# Patient Record
Sex: FEMALE | Race: OTHER | HISPANIC OR LATINO | URBAN - METROPOLITAN AREA
[De-identification: names, ages, dates, MRNs, and addresses within clinical notes are randomized per-mention and may not be internally consistent; named-entity substitution may affect disease eponyms.]

---

## 2017-11-27 ENCOUNTER — EMERGENCY (EMERGENCY)
Facility: HOSPITAL | Age: 27
LOS: 1 days | Discharge: HOME | End: 2017-11-27
Admitting: EMERGENCY MEDICINE
Payer: COMMERCIAL

## 2017-11-27 VITALS
RESPIRATION RATE: 16 BRPM | SYSTOLIC BLOOD PRESSURE: 133 MMHG | TEMPERATURE: 99 F | OXYGEN SATURATION: 100 % | HEART RATE: 94 BPM | DIASTOLIC BLOOD PRESSURE: 79 MMHG

## 2017-11-27 LAB
ALBUMIN SERPL ELPH-MCNC: 3.9 G/DL — SIGNIFICANT CHANGE UP (ref 3.3–5)
ALP SERPL-CCNC: 73 U/L — SIGNIFICANT CHANGE UP (ref 40–120)
ALT FLD-CCNC: 41 U/L — HIGH (ref 4–33)
AMYLASE P1 CFR SERPL: 31 U/L — SIGNIFICANT CHANGE UP (ref 25–125)
APPEARANCE UR: SIGNIFICANT CHANGE UP
AST SERPL-CCNC: 22 U/L — SIGNIFICANT CHANGE UP (ref 4–32)
BASOPHILS # BLD AUTO: 0.01 K/UL — SIGNIFICANT CHANGE UP (ref 0–0.2)
BASOPHILS NFR BLD AUTO: 0.1 % — SIGNIFICANT CHANGE UP (ref 0–2)
BILIRUB SERPL-MCNC: 0.2 MG/DL — SIGNIFICANT CHANGE UP (ref 0.2–1.2)
BILIRUB UR-MCNC: NEGATIVE — SIGNIFICANT CHANGE UP
BLOOD UR QL VISUAL: NEGATIVE — SIGNIFICANT CHANGE UP
BUN SERPL-MCNC: 11 MG/DL — SIGNIFICANT CHANGE UP (ref 7–23)
CALCIUM SERPL-MCNC: 8.5 MG/DL — SIGNIFICANT CHANGE UP (ref 8.4–10.5)
CHLORIDE SERPL-SCNC: 104 MMOL/L — SIGNIFICANT CHANGE UP (ref 98–107)
CO2 SERPL-SCNC: 29 MMOL/L — SIGNIFICANT CHANGE UP (ref 22–31)
COLOR SPEC: YELLOW — SIGNIFICANT CHANGE UP
CREAT SERPL-MCNC: 0.61 MG/DL — SIGNIFICANT CHANGE UP (ref 0.5–1.3)
EOSINOPHIL # BLD AUTO: 0.06 K/UL — SIGNIFICANT CHANGE UP (ref 0–0.5)
EOSINOPHIL NFR BLD AUTO: 0.9 % — SIGNIFICANT CHANGE UP (ref 0–6)
GLUCOSE SERPL-MCNC: 96 MG/DL — SIGNIFICANT CHANGE UP (ref 70–99)
GLUCOSE UR-MCNC: NEGATIVE — SIGNIFICANT CHANGE UP
HCT VFR BLD CALC: 34.8 % — SIGNIFICANT CHANGE UP (ref 34.5–45)
HGB BLD-MCNC: 11.6 G/DL — SIGNIFICANT CHANGE UP (ref 11.5–15.5)
IMM GRANULOCYTES # BLD AUTO: 0.02 # — SIGNIFICANT CHANGE UP
IMM GRANULOCYTES NFR BLD AUTO: 0.3 % — SIGNIFICANT CHANGE UP (ref 0–1.5)
KETONES UR-MCNC: NEGATIVE — SIGNIFICANT CHANGE UP
LEUKOCYTE ESTERASE UR-ACNC: HIGH
LIDOCAIN IGE QN: 37.9 U/L — SIGNIFICANT CHANGE UP (ref 7–60)
LYMPHOCYTES # BLD AUTO: 2.28 K/UL — SIGNIFICANT CHANGE UP (ref 1–3.3)
LYMPHOCYTES # BLD AUTO: 33.7 % — SIGNIFICANT CHANGE UP (ref 13–44)
MCHC RBC-ENTMCNC: 28 PG — SIGNIFICANT CHANGE UP (ref 27–34)
MCHC RBC-ENTMCNC: 33.3 % — SIGNIFICANT CHANGE UP (ref 32–36)
MCV RBC AUTO: 83.9 FL — SIGNIFICANT CHANGE UP (ref 80–100)
MONOCYTES # BLD AUTO: 0.44 K/UL — SIGNIFICANT CHANGE UP (ref 0–0.9)
MONOCYTES NFR BLD AUTO: 6.5 % — SIGNIFICANT CHANGE UP (ref 2–14)
MUCOUS THREADS # UR AUTO: SIGNIFICANT CHANGE UP
NEUTROPHILS # BLD AUTO: 3.96 K/UL — SIGNIFICANT CHANGE UP (ref 1.8–7.4)
NEUTROPHILS NFR BLD AUTO: 58.5 % — SIGNIFICANT CHANGE UP (ref 43–77)
NITRITE UR-MCNC: NEGATIVE — SIGNIFICANT CHANGE UP
NRBC # FLD: 0 — SIGNIFICANT CHANGE UP
PH UR: 6.5 — SIGNIFICANT CHANGE UP (ref 4.6–8)
PLATELET # BLD AUTO: 180 K/UL — SIGNIFICANT CHANGE UP (ref 150–400)
PMV BLD: 9.8 FL — SIGNIFICANT CHANGE UP (ref 7–13)
POTASSIUM SERPL-MCNC: 4.1 MMOL/L — SIGNIFICANT CHANGE UP (ref 3.5–5.3)
POTASSIUM SERPL-SCNC: 4.1 MMOL/L — SIGNIFICANT CHANGE UP (ref 3.5–5.3)
PROT SERPL-MCNC: 7.4 G/DL — SIGNIFICANT CHANGE UP (ref 6–8.3)
PROT UR-MCNC: 20 — SIGNIFICANT CHANGE UP
RBC # BLD: 4.15 M/UL — SIGNIFICANT CHANGE UP (ref 3.8–5.2)
RBC # FLD: 13.2 % — SIGNIFICANT CHANGE UP (ref 10.3–14.5)
RBC CASTS # UR COMP ASSIST: SIGNIFICANT CHANGE UP (ref 0–?)
SODIUM SERPL-SCNC: 142 MMOL/L — SIGNIFICANT CHANGE UP (ref 135–145)
SP GR SPEC: 1.03 — SIGNIFICANT CHANGE UP (ref 1–1.03)
SQUAMOUS # UR AUTO: SIGNIFICANT CHANGE UP
UROBILINOGEN FLD QL: NORMAL E.U. — SIGNIFICANT CHANGE UP (ref 0.1–0.2)
WBC # BLD: 6.77 K/UL — SIGNIFICANT CHANGE UP (ref 3.8–10.5)
WBC # FLD AUTO: 6.77 K/UL — SIGNIFICANT CHANGE UP (ref 3.8–10.5)
WBC UR QL: SIGNIFICANT CHANGE UP (ref 0–?)

## 2017-11-27 PROCEDURE — 76830 TRANSVAGINAL US NON-OB: CPT | Mod: 26

## 2017-11-27 PROCEDURE — 99284 EMERGENCY DEPT VISIT MOD MDM: CPT

## 2017-11-27 RX ORDER — ACETAMINOPHEN 500 MG
650 TABLET ORAL ONCE
Qty: 0 | Refills: 0 | Status: COMPLETED | OUTPATIENT
Start: 2017-11-27 | End: 2017-11-27

## 2017-11-27 RX ORDER — KETOROLAC TROMETHAMINE 30 MG/ML
30 SYRINGE (ML) INJECTION ONCE
Qty: 0 | Refills: 0 | Status: DISCONTINUED | OUTPATIENT
Start: 2017-11-27 | End: 2017-11-27

## 2017-11-27 RX ADMIN — Medication 650 MILLIGRAM(S): at 23:56

## 2017-11-27 RX ADMIN — Medication 30 MILLIGRAM(S): at 23:56

## 2017-11-27 NOTE — ED ADULT NURSE NOTE - OBJECTIVE STATEMENT
pt AO x3, ambulatory, c/o LLQ Abd Pain over area of her  scar with nausea. Pain has been there x2 weeks, however worse in the last few days. Denies chest pain, dizziness, SOB and actual vomiting at this time. Evaluated by provider. Blood and urine obtained and sent to LAB. IV inserted. Right arm 20G. Will continue to monitor.

## 2017-11-27 NOTE — ED PROVIDER NOTE - OBJECTIVE STATEMENT
28 y/o female c/o llq pain that started on 11/10/2017 when she got her period and she attributed the pain to her period. The pain has remained and gotten worse over the past 2 weeks and is associated with nausea but no vomiting. Denies trauma, fever, dysuria, hematuria, vag d/c.

## 2017-11-27 NOTE — ED PROVIDER NOTE - CARE PLAN
Principal Discharge DX:	Laceration of finger Principal Discharge DX:	Laceration of finger  Instructions for follow-up, activity and diet:	Follow up with OBGYN. Rest, drink plenty of fluids.  Advance activity as tolerated.  Continue all previously prescribed medications as directed. You can use motrin 600mg every 6-8 hours for pain or fever, and/or Tylenol 650 mg every 4 hours for pain/fever. Follow up with your primary care physician in 48-72 hours- bring copies of your results.  Return to the emergency department for chest pain, shortness of breath, dizziness, or worsening, concerning, or persistent symptoms. Principal Discharge DX:	Abdominal pain  Instructions for follow-up, activity and diet:	Follow up with OBGYN. Rest, drink plenty of fluids.  Advance activity as tolerated.  Continue all previously prescribed medications as directed. You can use motrin 600mg every 6-8 hours for pain or fever, and/or Tylenol 650 mg every 4 hours for pain/fever. Follow up with your primary care physician in 48-72 hours- bring copies of your results.  Return to the emergency department for chest pain, shortness of breath, dizziness, or worsening, concerning, or persistent symptoms.

## 2017-11-27 NOTE — ED PROVIDER NOTE - PLAN OF CARE
Follow up with OBGYN. Rest, drink plenty of fluids.  Advance activity as tolerated.  Continue all previously prescribed medications as directed. You can use motrin 600mg every 6-8 hours for pain or fever, and/or Tylenol 650 mg every 4 hours for pain/fever. Follow up with your primary care physician in 48-72 hours- bring copies of your results.  Return to the emergency department for chest pain, shortness of breath, dizziness, or worsening, concerning, or persistent symptoms.

## 2017-11-27 NOTE — ED ADULT TRIAGE NOTE - CHIEF COMPLAINT QUOTE
pt c/o LLQ Abd Pain over area of her  scar. pain has been there x2 weeks, however worse in the last few days. pt states "it feels like there is knot there". pt endorsing some nausea, no vomiting. LM- 11/10/17. pt appears comfortable, in NAD.

## 2017-11-27 NOTE — ED PROVIDER NOTE - PROGRESS NOTE DETAILS
CHANTEL Dash: TVUS with simple cyst will dc with nsaids and gyn follow up. pt improved with toradol here.

## 2017-12-23 ENCOUNTER — EMERGENCY (EMERGENCY)
Facility: HOSPITAL | Age: 27
LOS: 1 days | Discharge: HOME | End: 2017-12-23
Attending: EMERGENCY MEDICINE | Admitting: EMERGENCY MEDICINE
Payer: COMMERCIAL

## 2017-12-23 VITALS
SYSTOLIC BLOOD PRESSURE: 128 MMHG | TEMPERATURE: 99 F | RESPIRATION RATE: 18 BRPM | OXYGEN SATURATION: 100 % | DIASTOLIC BLOOD PRESSURE: 72 MMHG | HEART RATE: 78 BPM

## 2017-12-23 VITALS
SYSTOLIC BLOOD PRESSURE: 102 MMHG | TEMPERATURE: 99 F | HEART RATE: 74 BPM | OXYGEN SATURATION: 100 % | RESPIRATION RATE: 16 BRPM | DIASTOLIC BLOOD PRESSURE: 56 MMHG

## 2017-12-23 LAB
ALBUMIN SERPL ELPH-MCNC: 4 G/DL — SIGNIFICANT CHANGE UP (ref 3.3–5)
ALP SERPL-CCNC: 67 U/L — SIGNIFICANT CHANGE UP (ref 40–120)
ALT FLD-CCNC: 76 U/L — HIGH (ref 4–33)
APTT BLD: 28.3 SEC — SIGNIFICANT CHANGE UP (ref 27.5–37.4)
AST SERPL-CCNC: 49 U/L — HIGH (ref 4–32)
BASOPHILS # BLD AUTO: 0.03 K/UL — SIGNIFICANT CHANGE UP (ref 0–0.2)
BASOPHILS NFR BLD AUTO: 0.4 % — SIGNIFICANT CHANGE UP (ref 0–2)
BILIRUB SERPL-MCNC: < 0.2 MG/DL — LOW (ref 0.2–1.2)
BUN SERPL-MCNC: 15 MG/DL — SIGNIFICANT CHANGE UP (ref 7–23)
CALCIUM SERPL-MCNC: 8.9 MG/DL — SIGNIFICANT CHANGE UP (ref 8.4–10.5)
CHLORIDE SERPL-SCNC: 105 MMOL/L — SIGNIFICANT CHANGE UP (ref 98–107)
CO2 SERPL-SCNC: 24 MMOL/L — SIGNIFICANT CHANGE UP (ref 22–31)
CREAT SERPL-MCNC: 0.76 MG/DL — SIGNIFICANT CHANGE UP (ref 0.5–1.3)
EOSINOPHIL # BLD AUTO: 0.04 K/UL — SIGNIFICANT CHANGE UP (ref 0–0.5)
EOSINOPHIL NFR BLD AUTO: 0.5 % — SIGNIFICANT CHANGE UP (ref 0–6)
GLUCOSE SERPL-MCNC: 87 MG/DL — SIGNIFICANT CHANGE UP (ref 70–99)
HCG SERPL-ACNC: < 5 MIU/ML — SIGNIFICANT CHANGE UP
HCT VFR BLD CALC: 39.6 % — SIGNIFICANT CHANGE UP (ref 34.5–45)
HGB BLD-MCNC: 12.8 G/DL — SIGNIFICANT CHANGE UP (ref 11.5–15.5)
IMM GRANULOCYTES # BLD AUTO: 0.04 # — SIGNIFICANT CHANGE UP
IMM GRANULOCYTES NFR BLD AUTO: 0.5 % — SIGNIFICANT CHANGE UP (ref 0–1.5)
INR BLD: 1.02 — SIGNIFICANT CHANGE UP (ref 0.88–1.17)
LYMPHOCYTES # BLD AUTO: 2.92 K/UL — SIGNIFICANT CHANGE UP (ref 1–3.3)
LYMPHOCYTES # BLD AUTO: 37.9 % — SIGNIFICANT CHANGE UP (ref 13–44)
MCHC RBC-ENTMCNC: 27.8 PG — SIGNIFICANT CHANGE UP (ref 27–34)
MCHC RBC-ENTMCNC: 32.3 % — SIGNIFICANT CHANGE UP (ref 32–36)
MCV RBC AUTO: 85.9 FL — SIGNIFICANT CHANGE UP (ref 80–100)
MONOCYTES # BLD AUTO: 0.58 K/UL — SIGNIFICANT CHANGE UP (ref 0–0.9)
MONOCYTES NFR BLD AUTO: 7.5 % — SIGNIFICANT CHANGE UP (ref 2–14)
NEUTROPHILS # BLD AUTO: 4.1 K/UL — SIGNIFICANT CHANGE UP (ref 1.8–7.4)
NEUTROPHILS NFR BLD AUTO: 53.2 % — SIGNIFICANT CHANGE UP (ref 43–77)
NRBC # FLD: 0 — SIGNIFICANT CHANGE UP
PLATELET # BLD AUTO: 211 K/UL — SIGNIFICANT CHANGE UP (ref 150–400)
PMV BLD: 9.6 FL — SIGNIFICANT CHANGE UP (ref 7–13)
POTASSIUM SERPL-MCNC: 4.5 MMOL/L — SIGNIFICANT CHANGE UP (ref 3.5–5.3)
POTASSIUM SERPL-SCNC: 4.5 MMOL/L — SIGNIFICANT CHANGE UP (ref 3.5–5.3)
PROT SERPL-MCNC: 7.5 G/DL — SIGNIFICANT CHANGE UP (ref 6–8.3)
PROTHROM AB SERPL-ACNC: 11.7 SEC — SIGNIFICANT CHANGE UP (ref 9.8–13.1)
RBC # BLD: 4.61 M/UL — SIGNIFICANT CHANGE UP (ref 3.8–5.2)
RBC # FLD: 13.2 % — SIGNIFICANT CHANGE UP (ref 10.3–14.5)
SODIUM SERPL-SCNC: 142 MMOL/L — SIGNIFICANT CHANGE UP (ref 135–145)
WBC # BLD: 7.71 K/UL — SIGNIFICANT CHANGE UP (ref 3.8–10.5)
WBC # FLD AUTO: 7.71 K/UL — SIGNIFICANT CHANGE UP (ref 3.8–10.5)

## 2017-12-23 PROCEDURE — 99284 EMERGENCY DEPT VISIT MOD MDM: CPT

## 2017-12-23 PROCEDURE — 70450 CT HEAD/BRAIN W/O DYE: CPT | Mod: 26

## 2017-12-23 RX ORDER — METOCLOPRAMIDE HCL 10 MG
10 TABLET ORAL ONCE
Qty: 0 | Refills: 0 | Status: COMPLETED | OUTPATIENT
Start: 2017-12-23 | End: 2017-12-23

## 2017-12-23 RX ORDER — SODIUM CHLORIDE 9 MG/ML
1000 INJECTION INTRAMUSCULAR; INTRAVENOUS; SUBCUTANEOUS ONCE
Qty: 0 | Refills: 0 | Status: COMPLETED | OUTPATIENT
Start: 2017-12-23 | End: 2017-12-23

## 2017-12-23 RX ORDER — DIPHENHYDRAMINE HCL 50 MG
25 CAPSULE ORAL ONCE
Qty: 0 | Refills: 0 | Status: COMPLETED | OUTPATIENT
Start: 2017-12-23 | End: 2017-12-23

## 2017-12-23 RX ADMIN — Medication 25 MILLIGRAM(S): at 19:42

## 2017-12-23 RX ADMIN — SODIUM CHLORIDE 1000 MILLILITER(S): 9 INJECTION INTRAMUSCULAR; INTRAVENOUS; SUBCUTANEOUS at 19:41

## 2017-12-23 RX ADMIN — Medication 10 MILLIGRAM(S): at 19:42

## 2017-12-23 NOTE — ED PROVIDER NOTE - PLAN OF CARE
Take acetaminophen (Tylenol) 975mg (3 regular strength tablets) up to 4 times per day as needed for pain. Never take more than 4000mg of acetaminophen/Tylenol in any 24 hour period. Follow up with any of the provided neurology referrals for further evaluation as needed if your headache persists for >5 days in total. Return here to the emergency department for severe pain, intractable vomiting, any signs of a stroke as we discussed, or any other new acute symptoms of concern.

## 2017-12-23 NOTE — ED PROVIDER NOTE - CARE PLAN
Principal Discharge DX:	Headache Principal Discharge DX:	Headache  Instructions for follow-up, activity and diet:	Take acetaminophen (Tylenol) 975mg (3 regular strength tablets) up to 4 times per day as needed for pain. Never take more than 4000mg of acetaminophen/Tylenol in any 24 hour period. Follow up with any of the provided neurology referrals for further evaluation as needed if your headache persists for >5 days in total. Return here to the emergency department for severe pain, intractable vomiting, any signs of a stroke as we discussed, or any other new acute symptoms of concern.

## 2017-12-23 NOTE — ED ADULT NURSE NOTE - CHIEF COMPLAINT QUOTE
Pt. c/o severe headache x 3 days s/p therapeutic epidural for herniated discs in her back; has been taking prescribed medications for migraines without relief. Denies hx of Migraines or headaches. Denies fevers, chills. vision changes, N/V.  Appears uncomfortable in triage.

## 2017-12-23 NOTE — ED PROVIDER NOTE - ATTENDING CONTRIBUTION TO CARE
I performed a face-to-face evaluation of the patient and performed a history and physical examination. I agree with the history and physical examination.    Abisai: Young woman, had epidural injection recently for traumatic back pain. P/w 3 days HA w/o F, stiff neck, confusion. Worse sitting up; better lying down. Likely post-LP HA. Give IVF and IV caffeine and pain meds. I performed a face-to-face evaluation of the patient and performed a history and physical examination. I agree with the history and physical examination.    Abisai: Young woman, had epidural injection recently for traumatic back pain. P/w 3 days HA w/o F, stiff neck, confusion. Worse sitting up; better lying down. Likely post-epidural HA. Give IVF and IV caffeine and pain meds.

## 2017-12-23 NOTE — ED ADULT TRIAGE NOTE - CHIEF COMPLAINT QUOTE
Pt. c/o severe headache x 3 days s/p therapeutic epidural for herniated discs in her back; has been taking prescribed medications for migraines without relief. Denies fevers, chills. vision changes, N/V.  Appears uncomfortable in triage. Pt. c/o severe headache x 3 days s/p therapeutic epidural for herniated discs in her back; has been taking prescribed medications for migraines without relief. Denies hx of Migraines or headaches. Denies fevers, chills. vision changes, N/V.  Appears uncomfortable in triage.

## 2017-12-23 NOTE — ED PROVIDER NOTE - PROGRESS NOTE DETAILS
Pt reports headache has improved w/ reglan and benadryl, CT head not yet performed. PHILIPPE Marte, NINO PGY1

## 2017-12-23 NOTE — ED PROVIDER NOTE - NS ED ROS FT
General: No fevers / chills  HENT: No head trauma, ear pain, runny nose, or sore throat  Eyes: + Photophobia  CP: No chest pain, palpitations, or light headedness  Resp: No shortness of breath, no cough  GI: No abdominal pain, diarrhea, constipation, nausea, or vomiting  : No urinary fz, dysuria, or hematuria  Neuro: + right arm intermittent numbness, no tingling no weakness, no phonophobia  Endo: No hx of diabetes  Heme: No hx of easy bleeding or bruising

## 2017-12-23 NOTE — ED PROVIDER NOTE - MEDICAL DECISION MAKING DETAILS
Abisai: Young woman, had epidural injection recently for traumatic back pain. P/w 3 days HA w/o F, stiff neck, confusion. Worse sitting up; better lying down. Likely post-LP HA. Give IVF and IV caffeine and pain meds. Abisai: Young woman, had epidural injection recently for traumatic back pain. P/w 3 days HA w/o F, stiff neck, confusion. Worse sitting up; better lying down. Likely post-epidural HA. Give IVF and IV caffeine and pain meds. NINO Lee PGY1: Pt with headache after epidural 3 days ago, consistent with epidural headache. Other possible etiologies include other primary headaches. No infectious symptoms at this time and neck is supple without any rigidity or loss of ROM. Will order basic labs and NCHCT, ivf, reglan, benadryl for pain. Reassess, f/u studies, dispo.     Abisai: Young woman, had epidural injection recently for traumatic back pain. P/w 3 days HA w/o F, stiff neck, confusion. Worse sitting up; better lying down. Likely post-epidural HA. Give IVF and IV caffeine and pain meds.

## 2017-12-23 NOTE — ED ADULT NURSE NOTE - OBJECTIVE STATEMENT
Pt a&ox3, c/o headache x3 days s/p epidural injection. Pt breathing even and unlabored, denies sob. Pt denies cp/discomfort, denies headache/dizziness while laying down. Abdomen soft, non-tender, non-distended. Skin is cool dry and intact. IVL placed, labs sent, medicated as ordered with reglan and benadryl IVP, 1L infusing. Will continue to monitor.
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
## 2017-12-23 NOTE — ED PROVIDER NOTE - PHYSICAL EXAMINATION
Gen: NAD, non-toxic, conversational  Eyes: PERRLA, EOMI   HENT: Normocephalic, atraumatic. External ears normal, no rhinorrhea, moist mucous membranes.   CV: RRR, no M/R/G  Resp: CTAB, non-labored, speaking without difficulty on room air  Abd: soft, non tender, non rigid, no guarding or rebound tenderness  Skin: dry, wwp   Neuro: AOx3, speech is fluent and appropriate, CN 2-12 intact, motor 5/5 & symmetric in BUE/BLE, sensation grossly intact with exception of mild decreased sensation right arm, FTN nl.   Psych: Mood "bad", affect euthymic

## 2018-02-07 ENCOUNTER — EMERGENCY (EMERGENCY)
Facility: HOSPITAL | Age: 28
LOS: 1 days | Discharge: HOME | End: 2018-02-07
Attending: EMERGENCY MEDICINE | Admitting: EMERGENCY MEDICINE
Payer: MEDICAID

## 2018-02-07 VITALS
TEMPERATURE: 98 F | HEART RATE: 92 BPM | OXYGEN SATURATION: 100 % | DIASTOLIC BLOOD PRESSURE: 69 MMHG | RESPIRATION RATE: 18 BRPM | SYSTOLIC BLOOD PRESSURE: 142 MMHG

## 2018-02-07 PROCEDURE — 99284 EMERGENCY DEPT VISIT MOD MDM: CPT | Mod: 25

## 2018-02-07 NOTE — ED ADULT TRIAGE NOTE - CHIEF COMPLAINT QUOTE
pt arrives w/ c/o abdominal cramping and vaginal spotting. pt states pregnant not sure how far along. pt states lmp 12/12/17. pt also c/o nausea and vomiting. pt no pmh.

## 2018-02-08 VITALS
OXYGEN SATURATION: 100 % | SYSTOLIC BLOOD PRESSURE: 124 MMHG | DIASTOLIC BLOOD PRESSURE: 64 MMHG | RESPIRATION RATE: 16 BRPM | HEART RATE: 67 BPM

## 2018-02-08 LAB
ALBUMIN SERPL ELPH-MCNC: 4 G/DL — SIGNIFICANT CHANGE UP (ref 3.3–5)
ALP SERPL-CCNC: 57 U/L — SIGNIFICANT CHANGE UP (ref 40–120)
ALT FLD-CCNC: 30 U/L — SIGNIFICANT CHANGE UP (ref 4–33)
APPEARANCE UR: SIGNIFICANT CHANGE UP
AST SERPL-CCNC: 23 U/L — SIGNIFICANT CHANGE UP (ref 4–32)
BASOPHILS # BLD AUTO: 0.02 K/UL — SIGNIFICANT CHANGE UP (ref 0–0.2)
BASOPHILS NFR BLD AUTO: 0.2 % — SIGNIFICANT CHANGE UP (ref 0–2)
BILIRUB SERPL-MCNC: 0.2 MG/DL — SIGNIFICANT CHANGE UP (ref 0.2–1.2)
BILIRUB UR-MCNC: NEGATIVE — SIGNIFICANT CHANGE UP
BLD GP AB SCN SERPL QL: NEGATIVE — SIGNIFICANT CHANGE UP
BLOOD UR QL VISUAL: NEGATIVE — SIGNIFICANT CHANGE UP
BUN SERPL-MCNC: 8 MG/DL — SIGNIFICANT CHANGE UP (ref 7–23)
CALCIUM SERPL-MCNC: 9.2 MG/DL — SIGNIFICANT CHANGE UP (ref 8.4–10.5)
CHLORIDE SERPL-SCNC: 99 MMOL/L — SIGNIFICANT CHANGE UP (ref 98–107)
CO2 SERPL-SCNC: 20 MMOL/L — LOW (ref 22–31)
COLOR SPEC: SIGNIFICANT CHANGE UP
CREAT SERPL-MCNC: 0.56 MG/DL — SIGNIFICANT CHANGE UP (ref 0.5–1.3)
EOSINOPHIL # BLD AUTO: 0.07 K/UL — SIGNIFICANT CHANGE UP (ref 0–0.5)
EOSINOPHIL NFR BLD AUTO: 0.8 % — SIGNIFICANT CHANGE UP (ref 0–6)
GLUCOSE SERPL-MCNC: 93 MG/DL — SIGNIFICANT CHANGE UP (ref 70–99)
GLUCOSE UR-MCNC: NEGATIVE — SIGNIFICANT CHANGE UP
HCG SERPL-ACNC: SIGNIFICANT CHANGE UP MIU/ML
HCT VFR BLD CALC: 36.9 % — SIGNIFICANT CHANGE UP (ref 34.5–45)
HGB BLD-MCNC: 12.7 G/DL — SIGNIFICANT CHANGE UP (ref 11.5–15.5)
HYALINE CASTS # UR AUTO: SIGNIFICANT CHANGE UP (ref 0–?)
IMM GRANULOCYTES # BLD AUTO: 0.03 # — SIGNIFICANT CHANGE UP
IMM GRANULOCYTES NFR BLD AUTO: 0.4 % — SIGNIFICANT CHANGE UP (ref 0–1.5)
KETONES UR-MCNC: NEGATIVE — SIGNIFICANT CHANGE UP
LEUKOCYTE ESTERASE UR-ACNC: HIGH
LYMPHOCYTES # BLD AUTO: 2.43 K/UL — SIGNIFICANT CHANGE UP (ref 1–3.3)
LYMPHOCYTES # BLD AUTO: 29.1 % — SIGNIFICANT CHANGE UP (ref 13–44)
MCHC RBC-ENTMCNC: 29.4 PG — SIGNIFICANT CHANGE UP (ref 27–34)
MCHC RBC-ENTMCNC: 34.4 % — SIGNIFICANT CHANGE UP (ref 32–36)
MCV RBC AUTO: 85.4 FL — SIGNIFICANT CHANGE UP (ref 80–100)
MONOCYTES # BLD AUTO: 0.43 K/UL — SIGNIFICANT CHANGE UP (ref 0–0.9)
MONOCYTES NFR BLD AUTO: 5.1 % — SIGNIFICANT CHANGE UP (ref 2–14)
MUCOUS THREADS # UR AUTO: SIGNIFICANT CHANGE UP
NEUTROPHILS # BLD AUTO: 5.37 K/UL — SIGNIFICANT CHANGE UP (ref 1.8–7.4)
NEUTROPHILS NFR BLD AUTO: 64.4 % — SIGNIFICANT CHANGE UP (ref 43–77)
NITRITE UR-MCNC: NEGATIVE — SIGNIFICANT CHANGE UP
NRBC # FLD: 0 — SIGNIFICANT CHANGE UP
PH UR: 6.5 — SIGNIFICANT CHANGE UP (ref 4.6–8)
PLATELET # BLD AUTO: 177 K/UL — SIGNIFICANT CHANGE UP (ref 150–400)
PMV BLD: 9.9 FL — SIGNIFICANT CHANGE UP (ref 7–13)
POTASSIUM SERPL-MCNC: 4.5 MMOL/L — SIGNIFICANT CHANGE UP (ref 3.5–5.3)
POTASSIUM SERPL-SCNC: 4.5 MMOL/L — SIGNIFICANT CHANGE UP (ref 3.5–5.3)
PROT SERPL-MCNC: 7.2 G/DL — SIGNIFICANT CHANGE UP (ref 6–8.3)
PROT UR-MCNC: NEGATIVE MG/DL — SIGNIFICANT CHANGE UP
RBC # BLD: 4.32 M/UL — SIGNIFICANT CHANGE UP (ref 3.8–5.2)
RBC # FLD: 13.7 % — SIGNIFICANT CHANGE UP (ref 10.3–14.5)
RBC CASTS # UR COMP ASSIST: SIGNIFICANT CHANGE UP (ref 0–?)
RH IG SCN BLD-IMP: POSITIVE — SIGNIFICANT CHANGE UP
SODIUM SERPL-SCNC: 136 MMOL/L — SIGNIFICANT CHANGE UP (ref 135–145)
SP GR SPEC: 1.01 — SIGNIFICANT CHANGE UP (ref 1–1.04)
SQUAMOUS # UR AUTO: SIGNIFICANT CHANGE UP
UROBILINOGEN FLD QL: NORMAL MG/DL — SIGNIFICANT CHANGE UP
WBC # BLD: 8.35 K/UL — SIGNIFICANT CHANGE UP (ref 3.8–10.5)
WBC # FLD AUTO: 8.35 K/UL — SIGNIFICANT CHANGE UP (ref 3.8–10.5)
WBC UR QL: SIGNIFICANT CHANGE UP (ref 0–?)

## 2018-02-08 PROCEDURE — 76830 TRANSVAGINAL US NON-OB: CPT | Mod: 26

## 2018-02-08 RX ORDER — NITROFURANTOIN MACROCRYSTAL 50 MG
100 CAPSULE ORAL ONCE
Qty: 0 | Refills: 0 | Status: COMPLETED | OUTPATIENT
Start: 2018-02-08 | End: 2018-02-08

## 2018-02-08 RX ORDER — ACETAMINOPHEN 500 MG
650 TABLET ORAL ONCE
Qty: 0 | Refills: 0 | Status: COMPLETED | OUTPATIENT
Start: 2018-02-08 | End: 2018-02-08

## 2018-02-08 RX ORDER — NITROFURANTOIN MACROCRYSTAL 50 MG
1 CAPSULE ORAL
Qty: 10 | Refills: 0
Start: 2018-02-08 | End: 2018-02-12

## 2018-02-08 RX ADMIN — Medication 100 MILLIGRAM(S): at 03:28

## 2018-02-08 RX ADMIN — Medication 650 MILLIGRAM(S): at 03:28

## 2018-02-08 RX ADMIN — Medication 650 MILLIGRAM(S): at 02:40

## 2018-02-08 NOTE — ED PROVIDER NOTE - ATTENDING CONTRIBUTION TO CARE
27F  LMP 17, +preg test 2w ago, p/w minimal vaginal spotting after wiping herself today, no other vaginal bleeding. Also w/ intermittent b/l lower abd cramping x 3-4d, worse today. Occasional nausea. Vitals wnl, exam as above. Benign abd.  ddx: eval for possible ectopic. threatened ab.  CBC, cmp, hcg. T and S. TVUS. Symptom control, reassess.

## 2018-02-08 NOTE — ED PROVIDER NOTE - GENITOURINARY, MLM
No discharge, lesions. Bimanual exam done-no bloody discharge, no adnexal tenderness . No discharge, lesions. Bimanual exam done-no bloody discharge, no adnexal tenderness, closed os . (CONNER pressley)

## 2018-02-08 NOTE — ED PROVIDER NOTE - OBJECTIVE STATEMENT
27yof approximately 2 months pregnant (last LMP 12/12/17) presents with abdominal cramping x 1 day and 1 episode of vaginal spotting. Few hours prior to arrival, patient states she saw blood on toilet 27yof approximately 2 months pregnant (last LMP 17) presents with abdominal cramping x 1 day and 1 episode of vaginal spotting. Few hours prior to arrival, patient states she saw blood on toilet  K[place]pfish: 27F  LMP 17, +preg test 2w ago, p/w minimal vaginal spotting after wiping herself today, no other vaginal bleeding. Also w/ intermittent b/l lower abd cramping x 3-4d, worse today. Occasional nausea, no vomit. Denies SOB/CP, black/bloody stool, urinary complaints, lightheaded, back pain, LE pain/swelling. No on any AC.   Has 1st OB appt w/ Dr Thomas in 1w. 27yof approximately 2 months pregnant (last LMP 17) presents with lower abdominal cramping x 1 day and 1 episode of vaginal spotting. Few hours prior to arrival, patient states she saw blood on toilet paper-about 1 teaspoon. No other episodes. Also with nausea.   [place]fish: 27F  LMP 17, +preg test 2w ago, p/w minimal vaginal spotting after wiping herself today, no other vaginal bleeding. Also w/ intermittent b/l lower abd cramping x 3-4d, worse today. Occasional nausea, no vomit. Denies SOB/CP, black/bloody stool, urinary complaints, lightheaded, back pain, LE pain/swelling. No on any AC.   Has 1st OB appt w/ Dr Thomas in 1w.

## 2018-02-08 NOTE — ED PROVIDER NOTE - PROGRESS NOTE DETAILS
Klepfish: Pain improved. +IUP. Rh+. UA positive. will tx for asymptomatic uti in pregnancy. Given copy of results, comforable for dc, outpt pmd/ob f/u.

## 2018-02-08 NOTE — ED PROVIDER NOTE - CARE PLAN
Principal Discharge DX:	Threatened  Principal Discharge DX:	Threatened   Secondary Diagnosis:	UTI (urinary tract infection) during pregnancy, first trimester

## 2018-02-08 NOTE — ED ADULT NURSE NOTE - OBJECTIVE STATEMENT
pt is a&ox3, respirations equal and unlabored, in NAD. pt states lmp 12/12/17. 1 day of pelvic pain and vaginal spotting. States has been nausea, vomiting, diarrhea throughout pregnancy, only nausea at this time. No dizziness. Pt pending US. Per MD nikos saldivar to [facility] for lab, hold type and screen. Safety and comfort maintained. pt appears comfortable

## 2018-02-09 LAB
BACTERIA UR CULT: SIGNIFICANT CHANGE UP
SPECIMEN SOURCE: SIGNIFICANT CHANGE UP

## 2018-12-11 ENCOUNTER — EMERGENCY (EMERGENCY)
Facility: HOSPITAL | Age: 28
LOS: 1 days | Discharge: HOME | End: 2018-12-11
Attending: EMERGENCY MEDICINE | Admitting: EMERGENCY MEDICINE
Payer: MEDICAID

## 2018-12-11 VITALS
RESPIRATION RATE: 16 BRPM | TEMPERATURE: 98 F | SYSTOLIC BLOOD PRESSURE: 131 MMHG | HEART RATE: 67 BPM | DIASTOLIC BLOOD PRESSURE: 75 MMHG | OXYGEN SATURATION: 100 %

## 2018-12-11 LAB
ALBUMIN SERPL ELPH-MCNC: 4.3 G/DL — SIGNIFICANT CHANGE UP (ref 3.3–5)
ALP SERPL-CCNC: 66 U/L — SIGNIFICANT CHANGE UP (ref 40–120)
ALT FLD-CCNC: 49 U/L — HIGH (ref 4–33)
APPEARANCE UR: CLEAR — SIGNIFICANT CHANGE UP
AST SERPL-CCNC: 26 U/L — SIGNIFICANT CHANGE UP (ref 4–32)
BASOPHILS # BLD AUTO: 0.02 K/UL — SIGNIFICANT CHANGE UP (ref 0–0.2)
BASOPHILS NFR BLD AUTO: 0.4 % — SIGNIFICANT CHANGE UP (ref 0–2)
BILIRUB SERPL-MCNC: 0.4 MG/DL — SIGNIFICANT CHANGE UP (ref 0.2–1.2)
BILIRUB UR-MCNC: NEGATIVE — SIGNIFICANT CHANGE UP
BLOOD UR QL VISUAL: NEGATIVE — SIGNIFICANT CHANGE UP
BUN SERPL-MCNC: 6 MG/DL — LOW (ref 7–23)
CALCIUM SERPL-MCNC: 9.3 MG/DL — SIGNIFICANT CHANGE UP (ref 8.4–10.5)
CHLORIDE SERPL-SCNC: 101 MMOL/L — SIGNIFICANT CHANGE UP (ref 98–107)
CO2 SERPL-SCNC: 25 MMOL/L — SIGNIFICANT CHANGE UP (ref 22–31)
COLOR SPEC: SIGNIFICANT CHANGE UP
CREAT SERPL-MCNC: 0.65 MG/DL — SIGNIFICANT CHANGE UP (ref 0.5–1.3)
EOSINOPHIL # BLD AUTO: 0.03 K/UL — SIGNIFICANT CHANGE UP (ref 0–0.5)
EOSINOPHIL NFR BLD AUTO: 0.6 % — SIGNIFICANT CHANGE UP (ref 0–6)
GLUCOSE SERPL-MCNC: 87 MG/DL — SIGNIFICANT CHANGE UP (ref 70–99)
GLUCOSE UR-MCNC: NEGATIVE — SIGNIFICANT CHANGE UP
HCG SERPL-ACNC: < 5 MIU/ML — SIGNIFICANT CHANGE UP
HCT VFR BLD CALC: 35.9 % — SIGNIFICANT CHANGE UP (ref 34.5–45)
HGB BLD-MCNC: 12.2 G/DL — SIGNIFICANT CHANGE UP (ref 11.5–15.5)
IMM GRANULOCYTES # BLD AUTO: 0.01 # — SIGNIFICANT CHANGE UP
IMM GRANULOCYTES NFR BLD AUTO: 0.2 % — SIGNIFICANT CHANGE UP (ref 0–1.5)
KETONES UR-MCNC: SIGNIFICANT CHANGE UP
LEUKOCYTE ESTERASE UR-ACNC: NEGATIVE — SIGNIFICANT CHANGE UP
LIDOCAIN IGE QN: 32.2 U/L — SIGNIFICANT CHANGE UP (ref 7–60)
LYMPHOCYTES # BLD AUTO: 1.77 K/UL — SIGNIFICANT CHANGE UP (ref 1–3.3)
LYMPHOCYTES # BLD AUTO: 33 % — SIGNIFICANT CHANGE UP (ref 13–44)
MCHC RBC-ENTMCNC: 28.6 PG — SIGNIFICANT CHANGE UP (ref 27–34)
MCHC RBC-ENTMCNC: 34 % — SIGNIFICANT CHANGE UP (ref 32–36)
MCV RBC AUTO: 84.3 FL — SIGNIFICANT CHANGE UP (ref 80–100)
MONOCYTES # BLD AUTO: 0.25 K/UL — SIGNIFICANT CHANGE UP (ref 0–0.9)
MONOCYTES NFR BLD AUTO: 4.7 % — SIGNIFICANT CHANGE UP (ref 2–14)
NEUTROPHILS # BLD AUTO: 3.29 K/UL — SIGNIFICANT CHANGE UP (ref 1.8–7.4)
NEUTROPHILS NFR BLD AUTO: 61.1 % — SIGNIFICANT CHANGE UP (ref 43–77)
NITRITE UR-MCNC: NEGATIVE — SIGNIFICANT CHANGE UP
NRBC # FLD: 0 — SIGNIFICANT CHANGE UP
PH UR: 6.5 — SIGNIFICANT CHANGE UP (ref 5–8)
PLATELET # BLD AUTO: 182 K/UL — SIGNIFICANT CHANGE UP (ref 150–400)
PMV BLD: 10.5 FL — SIGNIFICANT CHANGE UP (ref 7–13)
POTASSIUM SERPL-MCNC: 3.9 MMOL/L — SIGNIFICANT CHANGE UP (ref 3.5–5.3)
POTASSIUM SERPL-SCNC: 3.9 MMOL/L — SIGNIFICANT CHANGE UP (ref 3.5–5.3)
PROT SERPL-MCNC: 7.4 G/DL — SIGNIFICANT CHANGE UP (ref 6–8.3)
PROT UR-MCNC: NEGATIVE — SIGNIFICANT CHANGE UP
RBC # BLD: 4.26 M/UL — SIGNIFICANT CHANGE UP (ref 3.8–5.2)
RBC # FLD: 14.1 % — SIGNIFICANT CHANGE UP (ref 10.3–14.5)
SODIUM SERPL-SCNC: 140 MMOL/L — SIGNIFICANT CHANGE UP (ref 135–145)
SP GR SPEC: 1.01 — SIGNIFICANT CHANGE UP (ref 1–1.04)
UROBILINOGEN FLD QL: NORMAL — SIGNIFICANT CHANGE UP
WBC # BLD: 5.37 K/UL — SIGNIFICANT CHANGE UP (ref 3.8–10.5)
WBC # FLD AUTO: 5.37 K/UL — SIGNIFICANT CHANGE UP (ref 3.8–10.5)

## 2018-12-11 PROCEDURE — 74177 CT ABD & PELVIS W/CONTRAST: CPT | Mod: 26

## 2018-12-11 PROCEDURE — 93010 ELECTROCARDIOGRAM REPORT: CPT

## 2018-12-11 PROCEDURE — 99284 EMERGENCY DEPT VISIT MOD MDM: CPT | Mod: 25

## 2018-12-11 RX ORDER — SODIUM CHLORIDE 9 MG/ML
1000 INJECTION INTRAMUSCULAR; INTRAVENOUS; SUBCUTANEOUS ONCE
Qty: 0 | Refills: 0 | Status: COMPLETED | OUTPATIENT
Start: 2018-12-11 | End: 2018-12-11

## 2018-12-11 RX ORDER — ACETAMINOPHEN 500 MG
650 TABLET ORAL ONCE
Qty: 0 | Refills: 0 | Status: COMPLETED | OUTPATIENT
Start: 2018-12-11 | End: 2018-12-11

## 2018-12-11 RX ADMIN — SODIUM CHLORIDE 1000 MILLILITER(S): 9 INJECTION INTRAMUSCULAR; INTRAVENOUS; SUBCUTANEOUS at 19:11

## 2018-12-11 RX ADMIN — Medication 650 MILLIGRAM(S): at 19:11

## 2018-12-11 NOTE — ED PROVIDER NOTE - OBJECTIVE STATEMENT
28F no PMH, PSH  p/w several complaints. For 3d has intermittent periumbilical and L sided abd pain, intermittent, worse w/ certain movements and positions, minimal relief w/ occasional tylenol (none taken today), not similar to prior. Went to  who referred to ED for CT. Also c/o slight dysuria x2-3d, no vaginal discharge or bleeding. Also has intermittent diffuse CP, for 1-2 mos, also worse w/ certain positions, none currently. Pt states she followed up with cardiology, had "treadmill" test which was reportedly normal, also echo w/ no results yet. Also b/l pruritic rash to b/l posterior hands x1-mos. Also c/o b/l calf pain, only if she stands for too long, none currently. Denies f/c, SOB, lightheaded, palpitations, NVD, urinary frequency, hematuria, LE swelling, black/bloody stool, recent travel, sick contacts. PT states that UA at the IC was wnl.

## 2018-12-11 NOTE — ED ADULT TRIAGE NOTE - CHIEF COMPLAINT QUOTE
pt comes to ED for CP since october and abdominal pain since yesterday. pt had workups for CP at hospitals and cardiologist. pt appears comfortable NAD pt denies NVD LMP sometime in November.

## 2018-12-11 NOTE — ED PROVIDER NOTE - MEDICAL DECISION MAKING DETAILS
28F no PMH, PSH  p/w intermittent positional abd pain x3d, intermitten cp x1-2 mos w/ recent normal cards w/u, intermittent calf pain only w/ prolonged standing, slightly pruritic rash, minimal dysuria.  Complaints likely not related. CP: EKG wnl. Clinically benign. clinically not ACS, PE, tamponade, dissection, PTX, perf, myocarditis, mediastinitis. Stable for outpt pmd/cards f/u. Rash: possibly eczema/dry skin or dermatitis. Will recommend aveeno skin cream, pmd/derm f/u. clinically not infectious. Calf pain: Likely MSK. Clinically not DVT. NSAIDs prn, outpt pmd f/u. Abd: Possible MSK vs. less likely appy or colitis or UTI. 28F no PMH, PSH  p/w intermittent positional abd pain x3d, intermitten cp x1-2 mos w/ recent normal cards w/u, intermittent calf pain only w/ prolonged standing, slightly pruritic rash, minimal dysuria.  Complaints likely not related. CP: EKG wnl. Clinically benign. clinically not ACS, PE, tamponade, dissection, PTX, perf, myocarditis, mediastinitis. Stable for outpt pmd/cards f/u. Rash: possibly eczema/dry skin or dermatitis. Will recommend aveeno skin cream, pmd/derm f/u. clinically not infectious. Calf pain: Likely MSK. Clinically not DVT. NSAIDs prn, outpt pmd f/u. Abd: Possible MSK vs. less likely appy or colitis or UTI. CBC, cmp, lipase, hcg, UA, CT. IVF, symptom control, reassess.

## 2018-12-11 NOTE — ED PROVIDER NOTE - PHYSICAL EXAMINATION
no LE edema, normal equal distal pulses. no calf ttp, No spinal ttp, neck FROM. Strength 5/5. No bony ttp, FROM all extremities. Normal equal distal pulses. Steady unassisted gait.   minimal hardened skin on posterior aspect of b/l hands. no erythema/warmth. normal cap refill. sensation intact.  abd: BS+, soft, minimal diffuse ttp, slightly more at periumbilical region. no rebound/guarding. no CVAT.

## 2018-12-11 NOTE — ED PROVIDER NOTE - PROGRESS NOTE DETAILS
Klepfish: Abd pain much improved, abd soft ntnd. CT w/ gasllstones, no other pathology. Clinicaly not cholecystitis. Given copy of all results, surg referral list, comfortable for dc, outpt pmd/cards/gi f/u.

## 2018-12-11 NOTE — ED PROVIDER NOTE - NSFOLLOWUPINSTRUCTIONS_ED_ALL_ED_FT
Abdominal Pain    Many things can cause abdominal pain. Many times, abdominal pain is not caused by a disease and will improve without treatment. Your health care provider will do a physical exam to determine if there is a dangerous cause of your pain; blood tests and imaging may help determine the cause of your pain. However, in many cases, no cause may be found and you may need further testing as an outpatient. Monitor your abdominal pain for any changes.     SEEK IMMEDIATE MEDICAL CARE IF YOU HAVE ANY OF THE FOLLOWING SYMPTOMS: worsening abdominal pain, uncontrollable vomiting, profuse diarrhea, inability to have bowel movements or pass gas, black or bloody stools, fever accompanying chest pain or back pain, or fainting. These symptoms may represent a serious problem that is an emergency. Do not wait to see if the symptoms will go away. Get medical help right away. Call 911 and do not drive yourself to the hospital.     Chest Pain    Chest pain can be caused by many different conditions which may or may not be dangerous. Causes include heartburn, lung infections, heart attack, blood clot in lungs, skin infections, strain or damage to muscle, cartilage, or bones, etc. In addition to a history and physical examination, an electrocardiogram (ECG) or other lab tests may have been performed to determine the cause of your chest pain. Follow up with your primary care provider or with a cardiologist as instructed.     SEEK IMMEDIATE MEDICAL CARE IF YOU HAVE ANY OF THE FOLLOWING SYMPTOMS: worsening chest pain, coughing up blood, unexplained back/neck/jaw pain, severe abdominal pain, dizziness or lightheadedness, fainting, shortness of breath, sweaty or clammy skin, vomiting, or racing heart beat. These symptoms may represent a serious problem that is an emergency. Do not wait to see if the symptoms will go away. Get medical help right away. Call 911 and do not drive yourself to the hospital.     Follow up with dermatologist for rash.  Follow up with cardiologist for chest pain.  Follow up with general surgeon for gallstones.  Can take tylenol 650mg every 6hrs as needed for pain.  Return for persistent fever/vomit, uncontrolled pain.

## 2018-12-11 NOTE — ED ADULT NURSE NOTE - NSIMPLEMENTINTERV_GEN_ALL_ED
Implemented All Universal Safety Interventions:  [place] to call system. Call bell, personal items and telephone within reach. Instruct patient to call for assistance. Room bathroom lighting operational. Non-slip footwear when patient is off stretcher. Physically safe environment: no spills, clutter or unnecessary equipment. Stretcher in lowest position, wheels locked, appropriate side rails in place.

## 2018-12-13 LAB
BACTERIA UR CULT: SIGNIFICANT CHANGE UP
SPECIMEN SOURCE: SIGNIFICANT CHANGE UP

## 2018-12-14 NOTE — ED POST DISCHARGE NOTE - DETAILS
Pt called and informed results and to follow up with pmd and have repeat ucx. Pt states she has an appt on 12/17. Pt c/o mild dysuria and instructed if s/s increase to follow up with  sooner.

## 2018-12-17 ENCOUNTER — APPOINTMENT (OUTPATIENT)
Dept: INTERNAL MEDICINE | Facility: CLINIC | Age: 28
End: 2018-12-17
Payer: MEDICAID

## 2018-12-17 VITALS
WEIGHT: 200 LBS | DIASTOLIC BLOOD PRESSURE: 77 MMHG | BODY MASS INDEX: 35.44 KG/M2 | HEART RATE: 63 BPM | TEMPERATURE: 98.9 F | OXYGEN SATURATION: 98 % | HEIGHT: 63 IN | SYSTOLIC BLOOD PRESSURE: 127 MMHG

## 2018-12-17 DIAGNOSIS — N64.9 DISORDER OF BREAST, UNSPECIFIED: ICD-10-CM

## 2018-12-17 DIAGNOSIS — Z78.9 OTHER SPECIFIED HEALTH STATUS: ICD-10-CM

## 2018-12-17 DIAGNOSIS — L73.9 FOLLICULAR DISORDER, UNSPECIFIED: ICD-10-CM

## 2018-12-17 LAB
BILIRUB UR QL STRIP: NEGATIVE
GLUCOSE UR-MCNC: NEGATIVE
HCG UR QL: 0.2 EU/DL
HGB UR QL STRIP.AUTO: NEGATIVE
KETONES UR-MCNC: NEGATIVE
LEUKOCYTE ESTERASE UR QL STRIP: NEGATIVE
NITRITE UR QL STRIP: NEGATIVE
PH UR STRIP: 7.5
PROT UR STRIP-MCNC: NEGATIVE
SP GR UR STRIP: 1.02

## 2018-12-17 PROCEDURE — 99385 PREV VISIT NEW AGE 18-39: CPT | Mod: 25

## 2018-12-17 NOTE — HISTORY OF PRESENT ILLNESS
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

## 2018-12-17 NOTE — REVIEW OF SYSTEMS
[Chest Pain] : chest pain [Muscle Pain] : muscle pain [Fever] : no fever [Night Sweats] : no night sweats [Nasal Discharge] : no nasal discharge [Orthopnea] : no orthopnea [Shortness Of Breath] : no shortness of breath [Wheezing] : no wheezing [Abdominal Pain] : no abdominal pain [Vomiting] : no vomiting [Hematuria] : no hematuria [Frequency] : no frequency

## 2018-12-17 NOTE — PLAN
[FreeTextEntry1] : Establish care\par Post er\par multiple issue\par 1 Atypical chest pain non cardiac\par Gallstones\par gerd\par breast issue 6 week post breast feeding\par small promnent areas under axilla most likely low grade follicultts\par cervical radiculopathy\par pain left side abdomen??\par \par \par f/u gyn\par keflex 500 tid for 10 days and hot soaks under arm\par omeprazole 20mg daily for 4 week\par possible egd and or hjda in future\par neck and arm issue\par Physical therapy prn\par

## 2018-12-17 NOTE — PHYSICAL EXAM
[No Acute Distress] : no acute distress [Well Nourished] : well nourished [Normal Outer Ear/Nose] : the outer ears and nose were normal in appearance [Normal Oropharynx] : the oropharynx was normal [No JVD] : no jugular venous distention [Supple] : supple [No Respiratory Distress] : no respiratory distress  [Clear to Auscultation] : lungs were clear to auscultation bilaterally [Normal Rate] : normal rate  [Regular Rhythm] : with a regular rhythm [No Edema] : there was no peripheral edema [Soft] : abdomen soft [No HSM] : no HSM [No Joint Swelling] : no joint swelling [Grossly Normal Strength/Tone] : grossly normal strength/tone [No Rash] : no rash [No Skin Lesions] : no skin lesions [de-identified] : post lactation breast   some probable folllciulitis axilla

## 2018-12-19 LAB
CHOLEST SERPL-MCNC: 137 MG/DL
CHOLEST/HDLC SERPL: 4.2 RATIO
HBA1C MFR BLD HPLC: 5 %
HBV SURFACE AB SER QL: REACTIVE
HCV AB SER QL: NONREACTIVE
HCV S/CO RATIO: 0.17 S/CO
HDLC SERPL-MCNC: 33 MG/DL
HEPATITIS A IGG ANTIBODY: NONREACTIVE
LDLC SERPL CALC-MCNC: 63 MG/DL
MEV IGG FLD QL IA: >300 AU/ML
MEV IGG+IGM SER-IMP: POSITIVE
MUV AB SER-ACNC: POSITIVE
MUV IGG SER QL IA: 17.9 AU/ML
RUBV IGG FLD-ACNC: 1 INDEX
RUBV IGG SER-IMP: NORMAL
TRIGL SERPL-MCNC: 206 MG/DL
TSH SERPL-ACNC: 1.51 UIU/ML
VZV AB TITR SER: POSITIVE
VZV IGG SER IF-ACNC: 710.9 INDEX

## 2019-01-03 ENCOUNTER — EMERGENCY (EMERGENCY)
Facility: HOSPITAL | Age: 29
LOS: 1 days | Discharge: HOME | End: 2019-01-03
Attending: EMERGENCY MEDICINE | Admitting: EMERGENCY MEDICINE
Payer: MEDICAID

## 2019-01-03 VITALS
SYSTOLIC BLOOD PRESSURE: 127 MMHG | TEMPERATURE: 98 F | OXYGEN SATURATION: 99 % | DIASTOLIC BLOOD PRESSURE: 65 MMHG | HEART RATE: 55 BPM | RESPIRATION RATE: 15 BRPM

## 2019-01-03 VITALS
RESPIRATION RATE: 17 BRPM | OXYGEN SATURATION: 100 % | HEART RATE: 67 BPM | TEMPERATURE: 98 F | DIASTOLIC BLOOD PRESSURE: 70 MMHG | SYSTOLIC BLOOD PRESSURE: 116 MMHG

## 2019-01-03 DIAGNOSIS — Z98.891 HISTORY OF UTERINE SCAR FROM PREVIOUS SURGERY: Chronic | ICD-10-CM

## 2019-01-03 LAB
ALBUMIN SERPL ELPH-MCNC: 5.1 G/DL — HIGH (ref 3.3–5)
ALP SERPL-CCNC: 72 U/L — SIGNIFICANT CHANGE UP (ref 40–120)
ALT FLD-CCNC: 65 U/L — HIGH (ref 4–33)
APPEARANCE UR: CLEAR — SIGNIFICANT CHANGE UP
AST SERPL-CCNC: 52 U/L — HIGH (ref 4–32)
BACTERIA # UR AUTO: NEGATIVE — SIGNIFICANT CHANGE UP
BASE EXCESS BLDV CALC-SCNC: 3 MMOL/L — SIGNIFICANT CHANGE UP
BASOPHILS # BLD AUTO: 0.02 K/UL — SIGNIFICANT CHANGE UP (ref 0–0.2)
BASOPHILS NFR BLD AUTO: 0.4 % — SIGNIFICANT CHANGE UP (ref 0–2)
BILIRUB SERPL-MCNC: 0.4 MG/DL — SIGNIFICANT CHANGE UP (ref 0.2–1.2)
BILIRUB UR-MCNC: NEGATIVE — SIGNIFICANT CHANGE UP
BLOOD GAS VENOUS - CREATININE: 0.59 MG/DL — SIGNIFICANT CHANGE UP (ref 0.5–1.3)
BLOOD UR QL VISUAL: NEGATIVE — SIGNIFICANT CHANGE UP
BUN SERPL-MCNC: 9 MG/DL — SIGNIFICANT CHANGE UP (ref 7–23)
CALCIUM SERPL-MCNC: 9.9 MG/DL — SIGNIFICANT CHANGE UP (ref 8.4–10.5)
CHLORIDE BLDV-SCNC: 105 MMOL/L — SIGNIFICANT CHANGE UP (ref 96–108)
CHLORIDE SERPL-SCNC: 101 MMOL/L — SIGNIFICANT CHANGE UP (ref 98–107)
CO2 SERPL-SCNC: 23 MMOL/L — SIGNIFICANT CHANGE UP (ref 22–31)
COLOR SPEC: YELLOW — SIGNIFICANT CHANGE UP
CREAT SERPL-MCNC: 0.69 MG/DL — SIGNIFICANT CHANGE UP (ref 0.5–1.3)
D DIMER BLD IA.RAPID-MCNC: 158 NG/ML — SIGNIFICANT CHANGE UP
EOSINOPHIL # BLD AUTO: 0.03 K/UL — SIGNIFICANT CHANGE UP (ref 0–0.5)
EOSINOPHIL NFR BLD AUTO: 0.6 % — SIGNIFICANT CHANGE UP (ref 0–6)
GAS PNL BLDV: 134 MMOL/L — LOW (ref 136–146)
GLUCOSE BLDV-MCNC: 85 — SIGNIFICANT CHANGE UP (ref 70–99)
GLUCOSE SERPL-MCNC: 88 MG/DL — SIGNIFICANT CHANGE UP (ref 70–99)
GLUCOSE UR-MCNC: NEGATIVE — SIGNIFICANT CHANGE UP
HCO3 BLDV-SCNC: 25 MMOL/L — SIGNIFICANT CHANGE UP (ref 20–27)
HCT VFR BLD CALC: 41.2 % — SIGNIFICANT CHANGE UP (ref 34.5–45)
HCT VFR BLDV CALC: 43.3 % — SIGNIFICANT CHANGE UP (ref 34.5–45)
HGB BLD-MCNC: 13.9 G/DL — SIGNIFICANT CHANGE UP (ref 11.5–15.5)
HGB BLDV-MCNC: 14.1 G/DL — SIGNIFICANT CHANGE UP (ref 11.5–15.5)
HYALINE CASTS # UR AUTO: NEGATIVE — SIGNIFICANT CHANGE UP
IMM GRANULOCYTES NFR BLD AUTO: 0.2 % — SIGNIFICANT CHANGE UP (ref 0–1.5)
KETONES UR-MCNC: SIGNIFICANT CHANGE UP
LACTATE BLDV-MCNC: 0.9 MMOL/L — SIGNIFICANT CHANGE UP (ref 0.5–2)
LEUKOCYTE ESTERASE UR-ACNC: NEGATIVE — SIGNIFICANT CHANGE UP
LIDOCAIN IGE QN: 45.1 U/L — SIGNIFICANT CHANGE UP (ref 7–60)
LYMPHOCYTES # BLD AUTO: 2.07 K/UL — SIGNIFICANT CHANGE UP (ref 1–3.3)
LYMPHOCYTES # BLD AUTO: 40.5 % — SIGNIFICANT CHANGE UP (ref 13–44)
MCHC RBC-ENTMCNC: 28.9 PG — SIGNIFICANT CHANGE UP (ref 27–34)
MCHC RBC-ENTMCNC: 33.7 % — SIGNIFICANT CHANGE UP (ref 32–36)
MCV RBC AUTO: 85.7 FL — SIGNIFICANT CHANGE UP (ref 80–100)
MONOCYTES # BLD AUTO: 0.22 K/UL — SIGNIFICANT CHANGE UP (ref 0–0.9)
MONOCYTES NFR BLD AUTO: 4.3 % — SIGNIFICANT CHANGE UP (ref 2–14)
NEUTROPHILS # BLD AUTO: 2.76 K/UL — SIGNIFICANT CHANGE UP (ref 1.8–7.4)
NEUTROPHILS NFR BLD AUTO: 54 % — SIGNIFICANT CHANGE UP (ref 43–77)
NITRITE UR-MCNC: NEGATIVE — SIGNIFICANT CHANGE UP
NRBC # FLD: 0 — SIGNIFICANT CHANGE UP
PCO2 BLDV: 52 MMHG — HIGH (ref 41–51)
PH BLDV: 7.35 PH — SIGNIFICANT CHANGE UP (ref 7.32–7.43)
PH UR: 6.5 — SIGNIFICANT CHANGE UP (ref 5–8)
PLATELET # BLD AUTO: 183 K/UL — SIGNIFICANT CHANGE UP (ref 150–400)
PMV BLD: 10.9 FL — SIGNIFICANT CHANGE UP (ref 7–13)
PO2 BLDV: 19 MMHG — LOW (ref 35–40)
POTASSIUM BLDV-SCNC: 6.8 MMOL/L — CRITICAL HIGH (ref 3.4–4.5)
POTASSIUM SERPL-MCNC: 5.4 MMOL/L — HIGH (ref 3.5–5.3)
POTASSIUM SERPL-SCNC: 5.4 MMOL/L — HIGH (ref 3.5–5.3)
PROT SERPL-MCNC: 8.9 G/DL — HIGH (ref 6–8.3)
PROT UR-MCNC: 20 — SIGNIFICANT CHANGE UP
RBC # BLD: 4.81 M/UL — SIGNIFICANT CHANGE UP (ref 3.8–5.2)
RBC # FLD: 13.2 % — SIGNIFICANT CHANGE UP (ref 10.3–14.5)
RBC CASTS # UR COMP ASSIST: SIGNIFICANT CHANGE UP (ref 0–?)
SAO2 % BLDV: 23.2 % — LOW (ref 60–85)
SODIUM SERPL-SCNC: 139 MMOL/L — SIGNIFICANT CHANGE UP (ref 135–145)
SP GR SPEC: 1.02 — SIGNIFICANT CHANGE UP (ref 1–1.04)
SQUAMOUS # UR AUTO: SIGNIFICANT CHANGE UP
TROPONIN T, HIGH SENSITIVITY: < 6 NG/L — SIGNIFICANT CHANGE UP (ref ?–14)
TROPONIN T, HIGH SENSITIVITY: < 6 NG/L — SIGNIFICANT CHANGE UP (ref ?–14)
UROBILINOGEN FLD QL: SIGNIFICANT CHANGE UP
WBC # BLD: 5.11 K/UL — SIGNIFICANT CHANGE UP (ref 3.8–10.5)
WBC # FLD AUTO: 5.11 K/UL — SIGNIFICANT CHANGE UP (ref 3.8–10.5)
WBC UR QL: SIGNIFICANT CHANGE UP (ref 0–?)

## 2019-01-03 PROCEDURE — 71046 X-RAY EXAM CHEST 2 VIEWS: CPT | Mod: 26

## 2019-01-03 PROCEDURE — 99285 EMERGENCY DEPT VISIT HI MDM: CPT

## 2019-01-03 RX ORDER — KETOROLAC TROMETHAMINE 30 MG/ML
30 SYRINGE (ML) INJECTION ONCE
Qty: 0 | Refills: 0 | Status: DISCONTINUED | OUTPATIENT
Start: 2019-01-03 | End: 2019-01-03

## 2019-01-03 RX ORDER — ONDANSETRON 8 MG/1
4 TABLET, FILM COATED ORAL ONCE
Qty: 0 | Refills: 0 | Status: COMPLETED | OUTPATIENT
Start: 2019-01-03 | End: 2019-01-03

## 2019-01-03 RX ORDER — FAMOTIDINE 10 MG/ML
20 INJECTION INTRAVENOUS ONCE
Qty: 0 | Refills: 0 | Status: COMPLETED | OUTPATIENT
Start: 2019-01-03 | End: 2019-01-03

## 2019-01-03 RX ADMIN — ONDANSETRON 4 MILLIGRAM(S): 8 TABLET, FILM COATED ORAL at 20:09

## 2019-01-03 RX ADMIN — FAMOTIDINE 20 MILLIGRAM(S): 10 INJECTION INTRAVENOUS at 20:10

## 2019-01-03 RX ADMIN — Medication 30 MILLILITER(S): at 20:09

## 2019-01-03 NOTE — ED PROVIDER NOTE - NSFOLLOWUPINSTRUCTIONS_ED_ALL_ED_FT
See your primary care provider within 48 hours. Follow up with Gastroenterology this week.  Take Pepcid 20 mg 30 minutes before meals 2x/day.  Take over the counter Maalox as needed. Continue the Omeprazole. Stop eating spicy and acidic foods. Eat smaller meals more frequently.  Worsening pain, new fever, chills, nausea, vomiting, new chest pain/shortness of breath return to ER

## 2019-01-03 NOTE — ED PROVIDER NOTE - MEDICAL DECISION MAKING DETAILS
29 y/o female with no pmhx presents to ED c/o chest pain and epigastric pain. low suspicion for acs. will check labs, dimer, cxr. GI cocktail for abdominal pain, check labs, reassess.

## 2019-01-03 NOTE — ED ADULT NURSE NOTE - OBJECTIVE STATEMENT
AOX4, skin warm, dry and intact.  c/o left sided chest pain that radiates to right side since Oct, with negative cardiac workup.  Abdominal pain.  IV access obtained.  Labs drawn and sent.  Pt medicated. Refused Toradol.

## 2019-01-03 NOTE — ED PROVIDER NOTE - OBJECTIVE STATEMENT
27 y/o female with no pmhx presents to ED c/o chest pain and epigastric pain. Pt has had chronic chest pain x 3-4 months. Had stress/echo with cardiologist that was negative. Pt states she had cp 1 month ago seen at [facility] ER for, pain resolved and came back last 2 days. Intermittent sharp left sided pain, lasts few minutes at a time. Currently has 7/10 pain. Not exertional. Not pleuritic. No recent travel, surgeries, hospitalizations, LE edema, calf pain, hx of dvt/pe, ocps. Pt c/o acute on chronic epigastric pain described as burning/pressure x 2 days. Was started on prilosec by her PCP. Associated with nausea, no vomiting. States it's different last 2 days because she is burping more often. No family hx of MI. No fever, chills, sob, palpitations, cough, vomiting, diarrhea, hematuria, flank pain. Pt also noted dysuria x 2 days.

## 2019-01-03 NOTE — ED ADULT TRIAGE NOTE - CHIEF COMPLAINT QUOTE
Pt c/o "sharp" left-sided chest pain with headache since yesterday at 7pm, worse with movement.  Pt denies PMHx.  Pt took Tylenol this AM for pain.

## 2019-01-03 NOTE — ED PROVIDER NOTE - ATTENDING CONTRIBUTION TO CARE
Patient presents with chest pain. Started yesterday, intermittent, left side, nonradiating, associated with burping, none on my evaluation. No associated fevers, chills, ha, sob, le edema, abd pain, vomiting, diarrhea, dysuria. No recent travel. Has had 2 months of generalized cp which was different inquality prior, saw her pmd and cardiologist who did a stress test and an echo which she was told was normal. Also had b/l duplex and ct chest done which showed no blood clots. No other complaints.  exam  GEN - NAD; well appearing; A+O x3   HEAD - NC/AT   EYES- PERRL, EOMI  ENT: Airway patent, mmm, Oral cavity and pharynx normal. No inflammation, swelling, exudate, or lesions.  NECK: Neck supple, non-tender without lymphadenopathy, no masses.  PULMONARY - CTA b/l, symmetric breath sounds.   CARDIAC -s1s2, RRR, no M,G,R  ABDOMEN - +BS, ND, NT, soft, no guarding, no rebound, no masses   BACK - no CVA tenderness, Normal  spine   EXTREMITIES - FROM, symmetric pulses, capillary refill < 2 seconds, no edema   SKIN - no rash or bruising   NEUROLOGIC - alert, speech clear, no focal deficits  PSYCH -nl mood/affect, nl insight.  a/p-patient with intermittent left side cp since yesterday, recent extensive w/u for cp with neg stress/echo/duplex le/cta, a/w burping, abd nontender, well appearing, vss, will treat symptoms, check labs, cxr, ekg, monitor, reass.

## 2019-01-05 LAB — SPECIMEN SOURCE: SIGNIFICANT CHANGE UP

## 2019-01-06 LAB
-  AMIKACIN: SIGNIFICANT CHANGE UP
-  AMPICILLIN/SULBACTAM: SIGNIFICANT CHANGE UP
-  AMPICILLIN: SIGNIFICANT CHANGE UP
-  AZTREONAM: SIGNIFICANT CHANGE UP
-  CEFAZOLIN: SIGNIFICANT CHANGE UP
-  CEFEPIME: SIGNIFICANT CHANGE UP
-  CEFOXITIN: SIGNIFICANT CHANGE UP
-  CEFTAZIDIME: SIGNIFICANT CHANGE UP
-  CEFTRIAXONE: SIGNIFICANT CHANGE UP
-  CIPROFLOXACIN: SIGNIFICANT CHANGE UP
-  ERTAPENEM: SIGNIFICANT CHANGE UP
-  GENTAMICIN: SIGNIFICANT CHANGE UP
-  IMIPENEM: SIGNIFICANT CHANGE UP
-  LEVOFLOXACIN: SIGNIFICANT CHANGE UP
-  MEROPENEM: SIGNIFICANT CHANGE UP
-  NITROFURANTOIN: SIGNIFICANT CHANGE UP
-  PIPERACILLIN/TAZOBACTAM: SIGNIFICANT CHANGE UP
-  TIGECYCLINE: SIGNIFICANT CHANGE UP
-  TOBRAMYCIN: SIGNIFICANT CHANGE UP
-  TRIMETHOPRIM/SULFAMETHOXAZOLE: SIGNIFICANT CHANGE UP
BACTERIA UR CULT: SIGNIFICANT CHANGE UP
METHOD TYPE: SIGNIFICANT CHANGE UP
ORGANISM # SPEC MICROSCOPIC CNT: SIGNIFICANT CHANGE UP
ORGANISM # SPEC MICROSCOPIC CNT: SIGNIFICANT CHANGE UP

## 2019-01-14 ENCOUNTER — TRANSCRIPTION ENCOUNTER (OUTPATIENT)
Age: 29
End: 2019-01-14

## 2019-01-14 ENCOUNTER — APPOINTMENT (OUTPATIENT)
Dept: INTERNAL MEDICINE | Facility: CLINIC | Age: 29
End: 2019-01-14
Payer: MEDICAID

## 2019-01-14 VITALS
WEIGHT: 190 LBS | TEMPERATURE: 99.6 F | DIASTOLIC BLOOD PRESSURE: 81 MMHG | HEIGHT: 63 IN | HEART RATE: 69 BPM | BODY MASS INDEX: 33.66 KG/M2 | SYSTOLIC BLOOD PRESSURE: 124 MMHG | OXYGEN SATURATION: 98 %

## 2019-01-14 PROCEDURE — 99213 OFFICE O/P EST LOW 20 MIN: CPT

## 2019-01-14 NOTE — PLAN
[FreeTextEntry1] : Atypical chest and abd pain\par plan\par egd and hida\par consider gb surgery, however does not sound gi??

## 2019-01-14 NOTE — REVIEW OF SYSTEMS
[Chest Pain] : chest pain [Abdominal Pain] : abdominal pain [Fever] : no fever [Earache] : no earache [Nasal Discharge] : no nasal discharge [Orthopnea] : no orthopnea [Shortness Of Breath] : no shortness of breath [Wheezing] : no wheezing

## 2019-01-14 NOTE — HISTORY OF PRESENT ILLNESS
[FreeTextEntry1] : Daily abdominal pain [de-identified] : Daily abdominal pain\par most on left side\par some midline\par not repeated to food\par can last minutes or second\par not better on prilosec\par history of gallstone on ct

## 2019-01-14 NOTE — PHYSICAL EXAM
[No Acute Distress] : no acute distress [Well Nourished] : well nourished [No JVD] : no jugular venous distention [Supple] : supple [No Respiratory Distress] : no respiratory distress  [Clear to Auscultation] : lungs were clear to auscultation bilaterally [Normal Rate] : normal rate  [Regular Rhythm] : with a regular rhythm [Soft] : abdomen soft [No HSM] : no HSM

## 2019-02-07 ENCOUNTER — LABORATORY RESULT (OUTPATIENT)
Age: 29
End: 2019-02-07

## 2019-02-07 ENCOUNTER — APPOINTMENT (OUTPATIENT)
Dept: INTERNAL MEDICINE | Facility: CLINIC | Age: 29
End: 2019-02-07
Payer: MEDICAID

## 2019-02-07 PROCEDURE — 43239 EGD BIOPSY SINGLE/MULTIPLE: CPT

## 2019-02-08 ENCOUNTER — FORM ENCOUNTER (OUTPATIENT)
Age: 29
End: 2019-02-08

## 2019-02-09 ENCOUNTER — APPOINTMENT (OUTPATIENT)
Dept: ULTRASOUND IMAGING | Facility: IMAGING CENTER | Age: 29
End: 2019-02-09
Payer: MEDICAID

## 2019-02-09 ENCOUNTER — OUTPATIENT (OUTPATIENT)
Dept: OUTPATIENT SERVICES | Facility: HOSPITAL | Age: 29
LOS: 1 days | End: 2019-02-09
Payer: MEDICAID

## 2019-02-09 DIAGNOSIS — R10.9 UNSPECIFIED ABDOMINAL PAIN: ICD-10-CM

## 2019-02-09 DIAGNOSIS — Z98.891 HISTORY OF UTERINE SCAR FROM PREVIOUS SURGERY: Chronic | ICD-10-CM

## 2019-02-09 PROCEDURE — 76700 US EXAM ABDOM COMPLETE: CPT | Mod: 26

## 2019-02-09 PROCEDURE — 76700 US EXAM ABDOM COMPLETE: CPT

## 2019-02-13 ENCOUNTER — MEDICATION RENEWAL (OUTPATIENT)
Age: 29
End: 2019-02-13

## 2019-03-04 ENCOUNTER — TRANSCRIPTION ENCOUNTER (OUTPATIENT)
Age: 29
End: 2019-03-04

## 2019-03-25 ENCOUNTER — APPOINTMENT (OUTPATIENT)
Dept: INTERNAL MEDICINE | Facility: CLINIC | Age: 29
End: 2019-03-25
Payer: MEDICAID

## 2019-03-25 VITALS
HEIGHT: 63 IN | SYSTOLIC BLOOD PRESSURE: 96 MMHG | OXYGEN SATURATION: 98 % | DIASTOLIC BLOOD PRESSURE: 63 MMHG | HEART RATE: 66 BPM | BODY MASS INDEX: 33.66 KG/M2 | WEIGHT: 190 LBS | TEMPERATURE: 98.5 F

## 2019-03-25 DIAGNOSIS — L85.3 XEROSIS CUTIS: ICD-10-CM

## 2019-03-25 DIAGNOSIS — R42 DIZZINESS AND GIDDINESS: ICD-10-CM

## 2019-03-25 PROCEDURE — 99214 OFFICE O/P EST MOD 30 MIN: CPT

## 2019-03-25 RX ORDER — CEPHALEXIN 500 MG/1
500 CAPSULE ORAL
Qty: 30 | Refills: 1 | Status: DISCONTINUED | COMMUNITY
Start: 2018-12-17 | End: 2019-03-25

## 2019-03-25 RX ORDER — NAPROXEN 500 MG/1
500 TABLET ORAL TWICE DAILY
Qty: 30 | Refills: 0 | Status: DISCONTINUED | COMMUNITY
Start: 2019-02-13 | End: 2019-03-25

## 2019-03-25 RX ORDER — OMEPRAZOLE 20 MG/1
20 CAPSULE, DELAYED RELEASE ORAL DAILY
Qty: 30 | Refills: 0 | Status: DISCONTINUED | COMMUNITY
Start: 2018-12-17 | End: 2019-03-25

## 2019-03-25 NOTE — PHYSICAL EXAM
[No Acute Distress] : no acute distress [No JVD] : no jugular venous distention [Supple] : supple [No Respiratory Distress] : no respiratory distress  [Clear to Auscultation] : lungs were clear to auscultation bilaterally [Normal Rate] : normal rate  [Regular Rhythm] : with a regular rhythm [de-identified] : congested turbinates

## 2019-03-25 NOTE — ASSESSMENT
[FreeTextEntry1] : Headache/ blurred vision/dizzy\par medrol dospak\par fluticasone\par urea moisturizing cream

## 2019-03-25 NOTE — HISTORY OF PRESENT ILLNESS
[FreeTextEntry1] : numerous issue [de-identified] : Dizzy\par headaches\par blurred vision\par still with chest pain(neg w/u cardiolgist) and abdominal pain now right side\par last 10-15 minute\par not related to food \par has had ct of head and abd\par ultrasound of abdomen\par known to have gallstone\par has had egd and colon\par losing weight\par \par dry skin\par dentist told her to watch for diabetes\par \par

## 2019-03-25 NOTE — REVIEW OF SYSTEMS
[Chest Pain] : chest pain [Orthopnea] : no orthopnea [Abdominal Pain] : no abdominal pain [Dysuria] : no dysuria [FreeTextEntry4] : swollen turbinate

## 2019-04-26 ENCOUNTER — APPOINTMENT (OUTPATIENT)
Dept: INTERNAL MEDICINE | Facility: CLINIC | Age: 29
End: 2019-04-26

## 2019-05-10 ENCOUNTER — APPOINTMENT (OUTPATIENT)
Dept: INTERNAL MEDICINE | Facility: CLINIC | Age: 29
End: 2019-05-10

## 2019-07-29 ENCOUNTER — APPOINTMENT (OUTPATIENT)
Dept: INTERNAL MEDICINE | Facility: CLINIC | Age: 29
End: 2019-07-29
Payer: MEDICAID

## 2019-07-29 VITALS
BODY MASS INDEX: 32.6 KG/M2 | OXYGEN SATURATION: 96 % | TEMPERATURE: 98.5 F | DIASTOLIC BLOOD PRESSURE: 75 MMHG | WEIGHT: 184 LBS | HEART RATE: 81 BPM | SYSTOLIC BLOOD PRESSURE: 113 MMHG | HEIGHT: 63 IN

## 2019-07-29 DIAGNOSIS — M54.12 RADICULOPATHY, CERVICAL REGION: ICD-10-CM

## 2019-07-29 DIAGNOSIS — J30.9 ALLERGIC RHINITIS, UNSPECIFIED: ICD-10-CM

## 2019-07-29 PROCEDURE — 99214 OFFICE O/P EST MOD 30 MIN: CPT

## 2019-07-29 RX ORDER — FLUTICASONE PROPIONATE 50 UG/1
50 SPRAY, METERED NASAL DAILY
Qty: 1 | Refills: 3 | Status: DISCONTINUED | COMMUNITY
Start: 2019-03-25 | End: 2019-07-29

## 2019-07-29 RX ORDER — METHYLPREDNISOLONE 4 MG/1
4 TABLET ORAL
Qty: 21 | Refills: 3 | Status: DISCONTINUED | COMMUNITY
Start: 2019-03-25 | End: 2019-07-29

## 2019-07-29 RX ORDER — UREA 500 MG/G
50 CREAM TOPICAL
Qty: 1 | Refills: 3 | Status: DISCONTINUED | COMMUNITY
Start: 2019-03-25 | End: 2019-07-29

## 2019-07-29 NOTE — HISTORY OF PRESENT ILLNESS
[FreeTextEntry1] : numerous issues again [de-identified] : numerous issue\par chronic chest pains\par pain near umbilicus\par headaches  last minutes, sharp not like migraines\par \par prominent veins legs\par has had mri of c spine\par has had upper endoscopy\par ankles swollen a lot\par weight has been fluctuating\par recent termination of pregnancy\par had egd february\par

## 2019-07-29 NOTE — PLAN
[FreeTextEntry1] : Chest wall pain   costochondritis?\par trial of naproxen 500 bid\par \par headaches and swollen turbinates\par flonase\par \par refer rusty neurology abnormal c spine mri?\par \par Numerous somatic complaints?

## 2019-07-29 NOTE — REVIEW OF SYSTEMS
[Earache] : earache [Chest Pain] : chest pain [Abdominal Pain] : abdominal pain [Nasal Discharge] : no nasal discharge [Orthopnea] : no orthopnea [Shortness Of Breath] : no shortness of breath [Nausea] : no nausea [Constipation] : no constipation [Heartburn] : no heartburn

## 2019-07-29 NOTE — PHYSICAL EXAM
[No Acute Distress] : no acute distress [Well Nourished] : well nourished [Normal Outer Ear/Nose] : the outer ears and nose were normal in appearance [Normal Oropharynx] : the oropharynx was normal [No JVD] : no jugular venous distention [No Lymphadenopathy] : no lymphadenopathy [No Respiratory Distress] : no respiratory distress  [Normal Rate] : normal rate  [No Accessory Muscle Use] : no accessory muscle use [Regular Rhythm] : with a regular rhythm [Soft] : abdomen soft [No HSM] : no HSM [de-identified] : turbinates swolllen bilateral with normal ears [de-identified] : some chest wall pain [de-identified] : some small superficial veins abdominal wall and upper thigh

## 2019-08-22 ENCOUNTER — APPOINTMENT (OUTPATIENT)
Dept: NEUROLOGY | Facility: CLINIC | Age: 29
End: 2019-08-22
Payer: MEDICAID

## 2019-08-22 VITALS
DIASTOLIC BLOOD PRESSURE: 80 MMHG | HEIGHT: 63 IN | WEIGHT: 180 LBS | SYSTOLIC BLOOD PRESSURE: 114 MMHG | HEART RATE: 79 BPM | BODY MASS INDEX: 31.89 KG/M2

## 2019-08-22 DIAGNOSIS — Z87.898 PERSONAL HISTORY OF OTHER SPECIFIED CONDITIONS: ICD-10-CM

## 2019-08-22 DIAGNOSIS — Z65.9 PROBLEM RELATED TO UNSPECIFIED PSYCHOSOCIAL CIRCUMSTANCES: ICD-10-CM

## 2019-08-22 DIAGNOSIS — R51 HEADACHE: ICD-10-CM

## 2019-08-22 PROCEDURE — 99204 OFFICE O/P NEW MOD 45 MIN: CPT
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
## 2019-08-22 NOTE — REVIEW OF SYSTEMS
[Feeling Poorly] : feeling poorly [Anxiety] : anxiety [Chest Pain] : chest pain [As Noted in HPI] : as noted in HPI [Negative] : Endocrine

## 2019-08-22 NOTE — PHYSICAL EXAM
[Oriented To Time, Place, And Person] : oriented to person, place, and time [Impaired Insight] : insight and judgment were intact [Affect] : the affect was normal [Person] : oriented to person [Place] : oriented to place [Time] : oriented to time [Concentration Intact] : normal concentrating ability [Visual Intact] : visual attention was ~T not ~L decreased [Naming Objects] : no difficulty naming common objects [Writing A Sentence] : no difficulty writing a sentence [Repeating Phrases] : no difficulty repeating a phrase [Comprehension] : comprehension intact [Fluency] : fluency intact [Past History] : adequate knowledge of personal past history [Reading] : reading intact [Cranial Nerves Oculomotor (III)] : extraocular motion intact [Cranial Nerves Trigeminal (V)] : facial sensation intact symmetrically [Cranial Nerves Optic (II)] : visual acuity intact bilaterally,  visual fields full to confrontation, pupils equal round and reactive to light [Cranial Nerves Facial (VII)] : face symmetrical [Cranial Nerves Vestibulocochlear (VIII)] : hearing was intact bilaterally [Cranial Nerves Accessory (XI - Cranial And Spinal)] : head turning and shoulder shrug symmetric [Cranial Nerves Glossopharyngeal (IX)] : tongue and palate midline [Cranial Nerves Hypoglossal (XII)] : there was no tongue deviation with protrusion [Motor Strength] : muscle strength was normal in all four extremities [Sensation Tactile Decrease] : light touch was intact [No Muscle Atrophy] : normal bulk in all four extremities [Balance] : balance was intact [3+] : Ankle jerk right 3+ [2+] : Ankle jerk left 2+ [Sclera] : the sclera and conjunctiva were normal [PERRL With Normal Accommodation] : pupils were equal in size, round, reactive to light, with normal accommodation [Extraocular Movements] : extraocular movements were intact [Outer Ear] : the ears and nose were normal in appearance [Oropharynx] : the oropharynx was normal [Neck Appearance] : the appearance of the neck was normal [Jugular Venous Distention Increased] : there was no jugular-venous distention [Auscultation Breath Sounds / Voice Sounds] : lungs were clear to auscultation bilaterally [Heart Rate And Rhythm] : heart rate was normal and rhythm regular [Heart Sounds] : normal S1 and S2 [Murmurs] : no murmurs [Heart Sounds Gallop] : no gallops [Full Pulse] : the pedal pulses are present [Heart Sounds Pericardial Friction Rub] : no pericardial rub [No CVA Tenderness] : no ~M costovertebral angle tenderness [Edema] : there was no peripheral edema [No Spinal Tenderness] : no spinal tenderness [Abnormal Walk] : normal gait [Musculoskeletal - Swelling] : no joint swelling seen [Nail Clubbing] : no clubbing  or cyanosis of the fingernails [Skin Color & Pigmentation] : normal skin color and pigmentation [Motor Tone] : muscle strength and tone were normal [] : no rash [Skin Turgor] : normal skin turgor [Tremor] : no tremor present [Past-pointing] : there was no past-pointing [Plantar Reflex Right Only] : normal on the right [Plantar Reflex Left Only] : normal on the left [FreeTextEntry1] : There is limitation of range of motion of the neck, particularly in extension. She has increased pain with both flexion and extension, as well as with right lateral rotation. There is a very large tender point in the left trapezius.

## 2019-09-25 ENCOUNTER — APPOINTMENT (OUTPATIENT)
Dept: PAIN MANAGEMENT | Facility: CLINIC | Age: 29
End: 2019-09-25

## 2019-09-25 VITALS
HEART RATE: 78 BPM | WEIGHT: 190 LBS | BODY MASS INDEX: 33.66 KG/M2 | DIASTOLIC BLOOD PRESSURE: 69 MMHG | HEIGHT: 63 IN | SYSTOLIC BLOOD PRESSURE: 105 MMHG

## 2019-09-25 NOTE — REVIEW OF SYSTEMS
[Feeling Poorly] : feeling poorly [Chest Pain] : chest pain [Anxiety] : anxiety [As Noted in HPI] : as noted in HPI [Negative] : Endocrine
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
## 2019-10-04 NOTE — PHYSICAL EXAM
[General Appearance - Alert] : alert [General Appearance - In No Acute Distress] : in no acute distress [General Appearance - Well Nourished] : well nourished [Oriented To Time, Place, And Person] : oriented to person, place, and time [Impaired Insight] : insight and judgment were intact [Affect] : the affect was normal [Person] : oriented to person [Place] : oriented to place [Time] : oriented to time [Concentration Intact] : normal concentrating ability [Naming Objects] : no difficulty naming common objects [Visual Intact] : visual attention was ~T not ~L decreased [Repeating Phrases] : no difficulty repeating a phrase [Writing A Sentence] : no difficulty writing a sentence [Fluency] : fluency intact [Comprehension] : comprehension intact [Reading] : reading intact [Past History] : adequate knowledge of personal past history [Cranial Nerves Optic (II)] : visual acuity intact bilaterally,  visual fields full to confrontation, pupils equal round and reactive to light [Cranial Nerves Oculomotor (III)] : extraocular motion intact [Cranial Nerves Trigeminal (V)] : facial sensation intact symmetrically [Cranial Nerves Facial (VII)] : face symmetrical [Cranial Nerves Glossopharyngeal (IX)] : tongue and palate midline [Cranial Nerves Vestibulocochlear (VIII)] : hearing was intact bilaterally [Cranial Nerves Hypoglossal (XII)] : there was no tongue deviation with protrusion [Cranial Nerves Accessory (XI - Cranial And Spinal)] : head turning and shoulder shrug symmetric [Motor Strength] : muscle strength was normal in all four extremities [No Muscle Atrophy] : normal bulk in all four extremities [Sensation Tactile Decrease] : light touch was intact [Balance] : balance was intact [3+] : Ankle jerk right 3+ [2+] : Ankle jerk left 2+ [Sclera] : the sclera and conjunctiva were normal [Extraocular Movements] : extraocular movements were intact [PERRL With Normal Accommodation] : pupils were equal in size, round, reactive to light, with normal accommodation [Outer Ear] : the ears and nose were normal in appearance [Oropharynx] : the oropharynx was normal [Neck Appearance] : the appearance of the neck was normal [Jugular Venous Distention Increased] : there was no jugular-venous distention [Auscultation Breath Sounds / Voice Sounds] : lungs were clear to auscultation bilaterally [Heart Sounds] : normal S1 and S2 [Murmurs] : no murmurs [Heart Sounds Gallop] : no gallops [Heart Sounds Pericardial Friction Rub] : no pericardial rub [Full Pulse] : the pedal pulses are present [Edema] : there was no peripheral edema [No CVA Tenderness] : no ~M costovertebral angle tenderness [No Spinal Tenderness] : no spinal tenderness [Abnormal Walk] : normal gait [Nail Clubbing] : no clubbing  or cyanosis of the fingernails [Musculoskeletal - Swelling] : no joint swelling seen [Motor Tone] : muscle strength and tone were normal [Skin Color & Pigmentation] : normal skin color and pigmentation [Skin Turgor] : normal skin turgor [] : no rash [Past-pointing] : there was no past-pointing [Tremor] : no tremor present [Plantar Reflex Right Only] : normal on the right [Plantar Reflex Left Only] : normal on the left [FreeTextEntry1] : There is limitation of range of motion of the neck, particularly in extension. She has increased pain with both flexion and extension, as well as with right lateral rotation. There is a very large tender point in the left trapezius.

## 2019-10-22 ENCOUNTER — APPOINTMENT (OUTPATIENT)
Dept: PHYSICAL MEDICINE AND REHAB | Facility: CLINIC | Age: 29
End: 2019-10-22

## 2019-11-07 ENCOUNTER — APPOINTMENT (OUTPATIENT)
Dept: INTERNAL MEDICINE | Facility: CLINIC | Age: 29
End: 2019-11-07
Payer: MEDICAID

## 2019-11-07 VITALS
HEART RATE: 85 BPM | OXYGEN SATURATION: 97 % | HEIGHT: 63 IN | BODY MASS INDEX: 33.66 KG/M2 | WEIGHT: 190 LBS | TEMPERATURE: 98.2 F

## 2019-11-07 PROCEDURE — 81003 URINALYSIS AUTO W/O SCOPE: CPT | Mod: QW

## 2019-11-07 PROCEDURE — 99213 OFFICE O/P EST LOW 20 MIN: CPT | Mod: 25

## 2019-11-07 PROCEDURE — 36415 COLL VENOUS BLD VENIPUNCTURE: CPT

## 2019-11-07 RX ORDER — FLUTICASONE PROPIONATE 50 UG/1
50 SPRAY, METERED NASAL DAILY
Qty: 1 | Refills: 2 | Status: DISCONTINUED | COMMUNITY
Start: 2019-07-29 | End: 2019-11-07

## 2019-11-07 RX ORDER — NAPROXEN 500 MG/1
500 TABLET ORAL TWICE DAILY
Qty: 60 | Refills: 0 | Status: DISCONTINUED | COMMUNITY
Start: 2019-07-29 | End: 2019-11-07

## 2019-11-07 NOTE — HISTORY OF PRESENT ILLNESS
[FreeTextEntry1] : left lower quadrant pain [de-identified] : left lower quadrant pain\par last gyn post partum son 1 year old  Termination of pregnancy june 2019\par mild constipation\par also chronic neck pain\par

## 2019-11-07 NOTE — REVIEW OF SYSTEMS
[Abdominal Pain] : abdominal pain [Dysuria] : dysuria [Negative] : Respiratory [Chest Pain] : no chest pain [Orthopnea] : no orthopnea [Wheezing] : no wheezing [Shortness Of Breath] : no shortness of breath [Vomiting] : no vomiting [Hematuria] : no hematuria [Heartburn] : no heartburn

## 2019-11-07 NOTE — PLAN
[FreeTextEntry1] : left lower abdominal pain\par pelvic sono\par urine culture\par need f/u gyn\par if pain worsens to er\par diclofenac 75 bid and omeprazole 20 bid\par std testing

## 2019-11-08 ENCOUNTER — MEDICATION RENEWAL (OUTPATIENT)
Age: 29
End: 2019-11-08

## 2019-11-10 LAB
ALBUMIN SERPL ELPH-MCNC: 4.6 G/DL
ALP BLD-CCNC: 65 U/L
ALT SERPL-CCNC: 14 U/L
ANION GAP SERPL CALC-SCNC: 17 MMOL/L
AST SERPL-CCNC: 13 U/L
BASOPHILS # BLD AUTO: 0.02 K/UL
BASOPHILS NFR BLD AUTO: 0.3 %
BILIRUB SERPL-MCNC: <0.2 MG/DL
BUN SERPL-MCNC: 10 MG/DL
C TRACH RRNA SPEC QL NAA+PROBE: NOT DETECTED
CALCIUM SERPL-MCNC: 9.4 MG/DL
CHLORIDE SERPL-SCNC: 101 MMOL/L
CO2 SERPL-SCNC: 22 MMOL/L
CREAT SERPL-MCNC: 0.58 MG/DL
EOSINOPHIL # BLD AUTO: 0.22 K/UL
EOSINOPHIL NFR BLD AUTO: 3.2 %
GLUCOSE SERPL-MCNC: 100 MG/DL
HCT VFR BLD CALC: 40.2 %
HGB BLD-MCNC: 12.8 G/DL
HIV1+2 AB SPEC QL IA.RAPID: NONREACTIVE
HSV 1+2 IGG SER IA-IMP: NEGATIVE
HSV 1+2 IGG SER IA-IMP: POSITIVE
HSV1 IGG SER QL: 0.22 INDEX
HSV2 IGG SER QL: 8.59 INDEX
IMM GRANULOCYTES NFR BLD AUTO: 0.3 %
LYMPHOCYTES # BLD AUTO: 1.65 K/UL
LYMPHOCYTES NFR BLD AUTO: 23.8 %
MAN DIFF?: NORMAL
MCHC RBC-ENTMCNC: 28.4 PG
MCHC RBC-ENTMCNC: 31.8 GM/DL
MCV RBC AUTO: 89.1 FL
MONOCYTES # BLD AUTO: 0.49 K/UL
MONOCYTES NFR BLD AUTO: 7.1 %
N GONORRHOEA RRNA SPEC QL NAA+PROBE: NOT DETECTED
NEUTROPHILS # BLD AUTO: 4.53 K/UL
NEUTROPHILS NFR BLD AUTO: 65.3 %
PLATELET # BLD AUTO: 209 K/UL
POTASSIUM SERPL-SCNC: 4.5 MMOL/L
PROT SERPL-MCNC: 7.4 G/DL
RBC # BLD: 4.51 M/UL
RBC # FLD: 12.8 %
SODIUM SERPL-SCNC: 140 MMOL/L
SOURCE AMPLIFICATION: NORMAL
T PALLIDUM AB SER QL IA: NEGATIVE
WBC # FLD AUTO: 6.93 K/UL

## 2019-11-19 ENCOUNTER — APPOINTMENT (OUTPATIENT)
Dept: ULTRASOUND IMAGING | Facility: CLINIC | Age: 29
End: 2019-11-19

## 2019-12-06 ENCOUNTER — EMERGENCY (EMERGENCY)
Facility: HOSPITAL | Age: 29
LOS: 1 days | Discharge: HOME | End: 2019-12-06
Attending: EMERGENCY MEDICINE | Admitting: EMERGENCY MEDICINE
Payer: MEDICAID

## 2019-12-06 VITALS
HEART RATE: 72 BPM | SYSTOLIC BLOOD PRESSURE: 101 MMHG | DIASTOLIC BLOOD PRESSURE: 56 MMHG | TEMPERATURE: 97 F | RESPIRATION RATE: 17 BRPM | OXYGEN SATURATION: 100 %

## 2019-12-06 VITALS
SYSTOLIC BLOOD PRESSURE: 127 MMHG | HEART RATE: 88 BPM | TEMPERATURE: 98 F | RESPIRATION RATE: 18 BRPM | OXYGEN SATURATION: 100 % | DIASTOLIC BLOOD PRESSURE: 70 MMHG

## 2019-12-06 DIAGNOSIS — Z98.891 HISTORY OF UTERINE SCAR FROM PREVIOUS SURGERY: Chronic | ICD-10-CM

## 2019-12-06 LAB
ALBUMIN SERPL ELPH-MCNC: 4.2 G/DL — SIGNIFICANT CHANGE UP (ref 3.3–5)
ALP SERPL-CCNC: 63 U/L — SIGNIFICANT CHANGE UP (ref 40–120)
ALT FLD-CCNC: 20 U/L — SIGNIFICANT CHANGE UP (ref 4–33)
ANION GAP SERPL CALC-SCNC: 12 MMO/L — SIGNIFICANT CHANGE UP (ref 7–14)
APPEARANCE UR: CLEAR — SIGNIFICANT CHANGE UP
AST SERPL-CCNC: 16 U/L — SIGNIFICANT CHANGE UP (ref 4–32)
BACTERIA # UR AUTO: NEGATIVE — SIGNIFICANT CHANGE UP
BASOPHILS # BLD AUTO: 0.02 K/UL — SIGNIFICANT CHANGE UP (ref 0–0.2)
BASOPHILS NFR BLD AUTO: 0.4 % — SIGNIFICANT CHANGE UP (ref 0–2)
BILIRUB SERPL-MCNC: 0.3 MG/DL — SIGNIFICANT CHANGE UP (ref 0.2–1.2)
BILIRUB UR-MCNC: NEGATIVE — SIGNIFICANT CHANGE UP
BLOOD UR QL VISUAL: NEGATIVE — SIGNIFICANT CHANGE UP
BUN SERPL-MCNC: 13 MG/DL — SIGNIFICANT CHANGE UP (ref 7–23)
CALCIUM SERPL-MCNC: 9.4 MG/DL — SIGNIFICANT CHANGE UP (ref 8.4–10.5)
CHLORIDE SERPL-SCNC: 101 MMOL/L — SIGNIFICANT CHANGE UP (ref 98–107)
CO2 SERPL-SCNC: 25 MMOL/L — SIGNIFICANT CHANGE UP (ref 22–31)
COLOR SPEC: YELLOW — SIGNIFICANT CHANGE UP
CREAT SERPL-MCNC: 0.59 MG/DL — SIGNIFICANT CHANGE UP (ref 0.5–1.3)
EOSINOPHIL # BLD AUTO: 0.06 K/UL — SIGNIFICANT CHANGE UP (ref 0–0.5)
EOSINOPHIL NFR BLD AUTO: 1.1 % — SIGNIFICANT CHANGE UP (ref 0–6)
GLUCOSE SERPL-MCNC: 95 MG/DL — SIGNIFICANT CHANGE UP (ref 70–99)
GLUCOSE UR-MCNC: NEGATIVE — SIGNIFICANT CHANGE UP
HCT VFR BLD CALC: 41.1 % — SIGNIFICANT CHANGE UP (ref 34.5–45)
HGB BLD-MCNC: 13.5 G/DL — SIGNIFICANT CHANGE UP (ref 11.5–15.5)
HYALINE CASTS # UR AUTO: NEGATIVE — SIGNIFICANT CHANGE UP
IMM GRANULOCYTES NFR BLD AUTO: 0.4 % — SIGNIFICANT CHANGE UP (ref 0–1.5)
KETONES UR-MCNC: NEGATIVE — SIGNIFICANT CHANGE UP
LEUKOCYTE ESTERASE UR-ACNC: NEGATIVE — SIGNIFICANT CHANGE UP
LIDOCAIN IGE QN: 30.3 U/L — SIGNIFICANT CHANGE UP (ref 7–60)
LYMPHOCYTES # BLD AUTO: 1.51 K/UL — SIGNIFICANT CHANGE UP (ref 1–3.3)
LYMPHOCYTES # BLD AUTO: 27.1 % — SIGNIFICANT CHANGE UP (ref 13–44)
MCHC RBC-ENTMCNC: 28.4 PG — SIGNIFICANT CHANGE UP (ref 27–34)
MCHC RBC-ENTMCNC: 32.8 % — SIGNIFICANT CHANGE UP (ref 32–36)
MCV RBC AUTO: 86.5 FL — SIGNIFICANT CHANGE UP (ref 80–100)
MONOCYTES # BLD AUTO: 0.39 K/UL — SIGNIFICANT CHANGE UP (ref 0–0.9)
MONOCYTES NFR BLD AUTO: 7 % — SIGNIFICANT CHANGE UP (ref 2–14)
NEUTROPHILS # BLD AUTO: 3.57 K/UL — SIGNIFICANT CHANGE UP (ref 1.8–7.4)
NEUTROPHILS NFR BLD AUTO: 64 % — SIGNIFICANT CHANGE UP (ref 43–77)
NITRITE UR-MCNC: NEGATIVE — SIGNIFICANT CHANGE UP
NRBC # FLD: 0 K/UL — SIGNIFICANT CHANGE UP (ref 0–0)
PH UR: 6.5 — SIGNIFICANT CHANGE UP (ref 5–8)
PLATELET # BLD AUTO: 191 K/UL — SIGNIFICANT CHANGE UP (ref 150–400)
PMV BLD: 10.4 FL — SIGNIFICANT CHANGE UP (ref 7–13)
POTASSIUM SERPL-MCNC: 4.1 MMOL/L — SIGNIFICANT CHANGE UP (ref 3.5–5.3)
POTASSIUM SERPL-SCNC: 4.1 MMOL/L — SIGNIFICANT CHANGE UP (ref 3.5–5.3)
PROT SERPL-MCNC: 7.8 G/DL — SIGNIFICANT CHANGE UP (ref 6–8.3)
PROT UR-MCNC: 20 — SIGNIFICANT CHANGE UP
RBC # BLD: 4.75 M/UL — SIGNIFICANT CHANGE UP (ref 3.8–5.2)
RBC # FLD: 12.2 % — SIGNIFICANT CHANGE UP (ref 10.3–14.5)
RBC CASTS # UR COMP ASSIST: SIGNIFICANT CHANGE UP (ref 0–?)
SODIUM SERPL-SCNC: 138 MMOL/L — SIGNIFICANT CHANGE UP (ref 135–145)
SP GR SPEC: 1.03 — SIGNIFICANT CHANGE UP (ref 1–1.04)
SQUAMOUS # UR AUTO: SIGNIFICANT CHANGE UP
UROBILINOGEN FLD QL: NORMAL — SIGNIFICANT CHANGE UP
WBC # BLD: 5.57 K/UL — SIGNIFICANT CHANGE UP (ref 3.8–10.5)
WBC # FLD AUTO: 5.57 K/UL — SIGNIFICANT CHANGE UP (ref 3.8–10.5)
WBC UR QL: SIGNIFICANT CHANGE UP (ref 0–?)

## 2019-12-06 PROCEDURE — 99284 EMERGENCY DEPT VISIT MOD MDM: CPT

## 2019-12-06 PROCEDURE — 74177 CT ABD & PELVIS W/CONTRAST: CPT | Mod: 26

## 2019-12-06 PROCEDURE — 76700 US EXAM ABDOM COMPLETE: CPT | Mod: 26

## 2019-12-06 RX ORDER — FAMOTIDINE 10 MG/ML
40 INJECTION INTRAVENOUS ONCE
Refills: 0 | Status: COMPLETED | OUTPATIENT
Start: 2019-12-06 | End: 2019-12-06

## 2019-12-06 RX ADMIN — FAMOTIDINE 40 MILLIGRAM(S): 10 INJECTION INTRAVENOUS at 15:15

## 2019-12-06 NOTE — ED ADULT NURSE NOTE - OBJECTIVE STATEMENT
Pt a&ox3 c/o Lt sided flank pain for the past two days, intermittent, denies n/v/d, no dysuria/hematuria, afebrile, no pmh, breathing even and unlabored, ivl placed, labs sent, will continue to monitor.

## 2019-12-06 NOTE — ED PROVIDER NOTE - OBJECTIVE STATEMENT
29F presents with L flank and LUQ Pain x2 days. Pt reports pain is constant, associated with some nausea no vomiting, no change in bowel habits. She notes some pressure when she urinates as well. No f/c/s, she is eating and drinking but notes some increase in pain with food. 29F presents with L flank and LUQ Pain and RUQ x2 days. Pt reports pain is constant, associated with some nausea no vomiting, no change in bowel habits. She notes some pressure when she urinates as well. No f/c/s, she is eating and drinking but notes some increase in pain with food.

## 2019-12-06 NOTE — ED PROVIDER NOTE - PATIENT PORTAL LINK FT
You can access the FollowMyHealth Patient Portal offered by [facility] by registering at the following website: [email or web address]. By joining [facility]’s FollowMyHealth portal, you will also be able to view your health information using other applications (apps) compatible with our system.

## 2019-12-06 NOTE — ED PROVIDER NOTE - PHYSICAL EXAMINATION
GEN - NAD; well appearing; A+O x3   HEAD - NC/AT   EYES- PERRL, EOMI  ENT: Airway patent, mmm  NECK: Neck supple  PULMONARY - CTA b/l, symmetric breath sounds.   CARDIAC -s1s2, RRR, no M,G,R  ABDOMEN - +BS, ND, NT, soft, no guarding, no rebound, no masses   BACK - no CVA tenderness, Normal  spine   EXTREMITIES - FROM  SKIN - no rash or bruising   NEUROLOGIC - alert, speech clear, normal gait   PSYCH -nl mood/affect, nl insight. GEN - NAD; well appearing; A+O x3   HEAD - NC/AT   EYES- PERRL, EOMI  ENT: Airway patent, mmm  NECK: Neck supple  PULMONARY - CTA b/l, symmetric breath sounds.   CARDIAC -s1s2, RRR, no M,G,R  ABDOMEN - +BS, ND, +TTP LUQ/flank, soft, no guarding, no rebound, no masses   BACK - no CVA tenderness, Normal  spine   EXTREMITIES - FROM  SKIN - no rash or bruising   NEUROLOGIC - alert, speech clear, normal gait   PSYCH -nl mood/affect, nl insight. GEN - NAD; well appearing; A+O x3   HEAD - NC/AT   EYES- PERRL, EOMI  ENT: Airway patent, mmm  NECK: Neck supple  PULMONARY - CTA b/l, symmetric breath sounds.   CARDIAC -s1s2, RRR, no M,G,R  ABDOMEN - +BS, ND, +TTP LUQ/flank and RUQ, soft, no guarding, no rebound, no masses   BACK - no CVA tenderness, Normal  spine   EXTREMITIES - FROM  SKIN - no rash or bruising   NEUROLOGIC - alert, speech clear, normal gait   PSYCH -nl mood/affect, nl insight.

## 2019-12-06 NOTE — ED PROVIDER NOTE - CLINICAL SUMMARY MEDICAL DECISION MAKING FREE TEXT BOX
Pt presents with 2 days of LUQ pain and L flank pain with some nausea and pressure with urination. Pending labs and UA. Pt presents with 2 days of LUQ pain and L flank pain with some nausea and pressure with urination. Pending labs, CTAP and UA.

## 2019-12-07 LAB
BACTERIA UR CULT: SIGNIFICANT CHANGE UP
SPECIMEN SOURCE: SIGNIFICANT CHANGE UP

## 2019-12-08 RX ORDER — CEPHALEXIN 500 MG
1 CAPSULE ORAL
Qty: 14 | Refills: 0
Start: 2019-12-08 | End: 2019-12-14

## 2019-12-08 NOTE — ED POST DISCHARGE NOTE - RESULT SUMMARY
UCX: Streptococcus agalactiae Gp B >100.000 CFU/ml No antibiotic listed in ED provider note or prescription writer at time of discharge. Patient contacted denies fever, chills or dysuria. Discussed with patient will ERX Keflex 500mg BID X & days and patient to follow up with MD. Discussed with patient need to return to ED if symptoms don't continue to improve or recur or develops any new or worsening symptoms that are of concern.

## 2020-01-20 ENCOUNTER — APPOINTMENT (OUTPATIENT)
Dept: INTERNAL MEDICINE | Facility: CLINIC | Age: 30
End: 2020-01-20

## 2020-02-09 ENCOUNTER — LABORATORY RESULT (OUTPATIENT)
Age: 30
End: 2020-02-09

## 2020-02-10 ENCOUNTER — APPOINTMENT (OUTPATIENT)
Dept: INTERNAL MEDICINE | Facility: CLINIC | Age: 30
End: 2020-02-10
Payer: MEDICAID

## 2020-02-10 VITALS
SYSTOLIC BLOOD PRESSURE: 118 MMHG | BODY MASS INDEX: 38.09 KG/M2 | HEIGHT: 63 IN | HEART RATE: 96 BPM | TEMPERATURE: 99.4 F | DIASTOLIC BLOOD PRESSURE: 78 MMHG | WEIGHT: 215 LBS

## 2020-02-10 DIAGNOSIS — N39.0 URINARY TRACT INFECTION, SITE NOT SPECIFIED: ICD-10-CM

## 2020-02-10 PROCEDURE — 36415 COLL VENOUS BLD VENIPUNCTURE: CPT

## 2020-02-10 PROCEDURE — 99213 OFFICE O/P EST LOW 20 MIN: CPT | Mod: 25

## 2020-02-10 NOTE — HISTORY OF PRESENT ILLNESS
[FreeTextEntry1] : urine issue [de-identified] : recurrent urine frequency and left flank plain\par dec  had ultrasound and ct\par gallstone bu no kidney problem\par given abx(unsure of which one but twice daily) got better but now the same\par strep last time\par no fever or chills

## 2020-02-10 NOTE — REVIEW OF SYSTEMS
[Fever] : no fever [Night Sweats] : no night sweats [Earache] : no earache [Nasal Discharge] : no nasal discharge [Orthopnea] : no orthopnea

## 2020-02-10 NOTE — PHYSICAL EXAM
[Normal] : normal rate, regular rhythm, normal S1 and S2 and no murmur heard [Soft] : abdomen soft [de-identified] : left flank pain

## 2020-02-12 LAB — BACTERIA UR CULT: NORMAL

## 2020-02-21 ENCOUNTER — APPOINTMENT (OUTPATIENT)
Dept: INTERNAL MEDICINE | Facility: CLINIC | Age: 30
End: 2020-02-21

## 2020-11-02 ENCOUNTER — APPOINTMENT (OUTPATIENT)
Dept: INTERNAL MEDICINE | Facility: CLINIC | Age: 30
End: 2020-11-02
Payer: MEDICAID

## 2020-11-02 VITALS
TEMPERATURE: 99.1 F | HEART RATE: 84 BPM | HEIGHT: 63 IN | DIASTOLIC BLOOD PRESSURE: 81 MMHG | SYSTOLIC BLOOD PRESSURE: 122 MMHG | OXYGEN SATURATION: 98 % | WEIGHT: 240 LBS | BODY MASS INDEX: 42.52 KG/M2

## 2020-11-02 DIAGNOSIS — R10.9 UNSPECIFIED ABDOMINAL PAIN: ICD-10-CM

## 2020-11-02 DIAGNOSIS — K30 FUNCTIONAL DYSPEPSIA: ICD-10-CM

## 2020-11-02 PROCEDURE — 99072 ADDL SUPL MATRL&STAF TM PHE: CPT

## 2020-11-02 PROCEDURE — 36415 COLL VENOUS BLD VENIPUNCTURE: CPT

## 2020-11-02 PROCEDURE — 99213 OFFICE O/P EST LOW 20 MIN: CPT | Mod: 25

## 2020-11-02 RX ORDER — SULFAMETHOXAZOLE AND TRIMETHOPRIM 800; 160 MG/1; MG/1
800-160 TABLET ORAL TWICE DAILY
Qty: 20 | Refills: 0 | Status: DISCONTINUED | COMMUNITY
Start: 2020-02-10 | End: 2020-11-02

## 2020-11-02 RX ORDER — DICLOFENAC SODIUM 75 MG/1
75 TABLET, DELAYED RELEASE ORAL
Qty: 1 | Refills: 0 | Status: DISCONTINUED | COMMUNITY
Start: 2019-11-07 | End: 2020-11-02

## 2020-11-02 NOTE — PLAN
[FreeTextEntry1] : Check abdominal US to evaluate GB, renal\par Check labs\par Check UA\par LE pains? check electrolytes and B12\par Trial of Pepcid for indigestion

## 2020-11-02 NOTE — PHYSICAL EXAM
[No Acute Distress] : no acute distress [Well Nourished] : well nourished [Well Developed] : well developed [No Respiratory Distress] : no respiratory distress  [No Accessory Muscle Use] : no accessory muscle use [Clear to Auscultation] : lungs were clear to auscultation bilaterally [Normal Rate] : normal rate  [Regular Rhythm] : with a regular rhythm [Normal S1, S2] : normal S1 and S2 [Soft] : abdomen soft [Non-distended] : non-distended [Normal Bowel Sounds] : normal bowel sounds [No Focal Deficits] : no focal deficits [Alert and Oriented x3] : oriented to person, place, and time [de-identified] : RUQ discomfort to deep palpation

## 2020-11-02 NOTE — REVIEW OF SYSTEMS
[Abdominal Pain] : abdominal pain [Heartburn] : heartburn [Negative] : Neurological [Constipation] : no constipation [Diarrhea] : diarrhea [Vomiting] : no vomiting

## 2020-11-02 NOTE — HISTORY OF PRESENT ILLNESS
[de-identified] : She has been dealing with intermittent chest and abdominal pains since the birth of her child in 2018. \par She is getting heartburn after eating any sort of food, even water causes some abdominal discomfort. The pain can be located on her right or left side. She recently also notes more gas and bloating. Denies V/D/C, urinary concerns, fever/chills.

## 2020-11-03 LAB
ALBUMIN SERPL ELPH-MCNC: 4.4 G/DL
ALP BLD-CCNC: 87 U/L
ALT SERPL-CCNC: 26 U/L
ANION GAP SERPL CALC-SCNC: 11 MMOL/L
APPEARANCE: CLEAR
AST SERPL-CCNC: 14 U/L
BACTERIA: NEGATIVE
BASOPHILS # BLD AUTO: 0.02 K/UL
BASOPHILS NFR BLD AUTO: 0.3 %
BILIRUB SERPL-MCNC: <0.2 MG/DL
BILIRUBIN URINE: NEGATIVE
BLOOD URINE: NEGATIVE
BUN SERPL-MCNC: 8 MG/DL
CALCIUM SERPL-MCNC: 9.6 MG/DL
CHLORIDE SERPL-SCNC: 99 MMOL/L
CO2 SERPL-SCNC: 27 MMOL/L
COLOR: NORMAL
CREAT SERPL-MCNC: 0.59 MG/DL
EOSINOPHIL # BLD AUTO: 0.1 K/UL
EOSINOPHIL NFR BLD AUTO: 1.3 %
ESTIMATED AVERAGE GLUCOSE: 111 MG/DL
FOLATE SERPL-MCNC: 7 NG/ML
GLUCOSE QUALITATIVE U: NEGATIVE
GLUCOSE SERPL-MCNC: 95 MG/DL
HBA1C MFR BLD HPLC: 5.5 %
HCT VFR BLD CALC: 40.3 %
HGB BLD-MCNC: 12.5 G/DL
HYALINE CASTS: 0 /LPF
IMM GRANULOCYTES NFR BLD AUTO: 0.1 %
KETONES URINE: NEGATIVE
LEUKOCYTE ESTERASE URINE: NEGATIVE
LYMPHOCYTES # BLD AUTO: 1.95 K/UL
LYMPHOCYTES NFR BLD AUTO: 26.2 %
MAN DIFF?: NORMAL
MCHC RBC-ENTMCNC: 26.2 PG
MCHC RBC-ENTMCNC: 31 GM/DL
MCV RBC AUTO: 84.5 FL
MICROSCOPIC-UA: NORMAL
MONOCYTES # BLD AUTO: 0.41 K/UL
MONOCYTES NFR BLD AUTO: 5.5 %
NEUTROPHILS # BLD AUTO: 4.95 K/UL
NEUTROPHILS NFR BLD AUTO: 66.6 %
NITRITE URINE: NEGATIVE
PH URINE: 6
PLATELET # BLD AUTO: 219 K/UL
POTASSIUM SERPL-SCNC: 4.2 MMOL/L
PROT SERPL-MCNC: 7.2 G/DL
PROTEIN URINE: NEGATIVE
RBC # BLD: 4.77 M/UL
RBC # FLD: 14.1 %
RED BLOOD CELLS URINE: 0 /HPF
SODIUM SERPL-SCNC: 137 MMOL/L
SPECIFIC GRAVITY URINE: 1.02
SQUAMOUS EPITHELIAL CELLS: 2 /HPF
TSH SERPL-ACNC: 0.96 UIU/ML
UROBILINOGEN URINE: NORMAL
VIT B12 SERPL-MCNC: 403 PG/ML
WBC # FLD AUTO: 7.44 K/UL
WHITE BLOOD CELLS URINE: 1 /HPF

## 2020-11-12 ENCOUNTER — APPOINTMENT (OUTPATIENT)
Dept: INTERNAL MEDICINE | Facility: CLINIC | Age: 30
End: 2020-11-12

## 2020-11-21 ENCOUNTER — OUTPATIENT (OUTPATIENT)
Dept: OUTPATIENT SERVICES | Facility: HOSPITAL | Age: 30
LOS: 1 days | End: 2020-11-21
Payer: MEDICAID

## 2020-11-21 ENCOUNTER — APPOINTMENT (OUTPATIENT)
Dept: ULTRASOUND IMAGING | Facility: IMAGING CENTER | Age: 30
End: 2020-11-21
Payer: MEDICAID

## 2020-11-21 DIAGNOSIS — Z98.891 HISTORY OF UTERINE SCAR FROM PREVIOUS SURGERY: Chronic | ICD-10-CM

## 2020-11-21 DIAGNOSIS — R10.9 UNSPECIFIED ABDOMINAL PAIN: ICD-10-CM

## 2020-11-21 PROCEDURE — 76700 US EXAM ABDOM COMPLETE: CPT

## 2020-11-21 PROCEDURE — 76700 US EXAM ABDOM COMPLETE: CPT | Mod: 26

## 2020-12-01 ENCOUNTER — APPOINTMENT (OUTPATIENT)
Dept: SURGERY | Facility: CLINIC | Age: 30
End: 2020-12-01
Payer: MEDICAID

## 2020-12-01 VITALS
BODY MASS INDEX: 43.98 KG/M2 | HEIGHT: 62 IN | HEART RATE: 93 BPM | OXYGEN SATURATION: 98 % | SYSTOLIC BLOOD PRESSURE: 130 MMHG | TEMPERATURE: 97.8 F | RESPIRATION RATE: 17 BRPM | WEIGHT: 239 LBS | DIASTOLIC BLOOD PRESSURE: 84 MMHG

## 2020-12-01 PROCEDURE — 99072 ADDL SUPL MATRL&STAF TM PHE: CPT

## 2020-12-01 PROCEDURE — 99204 OFFICE O/P NEW MOD 45 MIN: CPT

## 2020-12-01 NOTE — ASSESSMENT
[FreeTextEntry1] : I have discussed with the patient the laparoscopic removal of her gallbladder.  The patient understands that we will be using ICG dye during the procedure so that we can better define the biliary system.  The patient understands that if during the operation it is deemed unsafe that we will convert her to an open procedure.\par \par The patient understands that if the laparoscopic procedure is done that she will go home that day or possibly the next morning.  But that if an open cholecystectomy is required she will be in the hospital for 2 or 3 days.\par \par I have discussed the risks and benefits  of surgery with the patient.  The risks which include  but are not exclusive of other issues included bleeding, bowel injury, bile duct injury and the possibility of converting from an laparoscopic to open procedure due to unforeseen issues like the above or some unforeseen anatomic issues.  The conversion to an open procedure is mine at the time of the surgical procedure.

## 2020-12-01 NOTE — HISTORY OF PRESENT ILLNESS
[de-identified] : The patient is here today to discuss removal of her gallbladder.  She has a long history of known gallstones and upper abdominal discomfort for which she is had an extensive work-up all of which have been negative other than the gallstones.  On none of the imaging studies is there any indication that there is any acute inflammation so she was told she has biliary colic.  Patient denies any nausea or vomiting she states that she never noticed her eyes becoming yellow or her urine coming very dark or her skin color changing.  I reviewed the ultrasound and CAT scan images with the patient.

## 2020-12-01 NOTE — CONSULT LETTER
[Dear  ___] : Dear  [unfilled], [Consult Letter:] : I had the pleasure of evaluating your patient, [unfilled]. [Please see my note below.] : Please see my note below. [Consult Closing:] : Thank you very much for allowing me to participate in the care of this patient.  If you have any questions, please do not hesitate to contact me. [Sincerely,] : Sincerely, [FreeTextEntry3] : I have reviewed all the documentation for this encounter with the patient and have edited where appropriate\par \par Dr. Dion Calvert

## 2020-12-01 NOTE — PHYSICAL EXAM
[JVD] : no jugular venous distention  [Normal Heart Sounds] : normal heart sounds [Abdominal Masses] : No abdominal masses [Abdomen Tenderness] : ~T ~M No abdominal tenderness [No HSM] : no hepatosplenomegaly [No Rash or Lesion] : No rash or lesion [Alert] : alert [Oriented to Person] : oriented to person [Oriented to Place] : oriented to place [Oriented to Time] : oriented to time [Calm] : calm [de-identified] : Developed markedly overweight white female in no acute distress [de-identified] : Within normal limits -cranial nerves II through XII intact [de-identified] : Normal strength and gait

## 2020-12-23 PROBLEM — N39.0 ACUTE URINARY TRACT INFECTION: Status: RESOLVED | Noted: 2020-02-10 | Resolved: 2020-12-23

## 2020-12-29 ENCOUNTER — OUTPATIENT (OUTPATIENT)
Dept: OUTPATIENT SERVICES | Facility: HOSPITAL | Age: 30
LOS: 1 days | End: 2020-12-29
Payer: MEDICAID

## 2020-12-29 VITALS
HEART RATE: 90 BPM | SYSTOLIC BLOOD PRESSURE: 148 MMHG | WEIGHT: 240.97 LBS | HEIGHT: 62 IN | DIASTOLIC BLOOD PRESSURE: 84 MMHG | OXYGEN SATURATION: 100 % | RESPIRATION RATE: 16 BRPM | TEMPERATURE: 98 F

## 2020-12-29 DIAGNOSIS — Z98.891 HISTORY OF UTERINE SCAR FROM PREVIOUS SURGERY: Chronic | ICD-10-CM

## 2020-12-29 DIAGNOSIS — K80.20 CALCULUS OF GALLBLADDER WITHOUT CHOLECYSTITIS WITHOUT OBSTRUCTION: ICD-10-CM

## 2020-12-29 DIAGNOSIS — Z98.890 OTHER SPECIFIED POSTPROCEDURAL STATES: Chronic | ICD-10-CM

## 2020-12-29 LAB
ALBUMIN SERPL ELPH-MCNC: 4.4 G/DL — SIGNIFICANT CHANGE UP (ref 3.3–5)
ALP SERPL-CCNC: 72 U/L — SIGNIFICANT CHANGE UP (ref 40–120)
ALT FLD-CCNC: 24 U/L — SIGNIFICANT CHANGE UP (ref 10–45)
ANION GAP SERPL CALC-SCNC: 14 MMOL/L — SIGNIFICANT CHANGE UP (ref 5–17)
AST SERPL-CCNC: 17 U/L — SIGNIFICANT CHANGE UP (ref 10–40)
BILIRUB SERPL-MCNC: 0.1 MG/DL — LOW (ref 0.2–1.2)
BUN SERPL-MCNC: 8 MG/DL — SIGNIFICANT CHANGE UP (ref 7–23)
CALCIUM SERPL-MCNC: 9.4 MG/DL — SIGNIFICANT CHANGE UP (ref 8.4–10.5)
CHLORIDE SERPL-SCNC: 101 MMOL/L — SIGNIFICANT CHANGE UP (ref 96–108)
CO2 SERPL-SCNC: 23 MMOL/L — SIGNIFICANT CHANGE UP (ref 22–31)
CREAT SERPL-MCNC: 0.61 MG/DL — SIGNIFICANT CHANGE UP (ref 0.5–1.3)
GLUCOSE SERPL-MCNC: 79 MG/DL — SIGNIFICANT CHANGE UP (ref 70–99)
HCG SERPL-ACNC: <2 MIU/ML — SIGNIFICANT CHANGE UP
HCT VFR BLD CALC: 40.7 % — SIGNIFICANT CHANGE UP (ref 34.5–45)
HGB BLD-MCNC: 13 G/DL — SIGNIFICANT CHANGE UP (ref 11.5–15.5)
MCHC RBC-ENTMCNC: 26.3 PG — LOW (ref 27–34)
MCHC RBC-ENTMCNC: 31.9 GM/DL — LOW (ref 32–36)
MCV RBC AUTO: 82.4 FL — SIGNIFICANT CHANGE UP (ref 80–100)
NRBC # BLD: 0 /100 WBCS — SIGNIFICANT CHANGE UP (ref 0–0)
PLATELET # BLD AUTO: 210 K/UL — SIGNIFICANT CHANGE UP (ref 150–400)
POTASSIUM SERPL-MCNC: 4.2 MMOL/L — SIGNIFICANT CHANGE UP (ref 3.5–5.3)
POTASSIUM SERPL-SCNC: 4.2 MMOL/L — SIGNIFICANT CHANGE UP (ref 3.5–5.3)
PROT SERPL-MCNC: 7.9 G/DL — SIGNIFICANT CHANGE UP (ref 6–8.3)
RBC # BLD: 4.94 M/UL — SIGNIFICANT CHANGE UP (ref 3.8–5.2)
RBC # FLD: 14.5 % — SIGNIFICANT CHANGE UP (ref 10.3–14.5)
SODIUM SERPL-SCNC: 138 MMOL/L — SIGNIFICANT CHANGE UP (ref 135–145)
WBC # BLD: 8.06 K/UL — SIGNIFICANT CHANGE UP (ref 3.8–10.5)
WBC # FLD AUTO: 8.06 K/UL — SIGNIFICANT CHANGE UP (ref 3.8–10.5)

## 2020-12-29 PROCEDURE — 84702 CHORIONIC GONADOTROPIN TEST: CPT

## 2020-12-29 PROCEDURE — G0463: CPT

## 2020-12-29 PROCEDURE — 85027 COMPLETE CBC AUTOMATED: CPT

## 2020-12-29 PROCEDURE — 80053 COMPREHEN METABOLIC PANEL: CPT

## 2020-12-29 RX ORDER — CEFAZOLIN SODIUM 1 G
2000 VIAL (EA) INJECTION ONCE
Refills: 0 | Status: DISCONTINUED | OUTPATIENT
Start: 2021-01-08 | End: 2021-01-22

## 2020-12-29 NOTE — H&P PST ADULT - NSANTHOSAYNRD_GEN_A_CORE
No. ASHLYN screening performed.  STOP BANG Legend: 0-2 = LOW Risk; 3-4 = INTERMEDIATE Risk; 5-8 = HIGH Risk

## 2020-12-29 NOTE — H&P PST ADULT - NSICDXPROBLEM_GEN_ALL_CORE_FT
PROBLEM DIAGNOSES  Problem: Cholelithiases  Assessment and Plan: Laparoscopic Cholecystectomy on 1/8/20  Pre-op instructins, including Chlorhexidine soap, provided - all questions answered  Labs done at [facility]

## 2020-12-29 NOTE — H&P PST ADULT - NSICDXPASTMEDICALHX_GEN_ALL_CORE_FT
PAST MEDICAL HISTORY:  Herniated disc, cervical pt has range of motion without difficulties    Lumbar back pain chronic, on and off     PAST MEDICAL HISTORY:  Cholelithiases     Herniated disc, cervical pt has range of motion without difficulties    Lumbar back pain chronic, on and off    Morbid obesity BMI 44.1 today, 12/29/20

## 2020-12-29 NOTE — H&P PST ADULT - ACTIVITY
climbs 2 flights of stairs several times a day, can walk 3-4 blocks climbs 2 flights of stairs several times a day at home, can walk 3-4 blocks

## 2020-12-29 NOTE — H&P PST ADULT - HISTORY OF PRESENT ILLNESS
2018  after eaiting,  29 yo female, PMH morbid obesity (BMI 44.1 today), reports GERD, intermittent chest and abdominal pains since the birth of her child in 2018 - cardiac work-up was negative, u/s revealed gallstone but surgery had not been recommended at that time, abdominal pain now becoming more acute and often - no correlation to eating anymore, presents to [facility] for scheduled Laparoscopic Cholecystectomy on 1/8/21. Pt. denies COVID-19 infection this year, denies close contact with anyone that has been ill, no fever, cough, dyspnea in past two weeks.    Pt. is scheduled for COVID-19 testing on 1/5/20 at [facility]

## 2020-12-31 PROBLEM — M54.5 LOW BACK PAIN: Chronic | Status: ACTIVE | Noted: 2017-12-23

## 2020-12-31 PROBLEM — K80.20 CALCULUS OF GALLBLADDER WITHOUT CHOLECYSTITIS WITHOUT OBSTRUCTION: Chronic | Status: ACTIVE | Noted: 2020-12-29

## 2020-12-31 PROBLEM — M50.20 OTHER CERVICAL DISC DISPLACEMENT, UNSPECIFIED CERVICAL REGION: Chronic | Status: ACTIVE | Noted: 2017-12-23

## 2021-01-05 ENCOUNTER — OUTPATIENT (OUTPATIENT)
Dept: OUTPATIENT SERVICES | Facility: HOSPITAL | Age: 31
LOS: 1 days | End: 2021-01-05
Payer: MEDICAID

## 2021-01-05 DIAGNOSIS — Z20.828 CONTACT WITH AND (SUSPECTED) EXPOSURE TO OTHER VIRAL COMMUNICABLE DISEASES: ICD-10-CM

## 2021-01-05 DIAGNOSIS — Z98.890 OTHER SPECIFIED POSTPROCEDURAL STATES: Chronic | ICD-10-CM

## 2021-01-05 DIAGNOSIS — Z98.891 HISTORY OF UTERINE SCAR FROM PREVIOUS SURGERY: Chronic | ICD-10-CM

## 2021-01-05 LAB — SARS-COV-2 RNA SPEC QL NAA+PROBE: SIGNIFICANT CHANGE UP

## 2021-01-05 PROCEDURE — C9803: CPT

## 2021-01-05 PROCEDURE — U0003: CPT

## 2021-01-05 PROCEDURE — U0005: CPT

## 2021-01-07 ENCOUNTER — TRANSCRIPTION ENCOUNTER (OUTPATIENT)
Age: 31
End: 2021-01-07

## 2021-01-08 ENCOUNTER — RESULT REVIEW (OUTPATIENT)
Age: 31
End: 2021-01-08

## 2021-01-08 ENCOUNTER — OUTPATIENT (OUTPATIENT)
Dept: OUTPATIENT SERVICES | Facility: HOSPITAL | Age: 31
LOS: 1 days | End: 2021-01-08
Payer: MEDICAID

## 2021-01-08 ENCOUNTER — APPOINTMENT (OUTPATIENT)
Dept: SURGERY | Facility: HOSPITAL | Age: 31
End: 2021-01-08
Payer: MEDICAID

## 2021-01-08 VITALS
DIASTOLIC BLOOD PRESSURE: 88 MMHG | OXYGEN SATURATION: 100 % | HEART RATE: 95 BPM | RESPIRATION RATE: 18 BRPM | WEIGHT: 240.97 LBS | HEIGHT: 61.97 IN | TEMPERATURE: 98 F | SYSTOLIC BLOOD PRESSURE: 126 MMHG

## 2021-01-08 VITALS
SYSTOLIC BLOOD PRESSURE: 118 MMHG | DIASTOLIC BLOOD PRESSURE: 65 MMHG | OXYGEN SATURATION: 97 % | RESPIRATION RATE: 14 BRPM

## 2021-01-08 DIAGNOSIS — Z98.891 HISTORY OF UTERINE SCAR FROM PREVIOUS SURGERY: Chronic | ICD-10-CM

## 2021-01-08 DIAGNOSIS — Z98.890 OTHER SPECIFIED POSTPROCEDURAL STATES: Chronic | ICD-10-CM

## 2021-01-08 DIAGNOSIS — K80.20 CALCULUS OF GALLBLADDER WITHOUT CHOLECYSTITIS WITHOUT OBSTRUCTION: ICD-10-CM

## 2021-01-08 LAB — HCG UR QL: NEGATIVE — SIGNIFICANT CHANGE UP

## 2021-01-08 PROCEDURE — C1889: CPT

## 2021-01-08 PROCEDURE — 81025 URINE PREGNANCY TEST: CPT

## 2021-01-08 PROCEDURE — 88304 TISSUE EXAM BY PATHOLOGIST: CPT | Mod: 26

## 2021-01-08 PROCEDURE — 47563 LAPARO CHOLECYSTECTOMY/GRAPH: CPT

## 2021-01-08 PROCEDURE — 88304 TISSUE EXAM BY PATHOLOGIST: CPT

## 2021-01-08 PROCEDURE — C9399: CPT

## 2021-01-08 RX ORDER — OXYCODONE HYDROCHLORIDE 5 MG/1
1 TABLET ORAL
Qty: 5 | Refills: 0
Start: 2021-01-08

## 2021-01-08 RX ORDER — IBUPROFEN 200 MG
1 TABLET ORAL
Qty: 0 | Refills: 0 | DISCHARGE
Start: 2021-01-08

## 2021-01-08 RX ORDER — ONDANSETRON 8 MG/1
4 TABLET, FILM COATED ORAL ONCE
Refills: 0 | Status: DISCONTINUED | OUTPATIENT
Start: 2021-01-08 | End: 2021-01-08

## 2021-01-08 RX ORDER — HYDROMORPHONE HYDROCHLORIDE 2 MG/ML
0.25 INJECTION INTRAMUSCULAR; INTRAVENOUS; SUBCUTANEOUS
Refills: 0 | Status: DISCONTINUED | OUTPATIENT
Start: 2021-01-08 | End: 2021-01-08

## 2021-01-08 RX ORDER — LIDOCAINE HCL 20 MG/ML
0.2 VIAL (ML) INJECTION ONCE
Refills: 0 | Status: DISCONTINUED | OUTPATIENT
Start: 2021-01-08 | End: 2021-01-08

## 2021-01-08 RX ORDER — ACETAMINOPHEN 500 MG
975 TABLET ORAL EVERY 6 HOURS
Refills: 0 | Status: DISCONTINUED | OUTPATIENT
Start: 2021-01-08 | End: 2021-01-22

## 2021-01-08 RX ORDER — IBUPROFEN 200 MG
600 TABLET ORAL EVERY 6 HOURS
Refills: 0 | Status: DISCONTINUED | OUTPATIENT
Start: 2021-01-08 | End: 2021-01-22

## 2021-01-08 RX ORDER — OXYCODONE HYDROCHLORIDE 5 MG/1
5 TABLET ORAL EVERY 4 HOURS
Refills: 0 | Status: DISCONTINUED | OUTPATIENT
Start: 2021-01-08 | End: 2021-01-08

## 2021-01-08 RX ORDER — SODIUM CHLORIDE 9 MG/ML
1000 INJECTION, SOLUTION INTRAVENOUS
Refills: 0 | Status: DISCONTINUED | OUTPATIENT
Start: 2021-01-08 | End: 2021-01-22

## 2021-01-08 RX ORDER — ACETAMINOPHEN 500 MG
3 TABLET ORAL
Qty: 0 | Refills: 0 | DISCHARGE
Start: 2021-01-08

## 2021-01-08 RX ORDER — SODIUM CHLORIDE 9 MG/ML
3 INJECTION INTRAMUSCULAR; INTRAVENOUS; SUBCUTANEOUS EVERY 8 HOURS
Refills: 0 | Status: DISCONTINUED | OUTPATIENT
Start: 2021-01-08 | End: 2021-01-08

## 2021-01-08 NOTE — ASU DISCHARGE PLAN (ADULT/PEDIATRIC) - CALL YOUR DOCTOR IF YOU HAVE ANY OF THE FOLLOWING:
PAGER ID: [phone]   MESSAGE: Bjorn in 201 is on tele coming up from icu but does not have an order. Do you want the patient on tele? Cheikh [phone]     Pain not relieved by Medications/Fever greater than (need to indicate Fahrenheit or Celsius)/Wound/Surgical Site with redness, or foul smelling discharge or pus/Nausea and vomiting that does not stop/Inability to tolerate liquids or foods

## 2021-01-08 NOTE — ASU PATIENT PROFILE, ADULT - PMH
Cholelithiases    Herniated disc, cervical  pt has range of motion without difficulties  Lumbar back pain  chronic, on and off  Morbid obesity  BMI 44.1 today, 12/29/20

## 2021-01-08 NOTE — ASU DISCHARGE PLAN (ADULT/PEDIATRIC) - CARE PROVIDER_API CALL
Dion Calvert  SURGERY  [address] [place] [address], [facility] [address]  [place] [address]  Phone: [phone]  Fax: [phone]  Follow Up Time:

## 2021-01-08 NOTE — ASU DISCHARGE PLAN (ADULT/PEDIATRIC) - ASU DC SPECIAL INSTRUCTIONSFT
Please apply ice packs to the incisions to reduce pain and swelling.     Please call to schedule a follow up appointment in 10-14 days.     Please take the tylenol and motrin (acetaminophen and ibuprofen) around the clock for the first 2 days. Take the oxycodone only if your pain is not controlled with tylenol and motrin.

## 2021-01-08 NOTE — BRIEF OPERATIVE NOTE - OPERATION/FINDINGS
access with veress needle at supraumbilical site. 11mm step trochar. 3 additional 5mm ports placed in upper abdomen.     critical view obtained. cystic artery and duct clipped with 5mm hemolock clips. additional posterior branch of cystic artery also clipped with 5mm hemolock clips. gallbladder taken off liver bed without bleeding or spillage. umbilical incision enlarged to remove gallbladder with large stones. Closed with two figure of 8 sutures and a simple.

## 2021-01-15 LAB — SURGICAL PATHOLOGY STUDY: SIGNIFICANT CHANGE UP

## 2021-01-19 ENCOUNTER — APPOINTMENT (OUTPATIENT)
Dept: SURGERY | Facility: CLINIC | Age: 31
End: 2021-01-19
Payer: MEDICAID

## 2021-01-19 VITALS
TEMPERATURE: 97.9 F | OXYGEN SATURATION: 100 % | HEART RATE: 87 BPM | SYSTOLIC BLOOD PRESSURE: 115 MMHG | RESPIRATION RATE: 16 BRPM | DIASTOLIC BLOOD PRESSURE: 76 MMHG

## 2021-01-19 DIAGNOSIS — K80.20 CALCULUS OF GALLBLADDER W/OUT CHOLECYSTITIS W/OUT OBSTRUCTION: ICD-10-CM

## 2021-01-19 PROCEDURE — 99024 POSTOP FOLLOW-UP VISIT: CPT

## 2021-01-19 RX ORDER — OMEPRAZOLE 40 MG/1
40 CAPSULE, DELAYED RELEASE ORAL
Qty: 1 | Refills: 3 | Status: DISCONTINUED | COMMUNITY
Start: 2019-11-07 | End: 2021-01-19

## 2021-01-19 NOTE — HISTORY OF PRESENT ILLNESS
[de-identified] : Celestino is a 30 year old female s/p laparoscopic cholecystectomy on 1/8/21. Pathology demonstrates chronic cholecystitis with focal intestinal metaplasia and cholelithiasis. Negative for dysplasia. In the office today the patient is still having persistent loose BMs. After eating, feels urgent need to defecate. About 5 loose BMs daily. Denies liquid diarrhea. Denies incontinence. Denies abdominal pain, nausea/vomiting.

## 2021-01-19 NOTE — PHYSICAL EXAM
[Abdominal Masses] : No abdominal masses [Abdomen Tenderness] : ~T ~M No abdominal tenderness [No HSM] : no hepatosplenomegaly [de-identified] : Bowel sounds hyper active

## 2021-01-22 ENCOUNTER — APPOINTMENT (OUTPATIENT)
Dept: INTERNAL MEDICINE | Facility: CLINIC | Age: 31
End: 2021-01-22
Payer: MEDICAID

## 2021-01-22 VITALS
WEIGHT: 242 LBS | BODY MASS INDEX: 44.53 KG/M2 | DIASTOLIC BLOOD PRESSURE: 76 MMHG | HEART RATE: 90 BPM | TEMPERATURE: 98.6 F | HEIGHT: 62 IN | SYSTOLIC BLOOD PRESSURE: 118 MMHG | OXYGEN SATURATION: 97 %

## 2021-01-22 DIAGNOSIS — R19.7 DIARRHEA, UNSPECIFIED: ICD-10-CM

## 2021-01-22 PROCEDURE — 99072 ADDL SUPL MATRL&STAF TM PHE: CPT

## 2021-01-22 PROCEDURE — 99214 OFFICE O/P EST MOD 30 MIN: CPT

## 2021-01-22 NOTE — PHYSICAL EXAM
[Normal] : normal rate, regular rhythm, normal S1 and S2 and no murmur heard [de-identified] : low back pain and positive straight leg raising [de-identified] : hyperpigment rash mid back

## 2021-01-22 NOTE — HISTORY OF PRESENT ILLNESS
[FreeTextEntry1] : diarrhea post gb [de-identified] : diarhea post gb\par surgeon started imodium tid with some relief\par unclear if antibiotics\par \par rash lower back\par history of lumbar issue\par pan both legs and back

## 2021-01-22 NOTE — PLAN
[FreeTextEntry1] : diarrhea post gb\par stool for c dificil\par cholestyramine powder\par \par leg pain with history of lumbar herniated discks\par trial of naproxen 500 bid\par \par rash mid back eczema\par trial of triamcinolone if not better derm

## 2021-01-22 NOTE — REVIEW OF SYSTEMS
[Melena] : no melena [Muscle Pain] : muscle pain [Back Pain] : back pain [Itching] : Itching [Skin Rash] : skin rash [Negative] : Respiratory

## 2021-02-04 ENCOUNTER — APPOINTMENT (OUTPATIENT)
Dept: SURGERY | Facility: CLINIC | Age: 31
End: 2021-02-04
Payer: MEDICAID

## 2021-02-04 PROCEDURE — 99024 POSTOP FOLLOW-UP VISIT: CPT

## 2021-02-04 NOTE — HISTORY OF PRESENT ILLNESS
[Home] : at home, [unfilled] , at the time of the visit. [Medical Office: ([facility])___] : at the medical office located in  [Verbal consent obtained from patient] : the patient, [unfilled] [de-identified] : Celestino is a 30 year old female s/p laparoscopic cholecystectomy on 1/8/21. Pathology demonstrates chronic cholecystitis with focal intestinal metaplasia and cholelithiasis. Negative for dysplasia. Last seen on 1/19/21 with persistent loose BMs. After eating, feels urgent need to defecate. About 5 loose BMs daily. Denies liquid diarrhea. Denies incontinence. Denies abdominal pain, nausea/vomiting.  The patient states that the frequent bowel movements have been getting better.  She is having formed stools but may be 2 or 3 a day she intermittently takes Imodium which one time made her constipated\par

## 2021-02-04 NOTE — ASSESSMENT
[FreeTextEntry1] : The patient that I will see her at any time if there is ongoing issues with his diarrhea and that there is a drug we could use called Questran which binds bile salts but I do not think that it is indicated for her at this time.

## 2021-04-18 NOTE — ASSESSMENT
[FreeTextEntry1] : Have told the patient that I will give her a trial of Imodium 3 times a day.  Sent a prescription to the pharmacy.  He is to return in 1 week
Limited mobility/muscle strength/ROM

## 2021-05-13 NOTE — ED PROVIDER NOTE - NEUROLOGICAL, MLM
please, repeat BMP prior to OR
Decreased Platelets #
Alert and oriented, no focal deficits, no motor or sensory deficits.

## 2021-05-24 NOTE — BRIEF OPERATIVE NOTE - NSICDXBRIEFPROCEDURE_GEN_ALL_CORE_FT
PROCEDURES:  Laparoscopic cholecystectomy using multiple ports 08-Jan-2021 09:52:39  Gretchen Negro   1 pair

## 2021-06-09 ENCOUNTER — LABORATORY RESULT (OUTPATIENT)
Age: 31
End: 2021-06-09

## 2021-06-09 ENCOUNTER — OUTPATIENT (OUTPATIENT)
Dept: OUTPATIENT SERVICES | Facility: HOSPITAL | Age: 31
LOS: 1 days | End: 2021-06-09
Payer: MEDICAID

## 2021-06-09 ENCOUNTER — RESULT REVIEW (OUTPATIENT)
Age: 31
End: 2021-06-09

## 2021-06-09 ENCOUNTER — APPOINTMENT (OUTPATIENT)
Dept: OBGYN | Facility: CLINIC | Age: 31
End: 2021-06-09
Payer: MEDICAID

## 2021-06-09 VITALS — TEMPERATURE: 96.9 F

## 2021-06-09 VITALS
SYSTOLIC BLOOD PRESSURE: 120 MMHG | BODY MASS INDEX: 44.83 KG/M2 | DIASTOLIC BLOOD PRESSURE: 78 MMHG | WEIGHT: 245.13 LBS

## 2021-06-09 DIAGNOSIS — N91.1 SECONDARY AMENORRHEA: ICD-10-CM

## 2021-06-09 DIAGNOSIS — Z01.419 ENCOUNTER FOR GYNECOLOGICAL EXAMINATION (GENERAL) (ROUTINE) W/OUT ABNORMAL FINDINGS: ICD-10-CM

## 2021-06-09 DIAGNOSIS — Z98.890 OTHER SPECIFIED POSTPROCEDURAL STATES: Chronic | ICD-10-CM

## 2021-06-09 DIAGNOSIS — Z01.419 ENCOUNTER FOR GYNECOLOGICAL EXAMINATION (GENERAL) (ROUTINE) WITHOUT ABNORMAL FINDINGS: ICD-10-CM

## 2021-06-09 DIAGNOSIS — Z98.891 HISTORY OF UTERINE SCAR FROM PREVIOUS SURGERY: Chronic | ICD-10-CM

## 2021-06-09 DIAGNOSIS — Z11.3 ENCOUNTER FOR SCREENING FOR INFECTIONS WITH A PREDOMINANTLY SEXUAL MODE OF TRANSMISSION: ICD-10-CM

## 2021-06-09 DIAGNOSIS — R10.32 LEFT LOWER QUADRANT PAIN: ICD-10-CM

## 2021-06-09 DIAGNOSIS — N76.0 ACUTE VAGINITIS: ICD-10-CM

## 2021-06-09 PROCEDURE — 86780 TREPONEMA PALLIDUM: CPT

## 2021-06-09 PROCEDURE — G0463: CPT | Mod: 25

## 2021-06-09 PROCEDURE — 81025 URINE PREGNANCY TEST: CPT

## 2021-06-09 PROCEDURE — 87389 HIV-1 AG W/HIV-1&-2 AB AG IA: CPT

## 2021-06-09 PROCEDURE — 87591 N.GONORRHOEAE DNA AMP PROB: CPT

## 2021-06-09 PROCEDURE — 84443 ASSAY THYROID STIM HORMONE: CPT

## 2021-06-09 PROCEDURE — 83001 ASSAY OF GONADOTROPIN (FSH): CPT

## 2021-06-09 PROCEDURE — 99395 PREV VISIT EST AGE 18-39: CPT

## 2021-06-09 PROCEDURE — 84146 ASSAY OF PROLACTIN: CPT

## 2021-06-09 PROCEDURE — 88175 CYTOPATH C/V AUTO FLUID REDO: CPT

## 2021-06-09 PROCEDURE — 87491 CHLMYD TRACH DNA AMP PROBE: CPT

## 2021-06-09 NOTE — PHYSICAL EXAM
[Awake] : awake [Alert] : alert [Soft] : soft [LLQ] : in the left lower quadrant [Oriented x3] : oriented to person, place, and time [Normal] : uterus [No Bleeding] : there was no active vaginal bleeding [Uterine Adnexae] : were not tender and not enlarged [RRR, No Murmurs] : RRR, no murmurs [CTAB] : CTAB [Acute Distress] : no acute distress [Mass] : no breast mass [Tender] : no tenderness [Nipple Discharge] : no nipple discharge [Axillary LAD] : no axillary lymphadenopathy

## 2021-06-09 NOTE — HISTORY OF PRESENT ILLNESS
[Reproductive Age] : is of reproductive age [Menstrual Problems] : reports abnormal menses [Pregnancy History] : pregnancy history: [Total Preg ___] : [unfilled] [Full Term ___] : [unfilled] [Premature ___] : [unfilled] [Abortions ___] : [unfilled] [Living ___] : [unfilled] [AB Induced ___] : elective abortions: [unfilled] [Monogamous (Male Partner)] : is monogamous with a male partner [Up to Date] : up to date with ~his/her~ immunizations [HPV Vaccine ___] : HPV vaccine [unfilled] [Lower-Lt-Q] : left lower quadrant [Sharp] : sharp [Activity] : worsened by activity [Menses] : worsened by menses [Irregular Menses] : irregular menses [Pain] : pelvic pain [Definite:  ___ (Date)] : the last menstrual period was [unfilled] [Menstrual Cramps] : menstrual cramps [Sexually Active] : is sexually active [Monogamous] : is monogamous [Male ___] : [unfilled] male [Yes] : yes [Contraception] : does not use contraception [Nausea] : no nausea [Vomiting] : no vomiting [Vaginal Bleeding] : no vaginal bleeding [Dysuria] : no dysuria [Spotting Between  Menses] : no spotting between menses [Regular Cycle Intervals] : periods have been irregular

## 2021-06-10 LAB
C TRACH RRNA SPEC QL NAA+PROBE: SIGNIFICANT CHANGE UP
FSH SERPL-MCNC: 5.8 IU/L — SIGNIFICANT CHANGE UP
HIV 1+2 AB+HIV1 P24 AG SERPL QL IA: SIGNIFICANT CHANGE UP
N GONORRHOEA RRNA SPEC QL NAA+PROBE: SIGNIFICANT CHANGE UP
PROLACTIN SERPL-MCNC: 9.8 NG/ML — SIGNIFICANT CHANGE UP (ref 3.4–24.1)
SPECIMEN SOURCE: SIGNIFICANT CHANGE UP
T PALLIDUM AB TITR SER: NEGATIVE — SIGNIFICANT CHANGE UP
T4 FREE+ TSH PNL SERPL: 1.37 UIU/ML — SIGNIFICANT CHANGE UP (ref 0.27–4.2)

## 2021-06-13 LAB — CYTOLOGY SPEC DOC CYTO: SIGNIFICANT CHANGE UP

## 2021-07-03 ENCOUNTER — OUTPATIENT (OUTPATIENT)
Dept: OUTPATIENT SERVICES | Facility: HOSPITAL | Age: 31
LOS: 1 days | End: 2021-07-03
Payer: MEDICAID

## 2021-07-03 ENCOUNTER — APPOINTMENT (OUTPATIENT)
Dept: ULTRASOUND IMAGING | Facility: IMAGING CENTER | Age: 31
End: 2021-07-03
Payer: MEDICAID

## 2021-07-03 DIAGNOSIS — R10.32 LEFT LOWER QUADRANT PAIN: ICD-10-CM

## 2021-07-03 DIAGNOSIS — Z98.891 HISTORY OF UTERINE SCAR FROM PREVIOUS SURGERY: Chronic | ICD-10-CM

## 2021-07-03 DIAGNOSIS — Z98.890 OTHER SPECIFIED POSTPROCEDURAL STATES: Chronic | ICD-10-CM

## 2021-07-03 PROCEDURE — 76830 TRANSVAGINAL US NON-OB: CPT | Mod: 26

## 2021-07-03 PROCEDURE — 76830 TRANSVAGINAL US NON-OB: CPT

## 2021-07-07 ENCOUNTER — APPOINTMENT (OUTPATIENT)
Dept: INTERNAL MEDICINE | Facility: CLINIC | Age: 31
End: 2021-07-07
Payer: MEDICAID

## 2021-07-07 ENCOUNTER — NON-APPOINTMENT (OUTPATIENT)
Age: 31
End: 2021-07-07

## 2021-07-07 VITALS
HEIGHT: 62 IN | HEART RATE: 79 BPM | BODY MASS INDEX: 44.53 KG/M2 | OXYGEN SATURATION: 98 % | DIASTOLIC BLOOD PRESSURE: 77 MMHG | TEMPERATURE: 98.4 F | WEIGHT: 242 LBS | SYSTOLIC BLOOD PRESSURE: 118 MMHG

## 2021-07-07 DIAGNOSIS — Z23 ENCOUNTER FOR IMMUNIZATION: ICD-10-CM

## 2021-07-07 DIAGNOSIS — M54.9 DORSALGIA, UNSPECIFIED: ICD-10-CM

## 2021-07-07 DIAGNOSIS — F41.8 OTHER SPECIFIED ANXIETY DISORDERS: ICD-10-CM

## 2021-07-07 PROCEDURE — 99395 PREV VISIT EST AGE 18-39: CPT | Mod: 25

## 2021-07-07 RX ORDER — NAPROXEN 500 MG/1
500 TABLET ORAL
Qty: 60 | Refills: 1 | Status: DISCONTINUED | COMMUNITY
Start: 2021-01-22 | End: 2021-07-07

## 2021-07-07 RX ORDER — MEDROXYPROGESTERONE ACETATE 10 MG/1
10 TABLET ORAL DAILY
Qty: 10 | Refills: 0 | Status: DISCONTINUED | COMMUNITY
Start: 2021-06-09 | End: 2021-07-07

## 2021-07-07 RX ORDER — CHOLESTYRAMINE 4 G/9G
4 POWDER, FOR SUSPENSION ORAL DAILY
Qty: 1 | Refills: 0 | Status: DISCONTINUED | COMMUNITY
Start: 2021-01-22 | End: 2021-07-07

## 2021-07-07 RX ORDER — TRIAMCINOLONE ACETONIDE 1 MG/G
0.1 CREAM TOPICAL TWICE DAILY
Qty: 1 | Refills: 3 | Status: DISCONTINUED | COMMUNITY
Start: 2021-01-22 | End: 2021-07-07

## 2021-07-08 LAB
25(OH)D3 SERPL-MCNC: 19 NG/ML
ALBUMIN SERPL ELPH-MCNC: 4.4 G/DL
ALP BLD-CCNC: 84 U/L
ALT SERPL-CCNC: 31 U/L
ANION GAP SERPL CALC-SCNC: 14 MMOL/L
AST SERPL-CCNC: 18 U/L
BASOPHILS # BLD AUTO: 0.01 K/UL
BASOPHILS NFR BLD AUTO: 0.2 %
BILIRUB SERPL-MCNC: 0.3 MG/DL
BUN SERPL-MCNC: 10 MG/DL
CALCIUM SERPL-MCNC: 9.1 MG/DL
CHLORIDE SERPL-SCNC: 103 MMOL/L
CHOLEST SERPL-MCNC: 118 MG/DL
CO2 SERPL-SCNC: 22 MMOL/L
CREAT SERPL-MCNC: 0.7 MG/DL
EOSINOPHIL # BLD AUTO: 0.06 K/UL
EOSINOPHIL NFR BLD AUTO: 1 %
ESTIMATED AVERAGE GLUCOSE: 117 MG/DL
GLUCOSE SERPL-MCNC: 92 MG/DL
HBA1C MFR BLD HPLC: 5.7 %
HCT VFR BLD CALC: 38.5 %
HDLC SERPL-MCNC: 24 MG/DL
HGB BLD-MCNC: 12.1 G/DL
IMM GRANULOCYTES NFR BLD AUTO: 0.3 %
LDLC SERPL CALC-MCNC: 63 MG/DL
LYMPHOCYTES # BLD AUTO: 1.93 K/UL
LYMPHOCYTES NFR BLD AUTO: 30.7 %
MAN DIFF?: NORMAL
MCHC RBC-ENTMCNC: 27.3 PG
MCHC RBC-ENTMCNC: 31.4 GM/DL
MCV RBC AUTO: 86.7 FL
MONOCYTES # BLD AUTO: 0.34 K/UL
MONOCYTES NFR BLD AUTO: 5.4 %
NEUTROPHILS # BLD AUTO: 3.92 K/UL
NEUTROPHILS NFR BLD AUTO: 62.4 %
NONHDLC SERPL-MCNC: 95 MG/DL
PLATELET # BLD AUTO: 189 K/UL
POTASSIUM SERPL-SCNC: 3.8 MMOL/L
PROT SERPL-MCNC: 6.9 G/DL
RBC # BLD: 4.44 M/UL
RBC # FLD: 14 %
SODIUM SERPL-SCNC: 139 MMOL/L
T4 FREE SERPL-MCNC: 1.3 NG/DL
TRIGL SERPL-MCNC: 159 MG/DL
TSH SERPL-ACNC: 1.11 UIU/ML
WBC # FLD AUTO: 6.28 K/UL

## 2021-07-12 ENCOUNTER — NON-APPOINTMENT (OUTPATIENT)
Age: 31
End: 2021-07-12

## 2021-07-14 ENCOUNTER — NON-APPOINTMENT (OUTPATIENT)
Age: 31
End: 2021-07-14

## 2021-07-15 ENCOUNTER — NON-APPOINTMENT (OUTPATIENT)
Age: 31
End: 2021-07-15

## 2021-07-20 ENCOUNTER — NON-APPOINTMENT (OUTPATIENT)
Age: 31
End: 2021-07-20

## 2021-07-22 ENCOUNTER — NON-APPOINTMENT (OUTPATIENT)
Age: 31
End: 2021-07-22

## 2021-07-27 ENCOUNTER — APPOINTMENT (OUTPATIENT)
Dept: OBGYN | Facility: CLINIC | Age: 31
End: 2021-07-27

## 2021-09-02 ENCOUNTER — APPOINTMENT (OUTPATIENT)
Dept: INTERNAL MEDICINE | Facility: CLINIC | Age: 31
End: 2021-09-02

## 2021-09-15 ENCOUNTER — APPOINTMENT (OUTPATIENT)
Dept: INTERNAL MEDICINE | Facility: CLINIC | Age: 31
End: 2021-09-15
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
## 2022-01-10 ENCOUNTER — NON-APPOINTMENT (OUTPATIENT)
Age: 32
End: 2022-01-10

## 2022-01-10 ENCOUNTER — APPOINTMENT (OUTPATIENT)
Dept: INTERNAL MEDICINE | Facility: CLINIC | Age: 32
End: 2022-01-10
Payer: MEDICAID

## 2022-01-10 DIAGNOSIS — U07.1 COVID-19: ICD-10-CM

## 2022-01-10 PROCEDURE — 99213 OFFICE O/P EST LOW 20 MIN: CPT | Mod: 95

## 2022-01-10 NOTE — HISTORY OF PRESENT ILLNESS
[FreeTextEntry1] : covid [de-identified] : Covid\par not vaccinated\par sick 12/30\par tested positive 12/31\par fever nausea\par diarrhea\par light headed\par

## 2022-01-10 NOTE — PLAN
[FreeTextEntry1] : Increase hydration\par zofran for nausea\par imodium for diarrhea\par trial of decardon\par call n 48 houis\par Tylenol/advil prn

## 2022-03-09 NOTE — ED ADULT NURSE NOTE - CHIEF COMPLAINT QUOTE
"PRIMAY DIAGNOSIS:  TYPE 2 DIABETES MELLITUS WITH HYPERGLYCEMIA    SECONDARY DIAGNOSIS:  DYSPHAGIA    REASON FOR REFERRAL: DIFFICULTY WITH SWALLOWING AND WORD FINDING    SUBJECTIVE: PT. IS DRESSED AND READY FOR ST VISIT.  PT. STATES HER PRIMARY ST PROBLEMS ARE DIFFICUTLY WITH SWALLOWING PILLS AND BEING ABLE TO \"GET OUT WHAT I WANT TO SAY.\"  PT. STATES SHE WAS SCHEDULED FOR ESOPHAGEAL DILITAION APPROX 6 MO AGO AND AT TIME OF SCHEDULED PROCEDURE, PT. HAD AFIB AND WAS HOSPITALIZED.  PT. HAS NOT RESCHEDULED PROCEDURE.      PREVIOUS ST: NONE    INSURANCE CHANGES: NO    MEDICATION CHANGES: NO    FALL REPORTED: NONE    PATIENT STATED GOAL:    MEDICAL NECESSITY: SKILLED ST NECESSARY TO MAXIMIZE SAFETY WITH TAKING PILLS TO REDUCE RISK OF ASPIRATION AND TO MAXIMIZE RELIABLE VERBAL COMMUNICATION OF WANTS, NEEDS, THOUGHTS, PAIN AND SAFETY.    NEXT DOCTOR APPOINTMENT: UNKNOWN    REHAB POTENTIAL:  GOOD    DISCHARGE PLAN: D/C WHEN GOALS ARE MET OR WHEN MAX GAIN IS ACHIEVED.    FREQUENCY AND DURATION: 2WK2 BEGINNING WK OF 3/14/22    PLAN FOR NEXT VISIT: DYSPHAGIA TREATMENT, FOLLOW UP ON USE AND UNDERSTANDING OF SWALLOWING PRECAUTIONS/STRATEGIES; APHASIA TREATMENT TASKS; PROVIDE HEP; TRAINING AND INSTRUCTION AS NECESSARY USING TEACH BACK" pt arrives w/ c/o abdominal cramping and vaginal spotting. pt states pregnant not sure how far along. pt states lmp 12/12/17. pt also c/o nausea and vomiting. pt no pmh.

## 2022-06-08 ENCOUNTER — APPOINTMENT (OUTPATIENT)
Dept: OBGYN | Facility: CLINIC | Age: 32
End: 2022-06-08

## 2022-06-16 ENCOUNTER — APPOINTMENT (OUTPATIENT)
Dept: INTERNAL MEDICINE | Facility: CLINIC | Age: 32
End: 2022-06-16
Payer: MEDICAID

## 2022-06-16 ENCOUNTER — NON-APPOINTMENT (OUTPATIENT)
Age: 32
End: 2022-06-16

## 2022-06-16 VITALS
OXYGEN SATURATION: 97 % | WEIGHT: 252 LBS | HEIGHT: 62 IN | RESPIRATION RATE: 16 BRPM | DIASTOLIC BLOOD PRESSURE: 78 MMHG | SYSTOLIC BLOOD PRESSURE: 112 MMHG | TEMPERATURE: 98.1 F | HEART RATE: 93 BPM | BODY MASS INDEX: 46.38 KG/M2

## 2022-06-16 DIAGNOSIS — R21 RASH AND OTHER NONSPECIFIC SKIN ERUPTION: ICD-10-CM

## 2022-06-16 DIAGNOSIS — F32.A ANXIETY DISORDER, UNSPECIFIED: ICD-10-CM

## 2022-06-16 DIAGNOSIS — F41.9 ANXIETY DISORDER, UNSPECIFIED: ICD-10-CM

## 2022-06-16 DIAGNOSIS — M94.0 CHONDROCOSTAL JUNCTION SYNDROME [TIETZE]: ICD-10-CM

## 2022-06-16 PROCEDURE — 99214 OFFICE O/P EST MOD 30 MIN: CPT | Mod: 25

## 2022-06-16 PROCEDURE — 93000 ELECTROCARDIOGRAM COMPLETE: CPT

## 2022-06-16 RX ORDER — ESCITALOPRAM OXALATE 5 MG/1
5 TABLET ORAL
Qty: 45 | Refills: 0 | Status: DISCONTINUED | COMMUNITY
Start: 2021-07-07 | End: 2022-06-16

## 2022-06-16 RX ORDER — DEXAMETHASONE 6 MG/1
6 TABLET ORAL
Qty: 7 | Refills: 0 | Status: DISCONTINUED | COMMUNITY
Start: 2022-01-10 | End: 2022-06-16

## 2022-06-16 RX ORDER — ONDANSETRON 8 MG/1
8 TABLET, ORALLY DISINTEGRATING ORAL 3 TIMES DAILY
Qty: 15 | Refills: 2 | Status: DISCONTINUED | COMMUNITY
Start: 2022-01-10 | End: 2022-06-16

## 2022-06-16 NOTE — HISTORY OF PRESENT ILLNESS
[FreeTextEntry1] : f/u [de-identified] : Numerous chronic complaints\par \par Recurrent chest pain\par abdominal pain\par many er visits\par cardiology and gi w/u neg\par had gall bladder removed\par rash back not responding to otc cream\par depression/anxiety and apinic to start up again with psych\par on no meds\par morbid obesity\par

## 2022-06-16 NOTE — PLAN
[FreeTextEntry1] : Atypical non cardiac chest pain\par anxiety and depression\par await repeat psych \par hyperpigment ed rash to derm

## 2022-06-16 NOTE — PHYSICAL EXAM
[Normal] : soft, non-tender, non-distended, no masses palpated, no HSM and normal bowel sounds [de-identified] : hyperpigment area midlien lower back

## 2022-06-17 LAB
25(OH)D3 SERPL-MCNC: 18 NG/ML
ALBUMIN SERPL ELPH-MCNC: 4.5 G/DL
ALP BLD-CCNC: 93 U/L
ALT SERPL-CCNC: 40 U/L
ANION GAP SERPL CALC-SCNC: 14 MMOL/L
AST SERPL-CCNC: 28 U/L
BASOPHILS # BLD AUTO: 0.03 K/UL
BASOPHILS NFR BLD AUTO: 0.4 %
BILIRUB SERPL-MCNC: 0.3 MG/DL
BUN SERPL-MCNC: 8 MG/DL
CALCIUM SERPL-MCNC: 9.6 MG/DL
CHLORIDE SERPL-SCNC: 100 MMOL/L
CHOLEST SERPL-MCNC: 134 MG/DL
CO2 SERPL-SCNC: 26 MMOL/L
CREAT SERPL-MCNC: 0.7 MG/DL
CRP SERPL-MCNC: 9 MG/L
EGFR: 120 ML/MIN/1.73M2
EOSINOPHIL # BLD AUTO: 0.09 K/UL
EOSINOPHIL NFR BLD AUTO: 1.2 %
ERYTHROCYTE [SEDIMENTATION RATE] IN BLOOD BY WESTERGREN METHOD: 40 MM/HR
ESTIMATED AVERAGE GLUCOSE: 123 MG/DL
GLUCOSE SERPL-MCNC: 95 MG/DL
HBA1C MFR BLD HPLC: 5.9 %
HCT VFR BLD CALC: 43.8 %
HDLC SERPL-MCNC: 23 MG/DL
HGB BLD-MCNC: 13.6 G/DL
IMM GRANULOCYTES NFR BLD AUTO: 0.4 %
LDLC SERPL CALC-MCNC: 38 MG/DL
LYMPHOCYTES # BLD AUTO: 2.21 K/UL
LYMPHOCYTES NFR BLD AUTO: 28.6 %
MAN DIFF?: NORMAL
MCHC RBC-ENTMCNC: 27 PG
MCHC RBC-ENTMCNC: 31.1 GM/DL
MCV RBC AUTO: 87.1 FL
MONOCYTES # BLD AUTO: 0.45 K/UL
MONOCYTES NFR BLD AUTO: 5.8 %
NEUTROPHILS # BLD AUTO: 4.92 K/UL
NEUTROPHILS NFR BLD AUTO: 63.6 %
NONHDLC SERPL-MCNC: 111 MG/DL
PLATELET # BLD AUTO: 217 K/UL
POTASSIUM SERPL-SCNC: 4 MMOL/L
PROT SERPL-MCNC: 7.6 G/DL
RBC # BLD: 5.03 M/UL
RBC # FLD: 13.8 %
SODIUM SERPL-SCNC: 139 MMOL/L
T4 FREE SERPL-MCNC: 1.3 NG/DL
TRIGL SERPL-MCNC: 364 MG/DL
TSH SERPL-ACNC: 1.76 UIU/ML
WBC # FLD AUTO: 7.73 K/UL

## 2022-06-19 NOTE — PLAN
[FreeTextEntry1] : Annual exam\par full bloods\par \par depression obvious and pretty severe\par need mental health professional\par off work for 30 day\par lexapro 5 mg daily for 14 days then 10

## 2022-06-19 NOTE — HEALTH RISK ASSESSMENT
[] : No [Change in mental status noted] : No change in mental status noted [Language] : denies difficulty with language [Behavior] : denies difficulty with behavior [Learning/Retaining New Information] : denies difficulty learning/retaining new information [Handling Complex Tasks] : denies difficulty handling complex tasks [Reasoning] : denies difficulty with reasoning [Spatial Ability and Orientation] : denies difficulty with spatial ability and orientation

## 2022-06-19 NOTE — HISTORY OF PRESENT ILLNESS
[FreeTextEntry1] : Physical  [de-identified] : Physical\par decline all vaccine\par stressed with covid /work from home and kids\par trouble sleeping\par depressed\par not suicidal\par chest pain, leg pain\par numerous somatic issues\par not better after gb out\par \par

## 2022-06-28 ENCOUNTER — NON-APPOINTMENT (OUTPATIENT)
Age: 32
End: 2022-06-28

## 2022-07-26 ENCOUNTER — LABORATORY RESULT (OUTPATIENT)
Age: 32
End: 2022-07-26

## 2022-07-26 ENCOUNTER — APPOINTMENT (OUTPATIENT)
Dept: OBGYN | Facility: CLINIC | Age: 32
End: 2022-07-26

## 2022-07-26 ENCOUNTER — OUTPATIENT (OUTPATIENT)
Dept: OUTPATIENT SERVICES | Facility: HOSPITAL | Age: 32
LOS: 1 days | End: 2022-07-26
Payer: MEDICAID

## 2022-07-26 VITALS — BODY MASS INDEX: 46.5 KG/M2 | DIASTOLIC BLOOD PRESSURE: 82 MMHG | SYSTOLIC BLOOD PRESSURE: 122 MMHG | WEIGHT: 254.25 LBS

## 2022-07-26 DIAGNOSIS — Z98.890 OTHER SPECIFIED POSTPROCEDURAL STATES: Chronic | ICD-10-CM

## 2022-07-26 DIAGNOSIS — N92.6 IRREGULAR MENSTRUATION, UNSPECIFIED: ICD-10-CM

## 2022-07-26 DIAGNOSIS — N76.0 ACUTE VAGINITIS: ICD-10-CM

## 2022-07-26 DIAGNOSIS — N91.1 SECONDARY AMENORRHEA: ICD-10-CM

## 2022-07-26 DIAGNOSIS — Z98.891 HISTORY OF UTERINE SCAR FROM PREVIOUS SURGERY: Chronic | ICD-10-CM

## 2022-07-26 PROCEDURE — 87491 CHLMYD TRACH DNA AMP PROBE: CPT

## 2022-07-26 PROCEDURE — 87800 DETECT AGNT MULT DNA DIREC: CPT

## 2022-07-26 PROCEDURE — 87591 N.GONORRHOEAE DNA AMP PROB: CPT

## 2022-07-26 PROCEDURE — 36415 COLL VENOUS BLD VENIPUNCTURE: CPT

## 2022-07-26 PROCEDURE — 99395 PREV VISIT EST AGE 18-39: CPT

## 2022-07-26 PROCEDURE — G0463: CPT

## 2022-07-26 NOTE — HISTORY OF PRESENT ILLNESS
[Spontaneous/Secondary] : is secondary/spontaneous [Antidepressants] : antidepressants [Previous Amenorrhea] : previous amenorrhea [FreeTextEntry2] : p [unknown] : the patient is unsure of the date of her LMP [Regular Cycle Intervals] : periods have been irregular [Sexually Active] : is sexually active [Monogamous] : is monogamous

## 2022-07-26 NOTE — PHYSICAL EXAM
[Awake] : awake [Alert] : alert [Acute Distress] : no acute distress [Mass] : no breast mass [Nipple Discharge] : no nipple discharge [Axillary LAD] : no axillary lymphadenopathy [Soft] : soft [Tender] : non tender [Distended] : distended [H/Smegaly] : no hepatosplenomegaly [Oriented x3] : oriented to person, place, and time [Depressed Mood] : not depressed [Flat Affect] : affect not flat [Normal] : uterus [Labia Majora] : labia major [Labia Minora] : labia minora [No Bleeding] : there was no active vaginal bleeding [Pap Obtained] : a Pap smear was performed [Motion Tenderness] : there was no cervical motion tenderness [Uterine Adnexae] : were not tender and not enlarged [FreeTextEntry6] : no fullness, no tenderss, no masses on exam

## 2022-07-27 LAB
C TRACH RRNA SPEC QL NAA+PROBE: SIGNIFICANT CHANGE UP
N GONORRHOEA RRNA SPEC QL NAA+PROBE: SIGNIFICANT CHANGE UP
SPECIMEN SOURCE: SIGNIFICANT CHANGE UP

## 2022-07-28 DIAGNOSIS — B96.89 ACUTE VAGINITIS: ICD-10-CM

## 2022-07-28 DIAGNOSIS — N76.0 ACUTE VAGINITIS: ICD-10-CM

## 2022-07-28 LAB
CANDIDA AB TITR SER: SIGNIFICANT CHANGE UP
G VAGINALIS DNA SPEC QL NAA+PROBE: DETECTED
T VAGINALIS SPEC QL WET PREP: SIGNIFICANT CHANGE UP

## 2022-07-29 DIAGNOSIS — N92.6 IRREGULAR MENSTRUATION, UNSPECIFIED: ICD-10-CM

## 2022-07-29 DIAGNOSIS — Z01.419 ENCOUNTER FOR GYNECOLOGICAL EXAMINATION (GENERAL) (ROUTINE) WITHOUT ABNORMAL FINDINGS: ICD-10-CM

## 2022-07-29 DIAGNOSIS — N91.1 SECONDARY AMENORRHEA: ICD-10-CM

## 2022-07-29 LAB — CYTOLOGY SPEC DOC CYTO: SIGNIFICANT CHANGE UP

## 2022-08-29 ENCOUNTER — APPOINTMENT (OUTPATIENT)
Dept: PHYSICAL MEDICINE AND REHAB | Facility: CLINIC | Age: 32
End: 2022-08-29

## 2022-08-30 ENCOUNTER — APPOINTMENT (OUTPATIENT)
Dept: INTERNAL MEDICINE | Facility: CLINIC | Age: 32
End: 2022-08-30

## 2022-08-30 PROCEDURE — 86580 TB INTRADERMAL TEST: CPT

## 2022-09-01 ENCOUNTER — APPOINTMENT (OUTPATIENT)
Dept: INTERNAL MEDICINE | Facility: CLINIC | Age: 32
End: 2022-09-01

## 2022-09-23 ENCOUNTER — APPOINTMENT (OUTPATIENT)
Dept: INTERNAL MEDICINE | Facility: CLINIC | Age: 32
End: 2022-09-23

## 2022-10-25 ENCOUNTER — APPOINTMENT (OUTPATIENT)
Dept: DERMATOLOGY | Facility: CLINIC | Age: 32
End: 2022-10-25

## 2022-10-25 VITALS — BODY MASS INDEX: 46.01 KG/M2 | HEIGHT: 62 IN | WEIGHT: 250 LBS

## 2022-10-25 DIAGNOSIS — B07.9 VIRAL WART, UNSPECIFIED: ICD-10-CM

## 2022-10-25 DIAGNOSIS — L70.0 ACNE VULGARIS: ICD-10-CM

## 2022-10-25 DIAGNOSIS — L99 ORGAN-LIMITED AMYLOIDOSIS: ICD-10-CM

## 2022-10-25 DIAGNOSIS — E85.4 ORGAN-LIMITED AMYLOIDOSIS: ICD-10-CM

## 2022-10-25 PROCEDURE — 17110 DESTRUCTION B9 LES UP TO 14: CPT

## 2022-10-25 PROCEDURE — 99203 OFFICE O/P NEW LOW 30 MIN: CPT | Mod: 25

## 2022-11-29 ENCOUNTER — APPOINTMENT (OUTPATIENT)
Dept: DERMATOLOGY | Facility: CLINIC | Age: 32
End: 2022-11-29

## 2022-11-29 DIAGNOSIS — L91.8 OTHER HYPERTROPHIC DISORDERS OF THE SKIN: ICD-10-CM

## 2022-11-29 PROCEDURE — 99213 OFFICE O/P EST LOW 20 MIN: CPT

## 2022-11-29 PROCEDURE — D0127: CPT

## 2023-01-10 ENCOUNTER — NON-APPOINTMENT (OUTPATIENT)
Age: 33
End: 2023-01-10

## 2023-01-10 ENCOUNTER — APPOINTMENT (OUTPATIENT)
Dept: INTERNAL MEDICINE | Facility: CLINIC | Age: 33
End: 2023-01-10
Payer: MEDICAID

## 2023-01-10 VITALS
WEIGHT: 251 LBS | OXYGEN SATURATION: 98 % | BODY MASS INDEX: 46.19 KG/M2 | HEART RATE: 84 BPM | TEMPERATURE: 98.1 F | HEIGHT: 62 IN | DIASTOLIC BLOOD PRESSURE: 80 MMHG | SYSTOLIC BLOOD PRESSURE: 133 MMHG

## 2023-01-10 DIAGNOSIS — Z00.00 ENCOUNTER FOR GENERAL ADULT MEDICAL EXAMINATION W/OUT ABNORMAL FINDINGS: ICD-10-CM

## 2023-01-10 PROCEDURE — 99395 PREV VISIT EST AGE 18-39: CPT | Mod: 25

## 2023-01-10 PROCEDURE — 93000 ELECTROCARDIOGRAM COMPLETE: CPT

## 2023-01-10 RX ORDER — TRETINOIN 0.25 MG/G
0.03 CREAM TOPICAL
Qty: 1 | Refills: 3 | Status: DISCONTINUED | COMMUNITY
Start: 2022-10-25 | End: 2023-01-10

## 2023-01-10 RX ORDER — MEDROXYPROGESTERONE ACETATE 10 MG/1
10 TABLET ORAL
Qty: 30 | Refills: 1 | Status: DISCONTINUED | COMMUNITY
Start: 2022-07-26 | End: 2023-01-10

## 2023-01-10 RX ORDER — METRONIDAZOLE 7.5 MG/G
0.75 GEL VAGINAL
Qty: 1 | Refills: 0 | Status: DISCONTINUED | COMMUNITY
Start: 2022-07-28 | End: 2023-01-10

## 2023-01-10 RX ORDER — LIDOCAINE AND PRILOCAINE 25; 25 MG/G; MG/G
2.5-2.5 CREAM TOPICAL
Qty: 1 | Refills: 0 | Status: DISCONTINUED | COMMUNITY
Start: 2022-10-25 | End: 2023-01-10

## 2023-01-10 NOTE — PLAN
[FreeTextEntry1] : Annual exam\par Decline vaccine\par recent gyn\par \par atypical cp most likely musculoskeletal\par no finding on exam of chest wall or breast\par check infl marker and d dimer\par ekg\par morbid obesity\par will try omeprazole 40

## 2023-01-10 NOTE — HISTORY OF PRESENT ILLNESS
[FreeTextEntry1] : Annual [de-identified] : Annual\par decline all vaccine\par 2 case of mild covid\par \par chest pain since 2018\par different chest pan under left breast\par intermittent\par hurts when twist\par no travel\par no heartburn

## 2023-01-10 NOTE — HEALTH RISK ASSESSMENT
[Good] : ~his/her~  mood as  good [Yes] : Yes [2 - 4 times a month (2 pts)] : 2-4 times a month (2 points) [Never (0 pts)] : Never (0 points) [No falls in past year] : Patient reported no falls in the past year [0] : 2) Feeling down, depressed, or hopeless: Not at all (0) [[id number]] : 0 [Change in mental status noted] : No change in mental status noted [Language] : denies difficulty with language [Behavior] : denies difficulty with behavior [Learning/Retaining New Information] : denies difficulty learning/retaining new information [Handling Complex Tasks] : denies difficulty handling complex tasks [Reasoning] : denies difficulty with reasoning [Spatial Ability and Orientation] : denies difficulty with spatial ability and orientation [None] : None [With Family] : lives with family [Reports changes in hearing] : Reports no changes in hearing [Reports changes in vision] : Reports no changes in vision [Reports changes in dental health] : Reports no changes in dental health [Smoke Detector] : smoke detector [Carbon Monoxide Detector] : carbon monoxide detector [Guns at Home] : guns at home [Seat Belt] :  uses seat belt

## 2023-01-11 LAB
ALBUMIN SERPL ELPH-MCNC: 4.4 G/DL
ALP BLD-CCNC: 88 U/L
ALT SERPL-CCNC: 38 U/L
ANION GAP SERPL CALC-SCNC: 13 MMOL/L
AST SERPL-CCNC: 22 U/L
BASOPHILS # BLD AUTO: 0.02 K/UL
BASOPHILS NFR BLD AUTO: 0.3 %
BILIRUB SERPL-MCNC: 0.3 MG/DL
BUN SERPL-MCNC: 7 MG/DL
CALCIUM SERPL-MCNC: 9.6 MG/DL
CHLORIDE SERPL-SCNC: 102 MMOL/L
CHOLEST SERPL-MCNC: 137 MG/DL
CO2 SERPL-SCNC: 23 MMOL/L
CREAT SERPL-MCNC: 0.61 MG/DL
CRP SERPL-MCNC: 11 MG/L
DEPRECATED D DIMER PPP IA-ACNC: <150 NG/ML DDU
EGFR: 123 ML/MIN/1.73M2
EOSINOPHIL # BLD AUTO: 0.09 K/UL
EOSINOPHIL NFR BLD AUTO: 1.3 %
ERYTHROCYTE [SEDIMENTATION RATE] IN BLOOD BY WESTERGREN METHOD: 44 MM/HR
ESTIMATED AVERAGE GLUCOSE: 123 MG/DL
GLUCOSE SERPL-MCNC: 84 MG/DL
HBA1C MFR BLD HPLC: 5.9 %
HCT VFR BLD CALC: 41.6 %
HDLC SERPL-MCNC: 25 MG/DL
HGB BLD-MCNC: 13.3 G/DL
IMM GRANULOCYTES NFR BLD AUTO: 0.3 %
LDLC SERPL CALC-MCNC: 63 MG/DL
LYMPHOCYTES # BLD AUTO: 2.04 K/UL
LYMPHOCYTES NFR BLD AUTO: 29 %
MAN DIFF?: NORMAL
MCHC RBC-ENTMCNC: 27.4 PG
MCHC RBC-ENTMCNC: 32 GM/DL
MCV RBC AUTO: 85.6 FL
MONOCYTES # BLD AUTO: 0.26 K/UL
MONOCYTES NFR BLD AUTO: 3.7 %
NEUTROPHILS # BLD AUTO: 4.6 K/UL
NEUTROPHILS NFR BLD AUTO: 65.4 %
NONHDLC SERPL-MCNC: 112 MG/DL
PLATELET # BLD AUTO: 204 K/UL
POTASSIUM SERPL-SCNC: 4.5 MMOL/L
PROT SERPL-MCNC: 7.5 G/DL
RBC # BLD: 4.86 M/UL
RBC # FLD: 14.3 %
SODIUM SERPL-SCNC: 139 MMOL/L
T4 FREE SERPL-MCNC: 1.2 NG/DL
TRIGL SERPL-MCNC: 241 MG/DL
TSH SERPL-ACNC: 1.62 UIU/ML
WBC # FLD AUTO: 7.03 K/UL

## 2023-01-19 ENCOUNTER — NON-APPOINTMENT (OUTPATIENT)
Age: 33
End: 2023-01-19

## 2023-03-09 NOTE — ED ADULT NURSE NOTE - NS ED NOTE  TALK SOMEONE YN
Patient called back on the left message. Transferred to [facility]. No further questions at this time.    No

## 2023-03-23 NOTE — REVIEW OF SYSTEMS
HUB TO READ:    Called and left JARAD Reyna sent in antibiotic to [facility]. Pt needs to start taking.   [Negative] : Heme/Lymph

## 2023-07-12 NOTE — ED ADULT TRIAGE NOTE - AS TEMP SITE
Cardiology Follow Up    Sheree Alvarez  1971  [id number]  [facility] [address] [address] [phone]    Discussion/Summary:  1. Microvascular angina  2. Non-ischemic myocardial injury with last known LV function of 40%.  NYHA class I  3. LBBB  4. Bradycardia   5. Tobacco abuse  6. Nonobstructive CAD    Microvascular angina:  Cont with metoprolol, ranexa and entresto  States has episdoes of diaphoresis. Will check a nuclear stress test.     Nonischemic cardiomyopathy:  Will increase entresto. Cont with metoprolol  Amb holter monitor was reviewed. We will check a cardiac MRI. No dizziness or lightheadedness. Cont to smoke. Not interested in quitting. She will inform us with onset of cardiac symptoms     Previous studies were reviewed. Safety measures were reviewed. Questions were entertained and answered. Patient was advised to report any problems requiring medical attention. Follow-up with PCP and appropriate specialist and lab work as discussed. Return for follow up visit as scheduled or earlier, if needed. Thank you for allowing me to participate in the care and evaluation of your patient. Should you have any questions, please feel free to contact me. History of Present Illness:     Latisha Mueller is a 50 y.o.  female who presents for follow up for cardiovascular care. Denies chest pressure, shortness of breath, dizziness, lightheadedness, presyncope or syncope. Denies orthopnea, PND or lower limb edema. Occasional chest pain which is unchanged. However, is concerned. Also, has had occasional episodes of increased sweating.        Cardiac cath 10/22:  • No significant obstructive coronary artery disease     No significant obstructive coronary artery disease.        Amb holter monitor:    [facility]  Event Recorder Results  Indication: Other forms of angina   Duration of monitorin days  Interpretation:    70 events were transmitted. 50 patient triggered; 20 auto triggered  Patient monitored for 17d 16h 25m  PACs were not found during the monitoring period  PVCs were not found during the monitoring period  Lowest heart rate was 49 bpm in sinus bradycardia. A-fib: None  SVT: None  Pauses: None  Heart blocks: None  Patient reports multiple episodes of dizziness and chest pain.  Correlation of tracing at the time shows sinus rhythm. VT: None     Echo 23:    •  Left Ventricle: Left ventricular cavity size is normal. Wall thickness is normal. Systolic function is moderately reduced (40%). There is moderate global hypokinesis with regional variation. Diastolic function is normal.  •  Right Ventricle: Right ventricular cavity size is normal. Systolic function is normal.  •  Mitral Valve: There is mild to moderate regurgitation. •  Tricuspid Valve: There is trace to mild regurgitation.     Patient Active Problem List   Diagnosis   • Recurrent major depressive disorder, in partial remission (HCC)   • Dysuria   • Gastroesophageal reflux disease without esophagitis   • Genital labial ulcer   • Bipolar 1 disorder (HCC)   • Schizophrenia ([address])   • Essential hypertension   • Other fatigue   • Tobacco abuse   • Dyslipidemia, goal LDL below 70   • Well adult exam   • Finger infection   • Hematoma   • C. difficile colitis   • Unstable angina (HCC)   • Non-ischemic cardiomyopathy (HCC)     Past Medical History:   Diagnosis Date   • Anxiety    • Bipolar 1 disorder ([address]) 2020   • Bipolar 1 disorder ([address]) 2020   • Bipolar disease, manic ([facility])    • Depression    • Gastroesophageal reflux disease without esophagitis 2020   • Schizophrenia ([address])      Social History     Socioeconomic History   • Marital status: /Civil Union     Spouse name: Not on file   • Number of children: Not on file   • Years of education: Not on file   • Highest education level: Not on file   Occupational History   • Not on file   Tobacco Use   • Smoking status: Every Day     Packs/day: 0.25     Types: Cigarettes   • Smokeless tobacco: Never   Vaping Use   • Vaping Use: Never used   Substance and Sexual Activity   • Alcohol use: Not Currently   • Drug use: Not Currently     Types: Methamphetamines   • Sexual activity: Not on file   Other Topics Concern   • Not on file   Social History Narrative    · Do you currently or have you served in the [address] [place] [address] [employer]:   No      · Were you activated, into active duty, as a member of the [employer] or as a Reservist:   No        · Exercise level:   None      · Diet:   Regular      · General stress level:   High       · Caffeine intake: Moderate        · Seat belts used routinely:   Yes      · Smoke alarm in home:   Yes      Social Determinants of Health     Financial Resource Strain: Not on file   Food Insecurity: Food Insecurity Present (10/26/2021)    Hunger Vital Sign    • Worried About Running Out of Food in the Last Year: Sometimes true    • Ran Out of Food in the Last Year: Sometimes true   Transportation Needs: No Transportation Needs (10/26/2021)    PRAPARE - Transportation    • Lack of Transportation (Medical): No    • Lack of Transportation (Non-Medical):  No   Physical Activity: Not on file   Stress: Not on file   Social Connections: Not on file   Intimate Partner Violence: Not on file   Housing Stability: High Risk (10/26/2021)    Housing Stability Vital Sign    • Unable to Pay for Housing in the Last Year: Yes    • Number of Places Lived in the Last Year: 2    • Unstable Housing in the Last Year: Yes      Family History   Problem Relation Age of Onset   • Heart attack Mother    • Heart disease Mother    • Heart failure Father    • COPD Father    • Liver cancer Sister    • Colon cancer Sister 59   • Kidney cancer Sister    • Breast cancer Maternal Grandmother    • Heart disease Brother    • Colon cancer Brother 79   • Liver cancer Brother    • Heart attack Maternal Uncle      Past Surgical History:   Procedure Laterality Date   • CARDIAC CATHETERIZATION N/A 10/24/2022    Procedure: Cardiac Coronary Angiogram;  Surgeon: Nelly Chicas MD;  Location: [address] [place] [address] CATH LAB;   Service: Cardiology   •  SECTION      x2   • HYSTERECTOMY     • KNEE SURGERY Left     4 times   • LEG SURGERY Right     thony placed then removed, broken femur   • NEUROPLASTY / TRANSPOSITION MEDIAN NERVE AT CARPAL TUNNEL         Current Outpatient Medications:   •  ALPRAZolam (XANAX) 0.5 mg tablet, Take 1 tablet (0.5 mg total) by mouth 2 (two) times a day as needed for anxiety, Disp: 60 tablet, Rfl: 3  •  amLODIPine (NORVASC) 5 mg tablet, Take 1 tablet (5 mg total) by mouth daily, Disp: 30 tablet, Rfl: 5  •  aspirin (ECOTRIN LOW STRENGTH) 81 mg EC tablet, Take 1 tablet (81 mg total) by mouth daily, Disp: 90 tablet, Rfl: 3  •  atorvastatin (LIPITOR) 20 mg tablet, Take 1 tablet (20 mg total) by mouth daily, Disp: 60 tablet, Rfl: 6  •  buPROPion (WELLBUTRIN XL) 150 mg 24 hr tablet, Take 1 tablet (150 mg total) by mouth every morning, Disp: 90 tablet, Rfl: 3  •  esomeprazole (NexIUM) 40 MG capsule, Take 1 capsule (40 mg total) by mouth 2 (two) times a day before meals, Disp: 60 capsule, Rfl: 3  •  metoprolol tartrate (LOPRESSOR) 25 mg tablet, Take 1 tablet (25 mg total) by mouth every 12 (twelve) hours, Disp: 60 tablet, Rfl: 3  •  PARoxetine (PAXIL) 40 MG tablet, Take 1 tablet (40 mg total) by mouth every morning, Disp: 90 tablet, Rfl: 1  •  ranolazine (RANEXA) 500 mg 12 hr tablet, take 1 tablet by mouth twice a day, Disp: 60 tablet, Rfl: 2  •  sacubitril-valsartan (Entresto) 24-26 MG TABS, Take 1 tablet by mouth 2 (two) times a day, Disp: 120 tablet, Rfl: 4  •  zolpidem (AMBIEN) 10 mg tablet, Take 1 tablet (10 mg total) by mouth daily at bedtime as needed for sleep, Disp: 30 tablet, Rfl: 5  •  Flowflex COVID-19 Ag Home Test KIT, , Disp: , Rfl:   •  fluconazole (DIFLUCAN) 150 mg tablet, take 1 tablet by mouth AS A ONE TIME DOSE (Patient not taking: Reported on 7/12/2023), Disp: , Rfl:   •  Probiotic Product (Align) 4 MG CAPS, Take 1 capsule (4 mg total) by mouth in the morning (Patient not taking: Reported on 7/12/2023), Disp: 30 capsule, Rfl: 11  •  psyllium (METAMUCIL) 0.52 g capsule, 2 capsules BID (Patient not taking: Reported on 7/12/2023), Disp: 120 capsule, Rfl: 6  Allergies   Allergen Reactions   • Ciprofloxacin Hives   • Wound Dressing Adhesive Other (See Comments)     unknown   • Atarax [Hydroxyzine] Palpitations       Labs:  Lab Results   Component Value Date    WBC 8.07 10/20/2022    HGB 16.1 (H) 10/20/2022    HCT 47.8 (H) 10/20/2022    MCV 89 10/20/2022     10/20/2022     Lab Results   Component Value Date    CALCIUM 9.9 11/15/2022    K 4.8 11/15/2022    CO2 29 11/15/2022     11/15/2022    BUN 19 11/15/2022    CREATININE 0.73 11/15/2022     No results found for: "HGBA1C"  No results found for: "CHOL"  Lab Results   Component Value Date    HDL 50 11/15/2022    HDL 60 07/28/2020     Lab Results   Component Value Date    LDLCALC 108 (H) 11/15/2022    LDLCALC 97 07/28/2020     Lab Results   Component Value Date    TRIG 147 11/15/2022    TRIG 213 (H) 07/28/2020     No results found for: "CHOLHDL"    Review of Systems:  Review of Systems   Constitutional: Positive for diaphoresis. Negative for activity change, appetite change, fatigue and fever. Respiratory: Negative for chest tightness, shortness of breath and wheezing. Cardiovascular: Negative for chest pain, palpitations and leg swelling. Gastrointestinal: Negative for nausea and vomiting. Musculoskeletal: Negative for arthralgias. Neurological: Negative for dizziness, syncope, weakness and light-headedness. All other systems reviewed and are negative. Physical Exam:  Physical Exam  Vitals and nursing note reviewed.    Constitutional:       General: She is not in acute distress. Appearance: Normal appearance. She is not ill-appearing or diaphoretic. HENT:      Head: Normocephalic and atraumatic. Eyes:      Extraocular Movements: Extraocular movements intact. Cardiovascular:      Rate and Rhythm: Normal rate and regular rhythm. Heart sounds: No murmur heard. No friction rub. No gallop. Pulmonary:      Effort: No respiratory distress. Breath sounds: Normal breath sounds. Abdominal:      General: There is no distension. Palpations: Abdomen is soft. Musculoskeletal:         General: No swelling. Normal range of motion. Skin:     General: Skin is warm and dry. Capillary Refill: Capillary refill takes less than 2 seconds. Coloration: Skin is not pale. Neurological:      General: No focal deficit present. Mental Status: She is alert and oriented to person, place, and time. Cranial Nerves: No cranial nerve deficit.    Psychiatric:         Behavior: Behavior normal. oral

## 2023-08-30 ENCOUNTER — APPOINTMENT (OUTPATIENT)
Dept: INTERNAL MEDICINE | Facility: CLINIC | Age: 33
End: 2023-08-30
Payer: MEDICAID

## 2023-08-30 VITALS
HEIGHT: 62 IN | BODY MASS INDEX: 44.16 KG/M2 | OXYGEN SATURATION: 98 % | DIASTOLIC BLOOD PRESSURE: 82 MMHG | SYSTOLIC BLOOD PRESSURE: 126 MMHG | HEART RATE: 87 BPM | TEMPERATURE: 98.3 F | WEIGHT: 240 LBS

## 2023-08-30 DIAGNOSIS — R07.89 OTHER CHEST PAIN: ICD-10-CM

## 2023-08-30 DIAGNOSIS — R05.9 COUGH, UNSPECIFIED: ICD-10-CM

## 2023-08-30 DIAGNOSIS — K21.9 GASTRO-ESOPHAGEAL REFLUX DISEASE W/OUT ESOPHAGITIS: ICD-10-CM

## 2023-08-30 PROCEDURE — 99213 OFFICE O/P EST LOW 20 MIN: CPT

## 2023-08-30 RX ORDER — OMEPRAZOLE 40 MG/1
40 CAPSULE, DELAYED RELEASE ORAL
Qty: 1 | Refills: 1 | Status: DISCONTINUED | COMMUNITY
Start: 2023-01-10 | End: 2023-08-30

## 2023-08-30 NOTE — PHYSICAL EXAM
[Normal] : no carotid or abdominal bruits heard, no varicosities, pedal pulses are present, no peripheral edema, no extremity clubbing or cyanosis and no palpable aorta [de-identified] : swollen turbinates

## 2023-08-30 NOTE — HISTORY OF PRESENT ILLNESS
[FreeTextEntry1] : f/u [de-identified] : gerd/atypical cp not good on omperazole cough couple week yellow phlegm

## 2023-08-30 NOTE — PLAN
[FreeTextEntry1] : stop omerprazole trial famotidine 40 bid  amox/clav  875/125 pred 40 x 4 da then 20 x 4 day

## 2023-09-18 ENCOUNTER — OUTPATIENT (OUTPATIENT)
Dept: OUTPATIENT SERVICES | Facility: HOSPITAL | Age: 33
LOS: 1 days | End: 2023-09-18
Payer: MEDICAID

## 2023-09-18 ENCOUNTER — APPOINTMENT (OUTPATIENT)
Dept: OBGYN | Facility: CLINIC | Age: 33
End: 2023-09-18
Payer: MEDICAID

## 2023-09-18 VITALS — BODY MASS INDEX: 43.71 KG/M2 | SYSTOLIC BLOOD PRESSURE: 130 MMHG | WEIGHT: 239 LBS | DIASTOLIC BLOOD PRESSURE: 80 MMHG

## 2023-09-18 DIAGNOSIS — Z98.890 OTHER SPECIFIED POSTPROCEDURAL STATES: Chronic | ICD-10-CM

## 2023-09-18 DIAGNOSIS — N76.0 ACUTE VAGINITIS: ICD-10-CM

## 2023-09-18 DIAGNOSIS — Z98.891 HISTORY OF UTERINE SCAR FROM PREVIOUS SURGERY: Chronic | ICD-10-CM

## 2023-09-18 LAB — HCG UR QL: POSITIVE

## 2023-09-18 PROCEDURE — G0463: CPT

## 2023-09-18 PROCEDURE — 81025 URINE PREGNANCY TEST: CPT

## 2023-09-18 PROCEDURE — 99213 OFFICE O/P EST LOW 20 MIN: CPT | Mod: TH,25

## 2023-09-19 DIAGNOSIS — Z00.00 ENCOUNTER FOR GENERAL ADULT MEDICAL EXAMINATION WITHOUT ABNORMAL FINDINGS: ICD-10-CM

## 2023-09-19 DIAGNOSIS — Z32.00 ENCOUNTER FOR PREGNANCY TEST, RESULT UNKNOWN: ICD-10-CM

## 2023-09-25 ENCOUNTER — APPOINTMENT (OUTPATIENT)
Dept: ANTEPARTUM | Facility: CLINIC | Age: 33
End: 2023-09-25
Payer: MEDICAID

## 2023-09-25 ENCOUNTER — ASOB RESULT (OUTPATIENT)
Age: 33
End: 2023-09-25

## 2023-09-25 PROCEDURE — 76801 OB US < 14 WKS SINGLE FETUS: CPT

## 2023-10-02 ENCOUNTER — NON-APPOINTMENT (OUTPATIENT)
Age: 33
End: 2023-10-02

## 2023-10-04 ENCOUNTER — LABORATORY RESULT (OUTPATIENT)
Age: 33
End: 2023-10-04

## 2023-10-04 ENCOUNTER — OUTPATIENT (OUTPATIENT)
Dept: OUTPATIENT SERVICES | Facility: HOSPITAL | Age: 33
LOS: 1 days | End: 2023-10-04
Payer: MEDICAID

## 2023-10-04 ENCOUNTER — APPOINTMENT (OUTPATIENT)
Dept: OBGYN | Facility: CLINIC | Age: 33
End: 2023-10-04
Payer: MEDICAID

## 2023-10-04 VITALS — WEIGHT: 234 LBS | BODY MASS INDEX: 42.8 KG/M2 | SYSTOLIC BLOOD PRESSURE: 122 MMHG | DIASTOLIC BLOOD PRESSURE: 80 MMHG

## 2023-10-04 DIAGNOSIS — Z82.49 FAMILY HISTORY OF ISCHEMIC HEART DISEASE AND OTHER DISEASES OF THE CIRCULATORY SYSTEM: ICD-10-CM

## 2023-10-04 DIAGNOSIS — N91.1 SECONDARY AMENORRHEA: ICD-10-CM

## 2023-10-04 DIAGNOSIS — Z98.891 HISTORY OF UTERINE SCAR FROM PREVIOUS SURGERY: Chronic | ICD-10-CM

## 2023-10-04 DIAGNOSIS — Z09 ENCOUNTER FOR FOLLOW-UP EXAMINATION AFTER COMPLETED TREATMENT FOR CONDITIONS OTHER THAN MALIGNANT NEOPLASM: ICD-10-CM

## 2023-10-04 DIAGNOSIS — E66.9 OBESITY, UNSPECIFIED: ICD-10-CM

## 2023-10-04 DIAGNOSIS — Z87.59 PERSONAL HISTORY OF OTHER COMPLICATIONS OF PREGNANCY, CHILDBIRTH AND THE PUERPERIUM: ICD-10-CM

## 2023-10-04 DIAGNOSIS — Z34.91 ENCOUNTER FOR SUPERVISION OF NORMAL PREGNANCY, UNSPECIFIED, FIRST TRIMESTER: ICD-10-CM

## 2023-10-04 DIAGNOSIS — Z32.00 ENCOUNTER FOR PREGNANCY TEST, RESULT UNKNOWN: ICD-10-CM

## 2023-10-04 DIAGNOSIS — Z01.419 ENCOUNTER FOR GYNECOLOGICAL EXAMINATION (GENERAL) (ROUTINE) W/OUT ABNORMAL FINDINGS: ICD-10-CM

## 2023-10-04 DIAGNOSIS — Z11.3 ENCOUNTER FOR SCREENING FOR INFECTIONS WITH A PREDOMINANTLY SEXUAL MODE OF TRANSMISSION: ICD-10-CM

## 2023-10-04 DIAGNOSIS — Z34.80 ENCOUNTER FOR SUPERVISION OF OTHER NORMAL PREGNANCY, UNSPECIFIED TRIMESTER: ICD-10-CM

## 2023-10-04 LAB
A1C WITH ESTIMATED AVERAGE GLUCOSE RESULT: 5.8 % — HIGH (ref 4–5.6)
BASOPHILS # BLD AUTO: 0.02 K/UL — SIGNIFICANT CHANGE UP (ref 0–0.2)
BASOPHILS NFR BLD AUTO: 0.3 % — SIGNIFICANT CHANGE UP (ref 0–2)
EOSINOPHIL # BLD AUTO: 0.07 K/UL — SIGNIFICANT CHANGE UP (ref 0–0.5)
EOSINOPHIL NFR BLD AUTO: 1 % — SIGNIFICANT CHANGE UP (ref 0–6)
ESTIMATED AVERAGE GLUCOSE: 120 MG/DL — HIGH (ref 68–114)
GLUCOSE 1H P MEAL SERPL-MCNC: 169 MG/DL — HIGH (ref 70–134)
HCT VFR BLD CALC: 40.2 % — SIGNIFICANT CHANGE UP (ref 34.5–45)
HGB BLD-MCNC: 13 G/DL — SIGNIFICANT CHANGE UP (ref 11.5–15.5)
HIV 1+2 AB+HIV1 P24 AG SERPL QL IA: SIGNIFICANT CHANGE UP
IMM GRANULOCYTES NFR BLD AUTO: 0.7 % — SIGNIFICANT CHANGE UP (ref 0–0.9)
LYMPHOCYTES # BLD AUTO: 1.77 K/UL — SIGNIFICANT CHANGE UP (ref 1–3.3)
LYMPHOCYTES # BLD AUTO: 24.5 % — SIGNIFICANT CHANGE UP (ref 13–44)
MCHC RBC-ENTMCNC: 28.3 PG — SIGNIFICANT CHANGE UP (ref 27–34)
MCHC RBC-ENTMCNC: 32.3 GM/DL — SIGNIFICANT CHANGE UP (ref 32–36)
MCV RBC AUTO: 87.6 FL — SIGNIFICANT CHANGE UP (ref 80–100)
MONOCYTES # BLD AUTO: 0.24 K/UL — SIGNIFICANT CHANGE UP (ref 0–0.9)
MONOCYTES NFR BLD AUTO: 3.3 % — SIGNIFICANT CHANGE UP (ref 2–14)
NEUTROPHILS # BLD AUTO: 5.07 K/UL — SIGNIFICANT CHANGE UP (ref 1.8–7.4)
NEUTROPHILS NFR BLD AUTO: 70.2 % — SIGNIFICANT CHANGE UP (ref 43–77)
PLATELET # BLD AUTO: 189 K/UL — SIGNIFICANT CHANGE UP (ref 150–400)
RBC # BLD: 4.59 M/UL — SIGNIFICANT CHANGE UP (ref 3.8–5.2)
RBC # FLD: 14.5 % — SIGNIFICANT CHANGE UP (ref 10.3–14.5)
TSH SERPL-MCNC: 0.08 UIU/ML — LOW (ref 0.27–4.2)
WBC # BLD: 7.22 K/UL — SIGNIFICANT CHANGE UP (ref 3.8–10.5)
WBC # FLD AUTO: 7.22 K/UL — SIGNIFICANT CHANGE UP (ref 3.8–10.5)

## 2023-10-04 PROCEDURE — G0101: CPT | Mod: NC

## 2023-10-04 PROCEDURE — G0452: CPT | Mod: 26

## 2023-10-04 PROCEDURE — 76815 OB US LIMITED FETUS(S): CPT | Mod: 26,NC

## 2023-10-04 PROCEDURE — 99213 OFFICE O/P EST LOW 20 MIN: CPT | Mod: TH,25

## 2023-10-04 PROCEDURE — 83020 HEMOGLOBIN ELECTROPHORESIS: CPT | Mod: 26

## 2023-10-04 RX ORDER — AMOXICILLIN AND CLAVULANATE POTASSIUM 875; 125 MG/1; MG/1
875-125 TABLET, COATED ORAL
Qty: 14 | Refills: 1 | Status: DISCONTINUED | COMMUNITY
Start: 2023-08-30 | End: 2023-10-04

## 2023-10-04 RX ORDER — PREDNISONE 20 MG/1
20 TABLET ORAL
Qty: 12 | Refills: 0 | Status: DISCONTINUED | COMMUNITY
Start: 2023-08-30 | End: 2023-10-04

## 2023-10-05 LAB
C TRACH RRNA SPEC QL NAA+PROBE: SIGNIFICANT CHANGE UP
CULTURE RESULTS: SIGNIFICANT CHANGE UP
HBV SURFACE AG SER-ACNC: SIGNIFICANT CHANGE UP
HCV AB S/CO SERPL IA: 0.11 S/CO — SIGNIFICANT CHANGE UP (ref 0–0.99)
HCV AB SERPL-IMP: SIGNIFICANT CHANGE UP
HEMOGLOBIN INTERPRETATION: SIGNIFICANT CHANGE UP
HGB A MFR BLD: 97.5 % — SIGNIFICANT CHANGE UP (ref 95.8–98)
HGB A2 MFR BLD: 2.5 % — SIGNIFICANT CHANGE UP (ref 2–3.2)
HPV HIGH+LOW RISK DNA PNL CVX: SIGNIFICANT CHANGE UP
MEV IGG SER-ACNC: 240 AU/ML — SIGNIFICANT CHANGE UP
MEV IGG+IGM SER-IMP: POSITIVE — SIGNIFICANT CHANGE UP
N GONORRHOEA RRNA SPEC QL NAA+PROBE: SIGNIFICANT CHANGE UP
RUBV IGG SER-ACNC: 1.2 INDEX — SIGNIFICANT CHANGE UP
RUBV IGG SER-IMP: POSITIVE — SIGNIFICANT CHANGE UP
SPECIMEN SOURCE: SIGNIFICANT CHANGE UP
SPECIMEN SOURCE: SIGNIFICANT CHANGE UP
T PALLIDUM AB TITR SER: NEGATIVE — SIGNIFICANT CHANGE UP

## 2023-10-06 DIAGNOSIS — E66.9 OBESITY, UNSPECIFIED: ICD-10-CM

## 2023-10-06 DIAGNOSIS — Z82.49 FAMILY HISTORY OF ISCHEMIC HEART DISEASE AND OTHER DISEASES OF THE CIRCULATORY SYSTEM: ICD-10-CM

## 2023-10-06 DIAGNOSIS — Z87.59 PERSONAL HISTORY OF OTHER COMPLICATIONS OF PREGNANCY, CHILDBIRTH AND THE PUERPERIUM: ICD-10-CM

## 2023-10-06 DIAGNOSIS — Z34.90 ENCOUNTER FOR SUPERVISION OF NORMAL PREGNANCY, UNSPECIFIED, UNSPECIFIED TRIMESTER: ICD-10-CM

## 2023-10-06 DIAGNOSIS — Z34.91 ENCOUNTER FOR SUPERVISION OF NORMAL PREGNANCY, UNSPECIFIED, FIRST TRIMESTER: ICD-10-CM

## 2023-10-06 LAB
FRAGILE X PROTEIN (FMRP) PNL BLD: SIGNIFICANT CHANGE UP
GAMMA INTERFERON BACKGROUND BLD IA-ACNC: 0.02 IU/ML — SIGNIFICANT CHANGE UP
LEAD BLD-MCNC: <1 UG/DL — SIGNIFICANT CHANGE UP (ref 0–3.4)
M TB IFN-G BLD-IMP: NEGATIVE — SIGNIFICANT CHANGE UP
M TB IFN-G CD4+ BCKGRND COR BLD-ACNC: 0.02 IU/ML — SIGNIFICANT CHANGE UP
M TB IFN-G CD4+CD8+ BCKGRND COR BLD-ACNC: 0.01 IU/ML — SIGNIFICANT CHANGE UP
QUANT TB PLUS MITOGEN MINUS NIL: 6.68 IU/ML — SIGNIFICANT CHANGE UP

## 2023-10-07 LAB — CYTOLOGY SPEC DOC CYTO: SIGNIFICANT CHANGE UP

## 2023-10-09 ENCOUNTER — LABORATORY RESULT (OUTPATIENT)
Age: 33
End: 2023-10-09

## 2023-10-09 LAB
CYSTIC FIBROSIS EXPANDED PANEL: SIGNIFICANT CHANGE UP
SMA INTERPRETATION: SIGNIFICANT CHANGE UP

## 2023-10-09 PROCEDURE — 76815 OB US LIMITED FETUS(S): CPT

## 2023-10-09 PROCEDURE — 81329 SMN1 GENE DOS/DELETION ALYS: CPT

## 2023-10-09 PROCEDURE — 87591 N.GONORRHOEAE DNA AMP PROB: CPT

## 2023-10-09 PROCEDURE — 85025 COMPLETE CBC W/AUTO DIFF WBC: CPT

## 2023-10-09 PROCEDURE — 81003 URINALYSIS AUTO W/O SCOPE: CPT

## 2023-10-09 PROCEDURE — 82950 GLUCOSE TEST: CPT

## 2023-10-09 PROCEDURE — 84443 ASSAY THYROID STIM HORMONE: CPT

## 2023-10-09 PROCEDURE — 86850 RBC ANTIBODY SCREEN: CPT

## 2023-10-09 PROCEDURE — 83655 ASSAY OF LEAD: CPT

## 2023-10-09 PROCEDURE — 86780 TREPONEMA PALLIDUM: CPT

## 2023-10-09 PROCEDURE — 86803 HEPATITIS C AB TEST: CPT

## 2023-10-09 PROCEDURE — 82952 GTT-ADDED SAMPLES: CPT

## 2023-10-09 PROCEDURE — 86900 BLOOD TYPING SEROLOGIC ABO: CPT

## 2023-10-09 PROCEDURE — 87624 HPV HI-RISK TYP POOLED RSLT: CPT

## 2023-10-09 PROCEDURE — 87389 HIV-1 AG W/HIV-1&-2 AB AG IA: CPT

## 2023-10-09 PROCEDURE — 87340 HEPATITIS B SURFACE AG IA: CPT

## 2023-10-09 PROCEDURE — 86762 RUBELLA ANTIBODY: CPT

## 2023-10-09 PROCEDURE — 88175 CYTOPATH C/V AUTO FLUID REDO: CPT

## 2023-10-09 PROCEDURE — 87491 CHLMYD TRACH DNA AMP PROBE: CPT

## 2023-10-09 PROCEDURE — 86901 BLOOD TYPING SEROLOGIC RH(D): CPT

## 2023-10-09 PROCEDURE — G0463: CPT

## 2023-10-09 PROCEDURE — 82951 GLUCOSE TOLERANCE TEST (GTT): CPT

## 2023-10-09 PROCEDURE — 36415 COLL VENOUS BLD VENIPUNCTURE: CPT

## 2023-10-09 PROCEDURE — 86480 TB TEST CELL IMMUN MEASURE: CPT

## 2023-10-09 PROCEDURE — 83020 HEMOGLOBIN ELECTROPHORESIS: CPT

## 2023-10-09 PROCEDURE — 81243 FMR1 GEN ALY DETC ABNL ALLEL: CPT

## 2023-10-09 PROCEDURE — G0101: CPT

## 2023-10-09 PROCEDURE — 86765 RUBEOLA ANTIBODY: CPT

## 2023-10-09 PROCEDURE — 87086 URINE CULTURE/COLONY COUNT: CPT

## 2023-10-09 PROCEDURE — 83036 HEMOGLOBIN GLYCOSYLATED A1C: CPT

## 2023-10-09 PROCEDURE — 81220 CFTR GENE COM VARIANTS: CPT

## 2023-10-09 PROCEDURE — 97802 MEDICAL NUTRITION INDIV IN: CPT

## 2023-10-10 LAB
GESTATIONAL GTT PNL UR+SERPL: 91 MG/DL — SIGNIFICANT CHANGE UP (ref 70–94)
GLUCOSE 1H P CHAL SERPL-MCNC: 184 MG/DL — HIGH (ref 70–179)
GTT GEST 2H PNL UR+SERPL: 135 MG/DL — SIGNIFICANT CHANGE UP (ref 70–154)
GTT GEST 3H PNL SERPL: 123 MG/DL — SIGNIFICANT CHANGE UP (ref 70–139)

## 2023-10-23 ENCOUNTER — NON-APPOINTMENT (OUTPATIENT)
Age: 33
End: 2023-10-23

## 2023-10-23 ENCOUNTER — ASOB RESULT (OUTPATIENT)
Age: 33
End: 2023-10-23

## 2023-10-23 ENCOUNTER — APPOINTMENT (OUTPATIENT)
Dept: ANTEPARTUM | Facility: CLINIC | Age: 33
End: 2023-10-23
Payer: MEDICAID

## 2023-10-23 PROCEDURE — 76801 OB US < 14 WKS SINGLE FETUS: CPT | Mod: 59

## 2023-10-23 PROCEDURE — 76813 OB US NUCHAL MEAS 1 GEST: CPT

## 2023-10-31 ENCOUNTER — NON-APPOINTMENT (OUTPATIENT)
Age: 33
End: 2023-10-31

## 2023-11-02 ENCOUNTER — APPOINTMENT (OUTPATIENT)
Dept: OBGYN | Facility: CLINIC | Age: 33
End: 2023-11-02
Payer: MEDICAID

## 2023-11-02 ENCOUNTER — OUTPATIENT (OUTPATIENT)
Dept: OUTPATIENT SERVICES | Facility: HOSPITAL | Age: 33
LOS: 1 days | End: 2023-11-02
Payer: MEDICAID

## 2023-11-02 VITALS — BODY MASS INDEX: 41.7 KG/M2 | WEIGHT: 228 LBS | DIASTOLIC BLOOD PRESSURE: 80 MMHG | SYSTOLIC BLOOD PRESSURE: 120 MMHG

## 2023-11-02 DIAGNOSIS — Z34.80 ENCOUNTER FOR SUPERVISION OF OTHER NORMAL PREGNANCY, UNSPECIFIED TRIMESTER: ICD-10-CM

## 2023-11-02 DIAGNOSIS — Z34.92 ENCOUNTER FOR SUPERVISION OF NORMAL PREGNANCY, UNSPECIFIED, SECOND TRIMESTER: ICD-10-CM

## 2023-11-02 DIAGNOSIS — K21.9 GASTRO-ESOPHAGEAL REFLUX DISEASE W/OUT ESOPHAGITIS: ICD-10-CM

## 2023-11-02 DIAGNOSIS — Z98.891 HISTORY OF UTERINE SCAR FROM PREVIOUS SURGERY: Chronic | ICD-10-CM

## 2023-11-02 DIAGNOSIS — K21.9 GASTRO-ESOPHAGEAL REFLUX DISEASE WITHOUT ESOPHAGITIS: ICD-10-CM

## 2023-11-02 DIAGNOSIS — Z98.890 OTHER SPECIFIED POSTPROCEDURAL STATES: Chronic | ICD-10-CM

## 2023-11-02 PROCEDURE — 99213 OFFICE O/P EST LOW 20 MIN: CPT | Mod: TH

## 2023-11-02 PROCEDURE — G0463: CPT

## 2023-11-19 ENCOUNTER — LABORATORY RESULT (OUTPATIENT)
Age: 33
End: 2023-11-19

## 2023-11-20 ENCOUNTER — APPOINTMENT (OUTPATIENT)
Dept: OBGYN | Facility: CLINIC | Age: 33
End: 2023-11-20
Payer: MEDICAID

## 2023-11-20 ENCOUNTER — NON-APPOINTMENT (OUTPATIENT)
Age: 33
End: 2023-11-20

## 2023-11-20 ENCOUNTER — APPOINTMENT (OUTPATIENT)
Dept: ANTEPARTUM | Facility: CLINIC | Age: 33
End: 2023-11-20
Payer: MEDICAID

## 2023-11-20 ENCOUNTER — OUTPATIENT (OUTPATIENT)
Dept: OUTPATIENT SERVICES | Facility: HOSPITAL | Age: 33
LOS: 1 days | End: 2023-11-20
Payer: MEDICAID

## 2023-11-20 ENCOUNTER — ASOB RESULT (OUTPATIENT)
Age: 33
End: 2023-11-20

## 2023-11-20 VITALS — SYSTOLIC BLOOD PRESSURE: 110 MMHG | DIASTOLIC BLOOD PRESSURE: 80 MMHG | BODY MASS INDEX: 41.7 KG/M2 | WEIGHT: 228 LBS

## 2023-11-20 DIAGNOSIS — Z34.80 ENCOUNTER FOR SUPERVISION OF OTHER NORMAL PREGNANCY, UNSPECIFIED TRIMESTER: ICD-10-CM

## 2023-11-20 DIAGNOSIS — Z98.891 HISTORY OF UTERINE SCAR FROM PREVIOUS SURGERY: Chronic | ICD-10-CM

## 2023-11-20 DIAGNOSIS — Z98.890 OTHER SPECIFIED POSTPROCEDURAL STATES: Chronic | ICD-10-CM

## 2023-11-20 DIAGNOSIS — R79.89 OTHER SPECIFIED ABNORMAL FINDINGS OF BLOOD CHEMISTRY: ICD-10-CM

## 2023-11-20 DIAGNOSIS — Z34.90 ENCOUNTER FOR SUPERVISION OF NORMAL PREGNANCY, UNSPECIFIED, UNSPECIFIED TRIMESTER: ICD-10-CM

## 2023-11-20 PROCEDURE — 99213 OFFICE O/P EST LOW 20 MIN: CPT | Mod: TH

## 2023-11-20 PROCEDURE — 76805 OB US >/= 14 WKS SNGL FETUS: CPT

## 2023-11-20 PROCEDURE — 84443 ASSAY THYROID STIM HORMONE: CPT

## 2023-11-20 PROCEDURE — 81003 URINALYSIS AUTO W/O SCOPE: CPT

## 2023-11-20 PROCEDURE — 82105 ALPHA-FETOPROTEIN SERUM: CPT

## 2023-11-20 PROCEDURE — G0463: CPT

## 2023-11-21 ENCOUNTER — APPOINTMENT (OUTPATIENT)
Dept: INTERNAL MEDICINE | Facility: CLINIC | Age: 33
End: 2023-11-21

## 2023-11-21 LAB
T4 FREE+ TSH PNL SERPL: 1.34 UIU/ML — SIGNIFICANT CHANGE UP (ref 0.27–4.2)
T4 FREE+ TSH PNL SERPL: 1.34 UIU/ML — SIGNIFICANT CHANGE UP (ref 0.27–4.2)

## 2023-11-24 LAB
AFP ADJ MOM SERPL: 0.7 — SIGNIFICANT CHANGE UP
AFP ADJ MOM SERPL: 0.7 — SIGNIFICANT CHANGE UP
AFP INTERP SERPL-IMP: SIGNIFICANT CHANGE UP
AFP SERPL-MCNC: 20.2 NG/ML — SIGNIFICANT CHANGE UP
AFP SERPL-MCNC: 20.2 NG/ML — SIGNIFICANT CHANGE UP
AGE AT DELIVERY: 31.6 YR — SIGNIFICANT CHANGE UP
AGE AT DELIVERY: 31.6 YR — SIGNIFICANT CHANGE UP
ALPHA FETOPROTEIN SERUM COMMENT: SIGNIFICANT CHANGE UP
ALPHA FETOPROTEIN SERUM COMMENT: SIGNIFICANT CHANGE UP
ALPHA FETOPROTEIN SERUM RESULTS: SIGNIFICANT CHANGE UP
ALPHA FETOPROTEIN SERUM RESULTS: SIGNIFICANT CHANGE UP
GA METHOD: SIGNIFICANT CHANGE UP
GA METHOD: SIGNIFICANT CHANGE UP
GA: 16.7 WEEKS — SIGNIFICANT CHANGE UP
GA: 16.7 WEEKS — SIGNIFICANT CHANGE UP
IDDM PATIENT QL: NO — SIGNIFICANT CHANGE UP
IDDM PATIENT QL: NO — SIGNIFICANT CHANGE UP
MULTIPLE PREGNANCY: NO — SIGNIFICANT CHANGE UP
MULTIPLE PREGNANCY: NO — SIGNIFICANT CHANGE UP
NEURAL TUBE DEFECT RISK FETUS: SIGNIFICANT CHANGE UP
NEURAL TUBE DEFECT RISK FETUS: SIGNIFICANT CHANGE UP
RACE AFP: SIGNIFICANT CHANGE UP
RACE AFP: SIGNIFICANT CHANGE UP

## 2023-11-29 ENCOUNTER — NON-APPOINTMENT (OUTPATIENT)
Age: 33
End: 2023-11-29

## 2023-12-17 ENCOUNTER — NON-APPOINTMENT (OUTPATIENT)
Age: 33
End: 2023-12-17

## 2023-12-18 ENCOUNTER — OUTPATIENT (OUTPATIENT)
Dept: OUTPATIENT SERVICES | Facility: HOSPITAL | Age: 33
LOS: 1 days | End: 2023-12-18
Payer: MEDICAID

## 2023-12-18 ENCOUNTER — LABORATORY RESULT (OUTPATIENT)
Age: 33
End: 2023-12-18

## 2023-12-18 ENCOUNTER — APPOINTMENT (OUTPATIENT)
Dept: OBGYN | Facility: CLINIC | Age: 33
End: 2023-12-18
Payer: MEDICAID

## 2023-12-18 ENCOUNTER — ASOB RESULT (OUTPATIENT)
Age: 33
End: 2023-12-18

## 2023-12-18 ENCOUNTER — APPOINTMENT (OUTPATIENT)
Dept: ANTEPARTUM | Facility: CLINIC | Age: 33
End: 2023-12-18
Payer: MEDICAID

## 2023-12-18 VITALS — WEIGHT: 224 LBS | BODY MASS INDEX: 40.97 KG/M2 | DIASTOLIC BLOOD PRESSURE: 74 MMHG | SYSTOLIC BLOOD PRESSURE: 112 MMHG

## 2023-12-18 DIAGNOSIS — Z98.891 HISTORY OF UTERINE SCAR FROM PREVIOUS SURGERY: Chronic | ICD-10-CM

## 2023-12-18 DIAGNOSIS — Z34.80 ENCOUNTER FOR SUPERVISION OF OTHER NORMAL PREGNANCY, UNSPECIFIED TRIMESTER: ICD-10-CM

## 2023-12-18 DIAGNOSIS — Z34.92 ENCOUNTER FOR SUPERVISION OF NORMAL PREGNANCY, UNSPECIFIED, SECOND TRIMESTER: ICD-10-CM

## 2023-12-18 DIAGNOSIS — Z98.890 OTHER SPECIFIED POSTPROCEDURAL STATES: Chronic | ICD-10-CM

## 2023-12-18 DIAGNOSIS — Z34.90 ENCOUNTER FOR SUPERVISION OF NORMAL PREGNANCY, UNSPECIFIED, UNSPECIFIED TRIMESTER: ICD-10-CM

## 2023-12-18 DIAGNOSIS — R39.9 UNSPECIFIED SYMPTOMS AND SIGNS INVOLVING THE GENITOURINARY SYSTEM: ICD-10-CM

## 2023-12-18 PROCEDURE — 99213 OFFICE O/P EST LOW 20 MIN: CPT | Mod: TH,25

## 2023-12-18 PROCEDURE — 81003 URINALYSIS AUTO W/O SCOPE: CPT

## 2023-12-18 PROCEDURE — 76811 OB US DETAILED SNGL FETUS: CPT

## 2023-12-18 PROCEDURE — G0463: CPT

## 2023-12-26 ENCOUNTER — ASOB RESULT (OUTPATIENT)
Age: 33
End: 2023-12-26

## 2023-12-26 ENCOUNTER — APPOINTMENT (OUTPATIENT)
Dept: ANTEPARTUM | Facility: CLINIC | Age: 33
End: 2023-12-26
Payer: MEDICAID

## 2023-12-26 PROCEDURE — 76816 OB US FOLLOW-UP PER FETUS: CPT

## 2024-01-16 ENCOUNTER — ASOB RESULT (OUTPATIENT)
Age: 34
End: 2024-01-16

## 2024-01-16 ENCOUNTER — OUTPATIENT (OUTPATIENT)
Dept: OUTPATIENT SERVICES | Facility: HOSPITAL | Age: 34
LOS: 1 days | End: 2024-01-16
Payer: MEDICAID

## 2024-01-16 ENCOUNTER — APPOINTMENT (OUTPATIENT)
Dept: ANTEPARTUM | Facility: CLINIC | Age: 34
End: 2024-01-16
Payer: MEDICAID

## 2024-01-16 ENCOUNTER — APPOINTMENT (OUTPATIENT)
Dept: OBGYN | Facility: CLINIC | Age: 34
End: 2024-01-16
Payer: MEDICAID

## 2024-01-16 VITALS — WEIGHT: 228 LBS | SYSTOLIC BLOOD PRESSURE: 120 MMHG | BODY MASS INDEX: 41.7 KG/M2 | DIASTOLIC BLOOD PRESSURE: 80 MMHG

## 2024-01-16 DIAGNOSIS — Z98.890 OTHER SPECIFIED POSTPROCEDURAL STATES: Chronic | ICD-10-CM

## 2024-01-16 DIAGNOSIS — Z98.891 HISTORY OF UTERINE SCAR FROM PREVIOUS SURGERY: Chronic | ICD-10-CM

## 2024-01-16 DIAGNOSIS — Z34.80 ENCOUNTER FOR SUPERVISION OF OTHER NORMAL PREGNANCY, UNSPECIFIED TRIMESTER: ICD-10-CM

## 2024-01-16 PROCEDURE — 76817 TRANSVAGINAL US OBSTETRIC: CPT

## 2024-01-16 PROCEDURE — 76816 OB US FOLLOW-UP PER FETUS: CPT

## 2024-01-16 PROCEDURE — G0463: CPT

## 2024-01-16 PROCEDURE — 99213 OFFICE O/P EST LOW 20 MIN: CPT | Mod: TH

## 2024-01-17 DIAGNOSIS — Z34.93 ENCOUNTER FOR SUPERVISION OF NORMAL PREGNANCY, UNSPECIFIED, THIRD TRIMESTER: ICD-10-CM

## 2024-01-30 ENCOUNTER — OUTPATIENT (OUTPATIENT)
Dept: OUTPATIENT SERVICES | Facility: HOSPITAL | Age: 34
LOS: 1 days | End: 2024-01-30
Payer: MEDICAID

## 2024-01-30 ENCOUNTER — APPOINTMENT (OUTPATIENT)
Dept: OBGYN | Facility: CLINIC | Age: 34
End: 2024-01-30
Payer: MEDICAID

## 2024-01-30 VITALS — SYSTOLIC BLOOD PRESSURE: 160 MMHG | WEIGHT: 210 LBS | DIASTOLIC BLOOD PRESSURE: 100 MMHG | BODY MASS INDEX: 38.41 KG/M2

## 2024-01-30 VITALS — WEIGHT: 224 LBS | SYSTOLIC BLOOD PRESSURE: 122 MMHG | DIASTOLIC BLOOD PRESSURE: 80 MMHG | BODY MASS INDEX: 40.97 KG/M2

## 2024-01-30 DIAGNOSIS — Z98.890 OTHER SPECIFIED POSTPROCEDURAL STATES: Chronic | ICD-10-CM

## 2024-01-30 DIAGNOSIS — Z98.891 HISTORY OF UTERINE SCAR FROM PREVIOUS SURGERY: Chronic | ICD-10-CM

## 2024-01-30 DIAGNOSIS — Z34.80 ENCOUNTER FOR SUPERVISION OF OTHER NORMAL PREGNANCY, UNSPECIFIED TRIMESTER: ICD-10-CM

## 2024-01-30 DIAGNOSIS — O99.810 ABNORMAL GLUCOSE COMPLICATING PREGNANCY: ICD-10-CM

## 2024-01-30 PROCEDURE — 81003 URINALYSIS AUTO W/O SCOPE: CPT

## 2024-01-30 PROCEDURE — 36415 COLL VENOUS BLD VENIPUNCTURE: CPT

## 2024-01-30 PROCEDURE — G0463: CPT

## 2024-01-30 PROCEDURE — 99213 OFFICE O/P EST LOW 20 MIN: CPT | Mod: TH,25

## 2024-01-31 DIAGNOSIS — O44.00 COMPLETE PLACENTA PREVIA NOS OR WITHOUT HEMORRHAGE, UNSPECIFIED TRIMESTER: ICD-10-CM

## 2024-02-06 ENCOUNTER — NON-APPOINTMENT (OUTPATIENT)
Age: 34
End: 2024-02-06

## 2024-02-07 ENCOUNTER — APPOINTMENT (OUTPATIENT)
Dept: ANTEPARTUM | Facility: CLINIC | Age: 34
End: 2024-02-07
Payer: MEDICAID

## 2024-02-07 ENCOUNTER — ASOB RESULT (OUTPATIENT)
Age: 34
End: 2024-02-07

## 2024-02-07 DIAGNOSIS — O43.109 MALFORMATION OF PLACENTA, UNSPECIFIED, UNSPECIFIED TRIMESTER: ICD-10-CM

## 2024-02-07 PROCEDURE — 76817 TRANSVAGINAL US OBSTETRIC: CPT

## 2024-02-07 PROCEDURE — 76816 OB US FOLLOW-UP PER FETUS: CPT

## 2024-02-08 ENCOUNTER — NON-APPOINTMENT (OUTPATIENT)
Age: 34
End: 2024-02-08

## 2024-02-11 ENCOUNTER — NON-APPOINTMENT (OUTPATIENT)
Age: 34
End: 2024-02-11

## 2024-02-14 ENCOUNTER — NON-APPOINTMENT (OUTPATIENT)
Age: 34
End: 2024-02-14

## 2024-02-16 ENCOUNTER — OUTPATIENT (OUTPATIENT)
Dept: OUTPATIENT SERVICES | Facility: HOSPITAL | Age: 34
LOS: 1 days | End: 2024-02-16
Payer: MEDICAID

## 2024-02-16 ENCOUNTER — LABORATORY RESULT (OUTPATIENT)
Age: 34
End: 2024-02-16

## 2024-02-16 ENCOUNTER — APPOINTMENT (OUTPATIENT)
Dept: MATERNAL FETAL MEDICINE | Facility: CLINIC | Age: 34
End: 2024-02-16
Payer: MEDICAID

## 2024-02-16 VITALS
OXYGEN SATURATION: 97 % | SYSTOLIC BLOOD PRESSURE: 110 MMHG | WEIGHT: 225 LBS | BODY MASS INDEX: 41.41 KG/M2 | DIASTOLIC BLOOD PRESSURE: 60 MMHG | HEIGHT: 62 IN | HEART RATE: 91 BPM

## 2024-02-16 DIAGNOSIS — O44.00 COMPLETE PLACENTA PREVIA NOS OR WITHOUT HEMORRHAGE, UNSPECIFIED TRIMESTER: ICD-10-CM

## 2024-02-16 DIAGNOSIS — Z98.891 HISTORY OF UTERINE SCAR FROM PREVIOUS SURGERY: Chronic | ICD-10-CM

## 2024-02-16 DIAGNOSIS — Z00.00 ENCOUNTER FOR GENERAL ADULT MEDICAL EXAMINATION WITHOUT ABNORMAL FINDINGS: ICD-10-CM

## 2024-02-16 LAB
BILIRUB UR QL STRIP: NORMAL
CLARITY UR: CLEAR
COLLECTION METHOD: NORMAL
GLUCOSE UR-MCNC: NORMAL
HCG UR QL: 0.2 EU/DL
HCT VFR BLD CALC: 35.7 % — SIGNIFICANT CHANGE UP (ref 34.5–45)
HGB BLD-MCNC: 12 G/DL — SIGNIFICANT CHANGE UP (ref 11.5–15.5)
HGB UR QL STRIP.AUTO: NORMAL
KETONES UR-MCNC: NORMAL
LEUKOCYTE ESTERASE UR QL STRIP: NORMAL
MCHC RBC-ENTMCNC: 28.7 PG — SIGNIFICANT CHANGE UP (ref 27–34)
MCHC RBC-ENTMCNC: 33.6 GM/DL — SIGNIFICANT CHANGE UP (ref 32–36)
MCV RBC AUTO: 85.4 FL — SIGNIFICANT CHANGE UP (ref 80–100)
NITRITE UR QL STRIP: NORMAL
PH UR STRIP: 6
PLATELET # BLD AUTO: 161 K/UL — SIGNIFICANT CHANGE UP (ref 150–400)
PROT UR STRIP-MCNC: NORMAL
RBC # BLD: 4.18 M/UL — SIGNIFICANT CHANGE UP (ref 3.8–5.2)
RBC # FLD: 14.5 % — SIGNIFICANT CHANGE UP (ref 10.3–14.5)
SP GR UR STRIP: 1.03
T PALLIDUM AB TITR SER: NEGATIVE — SIGNIFICANT CHANGE UP
WBC # BLD: 6.17 K/UL — SIGNIFICANT CHANGE UP (ref 3.8–10.5)
WBC # FLD AUTO: 6.17 K/UL — SIGNIFICANT CHANGE UP (ref 3.8–10.5)

## 2024-02-16 PROCEDURE — 86850 RBC ANTIBODY SCREEN: CPT

## 2024-02-16 PROCEDURE — 90715 TDAP VACCINE 7 YRS/> IM: CPT

## 2024-02-16 PROCEDURE — 85027 COMPLETE CBC AUTOMATED: CPT

## 2024-02-16 PROCEDURE — 36415 COLL VENOUS BLD VENIPUNCTURE: CPT

## 2024-02-16 PROCEDURE — 86780 TREPONEMA PALLIDUM: CPT

## 2024-02-16 PROCEDURE — 99213 OFFICE O/P EST LOW 20 MIN: CPT | Mod: TH,25

## 2024-02-16 PROCEDURE — 86900 BLOOD TYPING SEROLOGIC ABO: CPT

## 2024-02-16 PROCEDURE — 90471 IMMUNIZATION ADMIN: CPT

## 2024-02-16 PROCEDURE — G0463: CPT | Mod: 25

## 2024-02-21 ENCOUNTER — APPOINTMENT (OUTPATIENT)
Dept: MRI IMAGING | Facility: CLINIC | Age: 34
End: 2024-02-21
Payer: MEDICAID

## 2024-02-21 ENCOUNTER — OUTPATIENT (OUTPATIENT)
Dept: OUTPATIENT SERVICES | Facility: HOSPITAL | Age: 34
LOS: 1 days | End: 2024-02-21

## 2024-02-21 ENCOUNTER — RESULT REVIEW (OUTPATIENT)
Age: 34
End: 2024-02-21

## 2024-02-21 ENCOUNTER — OUTPATIENT (OUTPATIENT)
Dept: OUTPATIENT SERVICES | Facility: HOSPITAL | Age: 34
LOS: 1 days | End: 2024-02-21
Payer: MEDICAID

## 2024-02-21 DIAGNOSIS — O43.109 MALFORMATION OF PLACENTA, UNSPECIFIED, UNSPECIFIED TRIMESTER: ICD-10-CM

## 2024-02-21 DIAGNOSIS — Z98.891 HISTORY OF UTERINE SCAR FROM PREVIOUS SURGERY: Chronic | ICD-10-CM

## 2024-02-21 DIAGNOSIS — Z98.890 OTHER SPECIFIED POSTPROCEDURAL STATES: Chronic | ICD-10-CM

## 2024-02-21 DIAGNOSIS — Z00.8 ENCOUNTER FOR OTHER GENERAL EXAMINATION: ICD-10-CM

## 2024-02-21 PROCEDURE — 72195 MRI PELVIS W/O DYE: CPT | Mod: 26

## 2024-02-21 PROCEDURE — 72195 MRI PELVIS W/O DYE: CPT

## 2024-03-08 ENCOUNTER — APPOINTMENT (OUTPATIENT)
Dept: MATERNAL FETAL MEDICINE | Facility: CLINIC | Age: 34
End: 2024-03-08
Payer: MEDICAID

## 2024-03-08 ENCOUNTER — APPOINTMENT (OUTPATIENT)
Dept: ANTEPARTUM | Facility: CLINIC | Age: 34
End: 2024-03-08
Payer: MEDICAID

## 2024-03-08 ENCOUNTER — ASOB RESULT (OUTPATIENT)
Age: 34
End: 2024-03-08

## 2024-03-08 ENCOUNTER — OUTPATIENT (OUTPATIENT)
Dept: OUTPATIENT SERVICES | Facility: HOSPITAL | Age: 34
LOS: 1 days | End: 2024-03-08
Payer: MEDICAID

## 2024-03-08 ENCOUNTER — NON-APPOINTMENT (OUTPATIENT)
Age: 34
End: 2024-03-08

## 2024-03-08 VITALS
OXYGEN SATURATION: 98 % | DIASTOLIC BLOOD PRESSURE: 70 MMHG | BODY MASS INDEX: 41.98 KG/M2 | HEIGHT: 62 IN | WEIGHT: 228.13 LBS | SYSTOLIC BLOOD PRESSURE: 122 MMHG | HEART RATE: 88 BPM

## 2024-03-08 DIAGNOSIS — O09.899 SUPERVISION OF OTHER HIGH RISK PREGNANCIES, UNSPECIFIED TRIMESTER: ICD-10-CM

## 2024-03-08 DIAGNOSIS — Z98.890 OTHER SPECIFIED POSTPROCEDURAL STATES: Chronic | ICD-10-CM

## 2024-03-08 LAB
BILIRUB UR QL STRIP: NORMAL
CLARITY UR: CLEAR
COLLECTION METHOD: NORMAL
GLUCOSE UR-MCNC: NORMAL
HCG UR QL: 0.2 EU/DL
HGB UR QL STRIP.AUTO: NORMAL
KETONES UR-MCNC: NORMAL
LEUKOCYTE ESTERASE UR QL STRIP: NORMAL
NITRITE UR QL STRIP: NORMAL
PH UR STRIP: 7
PROT UR STRIP-MCNC: NORMAL
SP GR UR STRIP: 1.02

## 2024-03-08 PROCEDURE — 76816 OB US FOLLOW-UP PER FETUS: CPT

## 2024-03-08 PROCEDURE — 99213 OFFICE O/P EST LOW 20 MIN: CPT | Mod: TH,GC,25

## 2024-03-08 PROCEDURE — 76817 TRANSVAGINAL US OBSTETRIC: CPT

## 2024-03-08 PROCEDURE — 76816 OB US FOLLOW-UP PER FETUS: CPT | Mod: 26

## 2024-03-08 PROCEDURE — 76817 TRANSVAGINAL US OBSTETRIC: CPT | Mod: 26

## 2024-03-11 ENCOUNTER — NON-APPOINTMENT (OUTPATIENT)
Age: 34
End: 2024-03-11

## 2024-03-15 ENCOUNTER — LABORATORY RESULT (OUTPATIENT)
Age: 34
End: 2024-03-15

## 2024-03-15 ENCOUNTER — APPOINTMENT (OUTPATIENT)
Dept: OTHER | Facility: CLINIC | Age: 34
End: 2024-03-15
Payer: MEDICAID

## 2024-03-15 ENCOUNTER — APPOINTMENT (OUTPATIENT)
Dept: MATERNAL FETAL MEDICINE | Facility: CLINIC | Age: 34
End: 2024-03-15
Payer: MEDICAID

## 2024-03-15 ENCOUNTER — OUTPATIENT (OUTPATIENT)
Dept: OUTPATIENT SERVICES | Facility: HOSPITAL | Age: 34
LOS: 1 days | End: 2024-03-15
Payer: MEDICAID

## 2024-03-15 VITALS
SYSTOLIC BLOOD PRESSURE: 108 MMHG | OXYGEN SATURATION: 99 % | DIASTOLIC BLOOD PRESSURE: 70 MMHG | HEART RATE: 98 BPM | BODY MASS INDEX: 41.68 KG/M2 | WEIGHT: 226.5 LBS | HEIGHT: 62 IN

## 2024-03-15 DIAGNOSIS — O99.210 OBESITY COMPLICATING PREGNANCY, UNSPECIFIED TRIMESTER: ICD-10-CM

## 2024-03-15 DIAGNOSIS — Z98.891 HISTORY OF UTERINE SCAR FROM PREVIOUS SURGERY: Chronic | ICD-10-CM

## 2024-03-15 DIAGNOSIS — O44.03 COMPLETE PLACENTA PREVIA NOS OR WITHOUT HEMORRHAGE, THIRD TRIMESTER: ICD-10-CM

## 2024-03-15 DIAGNOSIS — O09.93 SUPERVISION OF HIGH RISK PREGNANCY, UNSPECIFIED, THIRD TRIMESTER: ICD-10-CM

## 2024-03-15 DIAGNOSIS — Z98.890 OTHER SPECIFIED POSTPROCEDURAL STATES: Chronic | ICD-10-CM

## 2024-03-15 DIAGNOSIS — O09.899 SUPERVISION OF OTHER HIGH RISK PREGNANCIES, UNSPECIFIED TRIMESTER: ICD-10-CM

## 2024-03-15 LAB
BILIRUB UR QL STRIP: NORMAL
CLARITY UR: CLEAR
COLLECTION METHOD: NORMAL
GLUCOSE UR-MCNC: NORMAL
HCG UR QL: 0.2 EU/DL
HCT VFR BLD CALC: 37.4 % — SIGNIFICANT CHANGE UP (ref 34.5–45)
HGB BLD-MCNC: 12 G/DL — SIGNIFICANT CHANGE UP (ref 11.5–15.5)
HGB UR QL STRIP.AUTO: NORMAL
KETONES UR-MCNC: NORMAL
LEUKOCYTE ESTERASE UR QL STRIP: NORMAL
MCHC RBC-ENTMCNC: 28.8 PG — SIGNIFICANT CHANGE UP (ref 27–34)
MCHC RBC-ENTMCNC: 32.1 GM/DL — SIGNIFICANT CHANGE UP (ref 32–36)
MCV RBC AUTO: 89.7 FL — SIGNIFICANT CHANGE UP (ref 80–100)
NITRITE UR QL STRIP: NORMAL
PH UR STRIP: 6.5
PLATELET # BLD AUTO: 174 K/UL — SIGNIFICANT CHANGE UP (ref 150–400)
PROT UR STRIP-MCNC: 30
RBC # BLD: 4.17 M/UL — SIGNIFICANT CHANGE UP (ref 3.8–5.2)
RBC # FLD: 14.6 % — HIGH (ref 10.3–14.5)
SP GR UR STRIP: 1.03
WBC # BLD: 6.86 K/UL — SIGNIFICANT CHANGE UP (ref 3.8–10.5)
WBC # FLD AUTO: 6.86 K/UL — SIGNIFICANT CHANGE UP (ref 3.8–10.5)

## 2024-03-15 PROCEDURE — 99443: CPT

## 2024-03-15 PROCEDURE — 87389 HIV-1 AG W/HIV-1&-2 AB AG IA: CPT

## 2024-03-15 PROCEDURE — 86787 VARICELLA-ZOSTER ANTIBODY: CPT

## 2024-03-15 PROCEDURE — G0463: CPT

## 2024-03-15 PROCEDURE — 81003 URINALYSIS AUTO W/O SCOPE: CPT

## 2024-03-15 PROCEDURE — 86706 HEP B SURFACE ANTIBODY: CPT

## 2024-03-15 PROCEDURE — 86780 TREPONEMA PALLIDUM: CPT

## 2024-03-15 PROCEDURE — 87653 STREP B DNA AMP PROBE: CPT

## 2024-03-15 PROCEDURE — 36415 COLL VENOUS BLD VENIPUNCTURE: CPT

## 2024-03-15 PROCEDURE — 85027 COMPLETE CBC AUTOMATED: CPT

## 2024-03-15 PROCEDURE — 99213 OFFICE O/P EST LOW 20 MIN: CPT | Mod: TH,GC,25

## 2024-03-16 LAB
GROUP B BETA STREP DNA (PCR): DETECTED
HBV SURFACE AB SER-ACNC: REACTIVE
HIV 1+2 AB+HIV1 P24 AG SERPL QL IA: SIGNIFICANT CHANGE UP
SOURCE GROUP B STREP: SIGNIFICANT CHANGE UP
T PALLIDUM AB TITR SER: NEGATIVE — SIGNIFICANT CHANGE UP
VZV IGG FLD QL IA: 596.7 INDEX — SIGNIFICANT CHANGE UP
VZV IGG FLD QL IA: POSITIVE — SIGNIFICANT CHANGE UP

## 2024-03-18 ENCOUNTER — NON-APPOINTMENT (OUTPATIENT)
Age: 34
End: 2024-03-18

## 2024-03-18 DIAGNOSIS — O99.213 OBESITY COMPLICATING PREGNANCY, THIRD TRIMESTER: ICD-10-CM

## 2024-03-18 DIAGNOSIS — Z36.4 ENCOUNTER FOR ANTENATAL SCREENING FOR FETAL GROWTH RETARDATION: ICD-10-CM

## 2024-03-18 DIAGNOSIS — Z3A.32 32 WEEKS GESTATION OF PREGNANCY: ICD-10-CM

## 2024-03-18 DIAGNOSIS — O44.03 COMPLETE PLACENTA PREVIA NOS OR WITHOUT HEMORRHAGE, THIRD TRIMESTER: ICD-10-CM

## 2024-03-18 DIAGNOSIS — O34.219 MATERNAL CARE FOR UNSPECIFIED TYPE SCAR FROM PREVIOUS CESAREAN DELIVERY: ICD-10-CM

## 2024-03-19 ENCOUNTER — OUTPATIENT (OUTPATIENT)
Dept: OUTPATIENT SERVICES | Facility: HOSPITAL | Age: 34
LOS: 1 days | End: 2024-03-19
Payer: MEDICAID

## 2024-03-19 ENCOUNTER — NON-APPOINTMENT (OUTPATIENT)
Age: 34
End: 2024-03-19

## 2024-03-19 ENCOUNTER — APPOINTMENT (OUTPATIENT)
Dept: UROLOGY | Facility: CLINIC | Age: 34
End: 2024-03-19
Payer: MEDICAID

## 2024-03-19 VITALS
HEART RATE: 100 BPM | WEIGHT: 226.64 LBS | OXYGEN SATURATION: 95 % | SYSTOLIC BLOOD PRESSURE: 111 MMHG | HEIGHT: 62 IN | TEMPERATURE: 98 F | DIASTOLIC BLOOD PRESSURE: 78 MMHG | RESPIRATION RATE: 18 BRPM

## 2024-03-19 DIAGNOSIS — Z98.890 OTHER SPECIFIED POSTPROCEDURAL STATES: Chronic | ICD-10-CM

## 2024-03-19 DIAGNOSIS — Z91.89 OTHER SPECIFIED PERSONAL RISK FACTORS, NOT ELSEWHERE CLASSIFIED: ICD-10-CM

## 2024-03-19 DIAGNOSIS — Z90.49 ACQUIRED ABSENCE OF OTHER SPECIFIED PARTS OF DIGESTIVE TRACT: Chronic | ICD-10-CM

## 2024-03-19 DIAGNOSIS — Z98.891 HISTORY OF UTERINE SCAR FROM PREVIOUS SURGERY: Chronic | ICD-10-CM

## 2024-03-19 DIAGNOSIS — O44.03 COMPLETE PLACENTA PREVIA NOS OR WITHOUT HEMORRHAGE, THIRD TRIMESTER: ICD-10-CM

## 2024-03-19 DIAGNOSIS — Z01.818 ENCOUNTER FOR OTHER PREPROCEDURAL EXAMINATION: ICD-10-CM

## 2024-03-19 LAB
ANION GAP SERPL CALC-SCNC: 15 MMOL/L — SIGNIFICANT CHANGE UP (ref 5–17)
BLD GP AB SCN SERPL QL: NEGATIVE — SIGNIFICANT CHANGE UP
BUN SERPL-MCNC: 6 MG/DL — LOW (ref 7–23)
CALCIUM SERPL-MCNC: 9.1 MG/DL — SIGNIFICANT CHANGE UP (ref 8.4–10.5)
CHLORIDE SERPL-SCNC: 103 MMOL/L — SIGNIFICANT CHANGE UP (ref 96–108)
CO2 SERPL-SCNC: 20 MMOL/L — LOW (ref 22–31)
CREAT SERPL-MCNC: 0.45 MG/DL — LOW (ref 0.5–1.3)
EGFR: 132 ML/MIN/1.73M2 — SIGNIFICANT CHANGE UP
GLUCOSE SERPL-MCNC: 86 MG/DL — SIGNIFICANT CHANGE UP (ref 70–99)
POTASSIUM SERPL-MCNC: 4 MMOL/L — SIGNIFICANT CHANGE UP (ref 3.5–5.3)
POTASSIUM SERPL-SCNC: 4 MMOL/L — SIGNIFICANT CHANGE UP (ref 3.5–5.3)
RH IG SCN BLD-IMP: POSITIVE — SIGNIFICANT CHANGE UP
SODIUM SERPL-SCNC: 138 MMOL/L — SIGNIFICANT CHANGE UP (ref 135–145)

## 2024-03-19 PROCEDURE — 87086 URINE CULTURE/COLONY COUNT: CPT

## 2024-03-19 PROCEDURE — 80048 BASIC METABOLIC PNL TOTAL CA: CPT

## 2024-03-19 PROCEDURE — 86901 BLOOD TYPING SEROLOGIC RH(D): CPT

## 2024-03-19 PROCEDURE — 86850 RBC ANTIBODY SCREEN: CPT

## 2024-03-19 PROCEDURE — 99442: CPT

## 2024-03-19 PROCEDURE — 86900 BLOOD TYPING SEROLOGIC ABO: CPT

## 2024-03-19 PROCEDURE — G0463: CPT

## 2024-03-19 RX ORDER — CHLORHEXIDINE GLUCONATE 213 G/1000ML
1 SOLUTION TOPICAL DAILY
Refills: 0 | Status: DISCONTINUED | OUTPATIENT
Start: 2024-03-26 | End: 2024-03-26

## 2024-03-19 NOTE — OB PST NOTE - FALL HARM RISK - RISK INTERVENTIONS
Assistance OOB with selected safe patient handling equipment/Assistance with ambulation/Communicate Fall Risk and Risk Factors to all staff, patient, and family/Monitor gait and stability/Reinforce activity limits and safety measures with patient and family/Sit up slowly, dangle for a short time, stand at bedside before walking/Use of alarms - bed, chair and/or voice tab/Visual Cue: Yellow wristband/Bed in lowest position, wheels locked, appropriate side rails in place/Call bell, personal items and telephone in reach/Instruct patient to call for assistance before getting out of bed or chair/Non-slip footwear when patient is out of bed/[place] to call system/Physically safe environment - no spills, clutter or unnecessary equipment/Purposeful Proactive Rounding/Room/bathroom lighting operational, light cord in reach

## 2024-03-19 NOTE — ASSESSMENT
[FreeTextEntry1] : Discussed placement of ureteral catheters. The reason for this is to allow for identification of the ureters by the surgeon at the time of primary procedure. It is not uncommon for the location of the ureters to become distorted when there is abnormal pelvic pathology. While studies have not necessarily shown a decrease in ureteral injury with catheter placement, it does seem that placement of catheters increases the likelihood of recognizing an injury at the time which allows for immediate correction.

## 2024-03-19 NOTE — OB PST NOTE - LAST STRESS TEST
Baby is a 39.4 week  AGA male born via C/S to a 34 y/o   mother. No significant maternal or prenatal history. Maternal labs include blood type A+ , HIV Ag/Ab nonreactive, RPR nonreactive, rubella immune, HBsAg negative, GBS - on  . ROM at delivery with clear fluids .  Baby emerged vigorous, crying, was w/d/s/s with APGARS of 8/9. Resuscitation included: tactile stimulation and bulb suction. Mom plans to initiate breastfeeding , declines Hep B vaccine and declines circ.      : 2024  TOB: 0813  Weight: 3340 g Baby is a 39.4 week  AGA male born via C/S to a 36 y/o   mother. No significant maternal or prenatal history. Maternal labs include blood type A+ , HIV Ag/Ab nonreactive, RPR nonreactive, rubella immune, HBsAg negative, GBS - on  . ROM at delivery with clear fluids .  Baby emerged vigorous, crying, was w/d/s/s with APGARS of 8/9. Infant had uneventful hospital course. Mom breastfeeding with supplementation    General: Alert, awake NAD  HEENT: NCAT AFOF red reflex present b/l  Heart: + S1 S2 RRR  Lungs: Clear  Abd: Soft, nondistended  : normal male , moderate hydrocele b/l  Neuro: + Doreen, + grasp  Back: normal, no sacral dimple    Well   Routine  care  Discharge to home  followup in our office 1 day after discharge n/a

## 2024-03-19 NOTE — OB PST NOTE - HISTORY OF PRESENT ILLNESS
34yo   32yo  female with PMH of previous C-sections, obesity, gallstones, and Gerd. She presents today for evaluation prior to planned repeat  and Bilateral Tubal Ligation on 3/26/24 for suspected Placenta Accreta. Patient denies any swelling, pain, cramping or bleeding today.

## 2024-03-19 NOTE — OB PST NOTE - PROBLEM SELECTOR PLAN 1
Repeat  Section, Bilateral Tubal Ligation 3/26/24  -Pepcid DOS if needed.  -Pt to confirm with OB on when to stop Aspirin.

## 2024-03-19 NOTE — HISTORY OF PRESENT ILLNESS
[FreeTextEntry1] : 32yo female with cc of placenta previa. Pt with hx of 2 prior deliveries both via . For first it was done after SRPOM without dilation. Second was planned section due to baby size. Pt with episode of vaginal bleeding in Dec and noted to have previa. She is almost 34w. Planned section on Tues 3/26. Consented for possible hysterectomy.

## 2024-03-19 NOTE — OB PST NOTE - NSICDXPASTMEDICALHX_GEN_ALL_CORE_FT
PAST MEDICAL HISTORY:  Cholelithiases     GERD (gastroesophageal reflux disease)     Herniated disc, cervical pt has range of motion without difficulties    Lumbar back pain chronic, on and off    Morbid obesity BMI 44.1 today, 12/29/20     PAST MEDICAL HISTORY:  Cholelithiases     GERD (gastroesophageal reflux disease)     Herniated disc, cervical pt has range of motion without difficulties    Lumbar back pain chronic, on and off    Obesity

## 2024-03-20 ENCOUNTER — APPOINTMENT (OUTPATIENT)
Dept: ANTEPARTUM | Facility: CLINIC | Age: 34
End: 2024-03-20
Payer: MEDICAID

## 2024-03-20 ENCOUNTER — ASOB RESULT (OUTPATIENT)
Age: 34
End: 2024-03-20

## 2024-03-20 PROBLEM — K21.9 GASTRO-ESOPHAGEAL REFLUX DISEASE WITHOUT ESOPHAGITIS: Chronic | Status: ACTIVE | Noted: 2024-03-19

## 2024-03-20 PROBLEM — E66.9 OBESITY, UNSPECIFIED: Chronic | Status: ACTIVE | Noted: 2024-03-19

## 2024-03-20 LAB
CULTURE RESULTS: SIGNIFICANT CHANGE UP
SPECIMEN SOURCE: SIGNIFICANT CHANGE UP

## 2024-03-20 PROCEDURE — 99367 TEAM CONF W/O PAT BY PHYS: CPT

## 2024-03-21 ENCOUNTER — NON-APPOINTMENT (OUTPATIENT)
Age: 34
End: 2024-03-21

## 2024-03-22 ENCOUNTER — ASOB RESULT (OUTPATIENT)
Age: 34
End: 2024-03-22

## 2024-03-22 ENCOUNTER — OUTPATIENT (OUTPATIENT)
Dept: OUTPATIENT SERVICES | Facility: HOSPITAL | Age: 34
LOS: 1 days | End: 2024-03-22
Payer: MEDICAID

## 2024-03-22 ENCOUNTER — APPOINTMENT (OUTPATIENT)
Dept: ANTEPARTUM | Facility: CLINIC | Age: 34
End: 2024-03-22
Payer: MEDICAID

## 2024-03-22 ENCOUNTER — APPOINTMENT (OUTPATIENT)
Dept: MATERNAL FETAL MEDICINE | Facility: CLINIC | Age: 34
End: 2024-03-22
Payer: MEDICAID

## 2024-03-22 VITALS
OXYGEN SATURATION: 98 % | BODY MASS INDEX: 41.64 KG/M2 | HEIGHT: 62 IN | HEART RATE: 97 BPM | DIASTOLIC BLOOD PRESSURE: 64 MMHG | WEIGHT: 226.25 LBS | SYSTOLIC BLOOD PRESSURE: 102 MMHG

## 2024-03-22 DIAGNOSIS — Z90.49 ACQUIRED ABSENCE OF OTHER SPECIFIED PARTS OF DIGESTIVE TRACT: Chronic | ICD-10-CM

## 2024-03-22 DIAGNOSIS — O99.213 OBESITY COMPLICATING PREGNANCY, THIRD TRIMESTER: ICD-10-CM

## 2024-03-22 DIAGNOSIS — O44.03 COMPLETE PLACENTA PREVIA NOS OR WITHOUT HEMORRHAGE, THIRD TRIMESTER: ICD-10-CM

## 2024-03-22 DIAGNOSIS — Z36.4 ENCOUNTER FOR ANTENATAL SCREENING FOR FETAL GROWTH RETARDATION: ICD-10-CM

## 2024-03-22 DIAGNOSIS — O09.899 SUPERVISION OF OTHER HIGH RISK PREGNANCIES, UNSPECIFIED TRIMESTER: ICD-10-CM

## 2024-03-22 DIAGNOSIS — Z98.891 HISTORY OF UTERINE SCAR FROM PREVIOUS SURGERY: Chronic | ICD-10-CM

## 2024-03-22 DIAGNOSIS — Z98.890 OTHER SPECIFIED POSTPROCEDURAL STATES: Chronic | ICD-10-CM

## 2024-03-22 DIAGNOSIS — O09.93 SUPERVISION OF HIGH RISK PREGNANCY, UNSPECIFIED, THIRD TRIMESTER: ICD-10-CM

## 2024-03-22 DIAGNOSIS — Z3A.34 34 WEEKS GESTATION OF PREGNANCY: ICD-10-CM

## 2024-03-22 DIAGNOSIS — O34.219 MATERNAL CARE FOR UNSPECIFIED TYPE SCAR FROM PREVIOUS CESAREAN DELIVERY: ICD-10-CM

## 2024-03-22 LAB
BILIRUB UR QL STRIP: NORMAL
COLLECTION METHOD: NORMAL
GLUCOSE UR-MCNC: NORMAL
HCG UR QL: 0.2 EU/DL
HGB UR QL STRIP.AUTO: NORMAL
KETONES UR-MCNC: NORMAL
LEUKOCYTE ESTERASE UR QL STRIP: NORMAL
NITRITE UR QL STRIP: NORMAL
PH UR STRIP: 6
PROT UR STRIP-MCNC: NORMAL
SP GR UR STRIP: 1.02

## 2024-03-22 PROCEDURE — 99213 OFFICE O/P EST LOW 20 MIN: CPT | Mod: TH,25,GC

## 2024-03-22 PROCEDURE — 76816 OB US FOLLOW-UP PER FETUS: CPT

## 2024-03-22 PROCEDURE — 76816 OB US FOLLOW-UP PER FETUS: CPT | Mod: 26

## 2024-03-22 PROCEDURE — G0463: CPT | Mod: 25

## 2024-03-22 PROCEDURE — 81003 URINALYSIS AUTO W/O SCOPE: CPT

## 2024-03-25 ENCOUNTER — TRANSCRIPTION ENCOUNTER (OUTPATIENT)
Age: 34
End: 2024-03-25

## 2024-03-25 LAB — BACTERIA UR CULT: NORMAL

## 2024-03-26 ENCOUNTER — APPOINTMENT (OUTPATIENT)
Dept: OBGYN | Facility: CLINIC | Age: 34
End: 2024-03-26

## 2024-03-26 ENCOUNTER — INPATIENT (INPATIENT)
Facility: HOSPITAL | Age: 34
LOS: 3 days | Discharge: HOME | End: 2024-03-30
Attending: OBSTETRICS & GYNECOLOGY | Admitting: OBSTETRICS & GYNECOLOGY
Payer: MEDICAID

## 2024-03-26 VITALS
DIASTOLIC BLOOD PRESSURE: 74 MMHG | RESPIRATION RATE: 18 BRPM | HEIGHT: 62.01 IN | HEART RATE: 102 BPM | OXYGEN SATURATION: 100 % | TEMPERATURE: 99 F | WEIGHT: 226.64 LBS | SYSTOLIC BLOOD PRESSURE: 110 MMHG

## 2024-03-26 DIAGNOSIS — Z98.890 OTHER SPECIFIED POSTPROCEDURAL STATES: Chronic | ICD-10-CM

## 2024-03-26 DIAGNOSIS — Z90.49 ACQUIRED ABSENCE OF OTHER SPECIFIED PARTS OF DIGESTIVE TRACT: Chronic | ICD-10-CM

## 2024-03-26 DIAGNOSIS — Z98.891 HISTORY OF UTERINE SCAR FROM PREVIOUS SURGERY: Chronic | ICD-10-CM

## 2024-03-26 DIAGNOSIS — O44.03 COMPLETE PLACENTA PREVIA NOS OR WITHOUT HEMORRHAGE, THIRD TRIMESTER: ICD-10-CM

## 2024-03-26 LAB
APTT BLD: 25.4 SEC — SIGNIFICANT CHANGE UP (ref 24.5–35.6)
BASOPHILS # BLD AUTO: 0.02 K/UL — SIGNIFICANT CHANGE UP (ref 0–0.2)
BASOPHILS NFR BLD AUTO: 0.1 % — SIGNIFICANT CHANGE UP (ref 0–2)
EOSINOPHIL # BLD AUTO: 0 K/UL — SIGNIFICANT CHANGE UP (ref 0–0.5)
EOSINOPHIL NFR BLD AUTO: 0 % — SIGNIFICANT CHANGE UP (ref 0–6)
FIBRINOGEN PPP-MCNC: 367 MG/DL — SIGNIFICANT CHANGE UP (ref 200–445)
HCT VFR BLD CALC: 32.2 % — LOW (ref 34.5–45)
HGB BLD-MCNC: 10.9 G/DL — LOW (ref 11.5–15.5)
IMM GRANULOCYTES NFR BLD AUTO: 0.7 % — SIGNIFICANT CHANGE UP (ref 0–0.9)
INR BLD: 1.12 RATIO — SIGNIFICANT CHANGE UP (ref 0.85–1.18)
LYMPHOCYTES # BLD AUTO: 0.85 K/UL — LOW (ref 1–3.3)
LYMPHOCYTES # BLD AUTO: 5 % — LOW (ref 13–44)
MCHC RBC-ENTMCNC: 28.2 PG — SIGNIFICANT CHANGE UP (ref 27–34)
MCHC RBC-ENTMCNC: 33.9 GM/DL — SIGNIFICANT CHANGE UP (ref 32–36)
MCV RBC AUTO: 83.4 FL — SIGNIFICANT CHANGE UP (ref 80–100)
MONOCYTES # BLD AUTO: 0.45 K/UL — SIGNIFICANT CHANGE UP (ref 0–0.9)
MONOCYTES NFR BLD AUTO: 2.7 % — SIGNIFICANT CHANGE UP (ref 2–14)
NEUTROPHILS # BLD AUTO: 15.42 K/UL — HIGH (ref 1.8–7.4)
NEUTROPHILS NFR BLD AUTO: 91.5 % — HIGH (ref 43–77)
NRBC # BLD: 0 /100 WBCS — SIGNIFICANT CHANGE UP (ref 0–0)
PLATELET # BLD AUTO: 174 K/UL — SIGNIFICANT CHANGE UP (ref 150–400)
PROTHROM AB SERPL-ACNC: 11.7 SEC — SIGNIFICANT CHANGE UP (ref 9.5–13)
RBC # BLD: 3.86 M/UL — SIGNIFICANT CHANGE UP (ref 3.8–5.2)
RBC # FLD: 13.6 % — SIGNIFICANT CHANGE UP (ref 10.3–14.5)
WBC # BLD: 16.86 K/UL — HIGH (ref 3.8–10.5)
WBC # FLD AUTO: 16.86 K/UL — HIGH (ref 3.8–10.5)

## 2024-03-26 PROCEDURE — 88302 TISSUE EXAM BY PATHOLOGIST: CPT | Mod: 26

## 2024-03-26 PROCEDURE — 52005 CYSTO W/URTRL CATHJ: CPT

## 2024-03-26 PROCEDURE — 88307 TISSUE EXAM BY PATHOLOGIST: CPT | Mod: 26

## 2024-03-26 PROCEDURE — 59514 CESAREAN DELIVERY ONLY: CPT | Mod: U7,GC

## 2024-03-26 DEVICE — INTERCEED 5 X 6" XL: Type: IMPLANTABLE DEVICE | Site: BILATERAL | Status: FUNCTIONAL

## 2024-03-26 DEVICE — URETERAL CATH WHISTLE TIP 5FR LEFT: Type: IMPLANTABLE DEVICE | Site: BILATERAL | Status: FUNCTIONAL

## 2024-03-26 DEVICE — KIT A-LINE 1LUM 20G X 12CM SAFE KIT: Type: IMPLANTABLE DEVICE | Site: BILATERAL | Status: FUNCTIONAL

## 2024-03-26 DEVICE — GUIDEWIRE SENSOR DUAL-FLEX NITINOL STRAIGHT .035" X 150CM: Type: IMPLANTABLE DEVICE | Site: BILATERAL | Status: FUNCTIONAL

## 2024-03-26 RX ORDER — OXYCODONE HYDROCHLORIDE 5 MG/1
5 TABLET ORAL
Refills: 0 | Status: DISCONTINUED | OUTPATIENT
Start: 2024-03-26 | End: 2024-03-27

## 2024-03-26 RX ORDER — CEFAZOLIN SODIUM 1 G
2000 VIAL (EA) INJECTION ONCE
Refills: 0 | Status: COMPLETED | OUTPATIENT
Start: 2024-03-26 | End: 2024-03-26

## 2024-03-26 RX ORDER — NALBUPHINE HYDROCHLORIDE 10 MG/ML
2.5 INJECTION, SOLUTION INTRAMUSCULAR; INTRAVENOUS; SUBCUTANEOUS EVERY 6 HOURS
Refills: 0 | Status: DISCONTINUED | OUTPATIENT
Start: 2024-03-26 | End: 2024-03-27

## 2024-03-26 RX ORDER — IBUPROFEN 200 MG
600 TABLET ORAL EVERY 6 HOURS
Refills: 0 | Status: DISCONTINUED | OUTPATIENT
Start: 2024-03-26 | End: 2024-03-27

## 2024-03-26 RX ORDER — FAMOTIDINE 10 MG/ML
20 INJECTION INTRAVENOUS ONCE
Refills: 0 | Status: COMPLETED | OUTPATIENT
Start: 2024-03-26 | End: 2024-03-26

## 2024-03-26 RX ORDER — OXYCODONE HYDROCHLORIDE 5 MG/1
10 TABLET ORAL
Refills: 0 | Status: DISCONTINUED | OUTPATIENT
Start: 2024-03-26 | End: 2024-03-27

## 2024-03-26 RX ORDER — INFLUENZA VIRUS VACCINE 15; 15; 15; 15 UG/.5ML; UG/.5ML; UG/.5ML; UG/.5ML
0.5 SUSPENSION INTRAMUSCULAR ONCE
Refills: 0 | Status: DISCONTINUED | OUTPATIENT
Start: 2024-03-26 | End: 2024-03-30

## 2024-03-26 RX ORDER — LANOLIN
1 OINTMENT (GRAM) TOPICAL EVERY 6 HOURS
Refills: 0 | Status: DISCONTINUED | OUTPATIENT
Start: 2024-03-26 | End: 2024-03-30

## 2024-03-26 RX ORDER — KETOROLAC TROMETHAMINE 30 MG/ML
30 SYRINGE (ML) INJECTION EVERY 6 HOURS
Refills: 0 | Status: DISCONTINUED | OUTPATIENT
Start: 2024-03-26 | End: 2024-03-27

## 2024-03-26 RX ORDER — OXYTOCIN 10 UNIT/ML
333.33 VIAL (ML) INJECTION
Qty: 20 | Refills: 0 | Status: DISCONTINUED | OUTPATIENT
Start: 2024-03-26 | End: 2024-03-30

## 2024-03-26 RX ORDER — NALOXONE HYDROCHLORIDE 4 MG/.1ML
0.1 SPRAY NASAL
Refills: 0 | Status: DISCONTINUED | OUTPATIENT
Start: 2024-03-26 | End: 2024-03-27

## 2024-03-26 RX ORDER — MAGNESIUM HYDROXIDE 400 MG/1
30 TABLET, CHEWABLE ORAL
Refills: 0 | Status: DISCONTINUED | OUTPATIENT
Start: 2024-03-26 | End: 2024-03-30

## 2024-03-26 RX ORDER — HEPARIN SODIUM 5000 [USP'U]/ML
5000 INJECTION INTRAVENOUS; SUBCUTANEOUS EVERY 12 HOURS
Refills: 0 | Status: DISCONTINUED | OUTPATIENT
Start: 2024-03-26 | End: 2024-03-30

## 2024-03-26 RX ORDER — CITRIC ACID/SODIUM CITRATE 300-500 MG
15 SOLUTION, ORAL ORAL ONCE
Refills: 0 | Status: COMPLETED | OUTPATIENT
Start: 2024-03-26 | End: 2024-03-26

## 2024-03-26 RX ORDER — SODIUM CHLORIDE 9 MG/ML
1000 INJECTION, SOLUTION INTRAVENOUS ONCE
Refills: 0 | Status: COMPLETED | OUTPATIENT
Start: 2024-03-26 | End: 2024-03-26

## 2024-03-26 RX ORDER — OXYTOCIN 10 UNIT/ML
333.33 VIAL (ML) INJECTION
Qty: 20 | Refills: 0 | Status: COMPLETED | OUTPATIENT
Start: 2024-03-26 | End: 2024-03-26

## 2024-03-26 RX ORDER — SODIUM CHLORIDE 9 MG/ML
1000 INJECTION, SOLUTION INTRAVENOUS
Refills: 0 | Status: DISCONTINUED | OUTPATIENT
Start: 2024-03-26 | End: 2024-03-26

## 2024-03-26 RX ORDER — OXYCODONE HYDROCHLORIDE 5 MG/1
5 TABLET ORAL ONCE
Refills: 0 | Status: DISCONTINUED | OUTPATIENT
Start: 2024-03-26 | End: 2024-03-27

## 2024-03-26 RX ORDER — DIPHENHYDRAMINE HCL 50 MG
25 CAPSULE ORAL EVERY 6 HOURS
Refills: 0 | Status: DISCONTINUED | OUTPATIENT
Start: 2024-03-26 | End: 2024-03-30

## 2024-03-26 RX ORDER — ONDANSETRON 8 MG/1
4 TABLET, FILM COATED ORAL EVERY 6 HOURS
Refills: 0 | Status: DISCONTINUED | OUTPATIENT
Start: 2024-03-26 | End: 2024-03-27

## 2024-03-26 RX ORDER — SODIUM CHLORIDE 9 MG/ML
1000 INJECTION, SOLUTION INTRAVENOUS
Refills: 0 | Status: DISCONTINUED | OUTPATIENT
Start: 2024-03-26 | End: 2024-03-30

## 2024-03-26 RX ORDER — FENTANYL CITRATE 50 UG/ML
25 INJECTION INTRAVENOUS
Refills: 0 | Status: DISCONTINUED | OUTPATIENT
Start: 2024-03-26 | End: 2024-03-26

## 2024-03-26 RX ORDER — ACETAMINOPHEN 500 MG
975 TABLET ORAL
Refills: 0 | Status: DISCONTINUED | OUTPATIENT
Start: 2024-03-26 | End: 2024-03-27

## 2024-03-26 RX ORDER — SIMETHICONE 80 MG/1
80 TABLET, CHEWABLE ORAL EVERY 4 HOURS
Refills: 0 | Status: DISCONTINUED | OUTPATIENT
Start: 2024-03-26 | End: 2024-03-30

## 2024-03-26 RX ORDER — TETANUS TOXOID, REDUCED DIPHTHERIA TOXOID AND ACELLULAR PERTUSSIS VACCINE, ADSORBED 5; 2.5; 8; 8; 2.5 [IU]/.5ML; [IU]/.5ML; UG/.5ML; UG/.5ML; UG/.5ML
0.5 SUSPENSION INTRAMUSCULAR ONCE
Refills: 0 | Status: DISCONTINUED | OUTPATIENT
Start: 2024-03-26 | End: 2024-03-30

## 2024-03-26 RX ORDER — ONDANSETRON 8 MG/1
4 TABLET, FILM COATED ORAL ONCE
Refills: 0 | Status: DISCONTINUED | OUTPATIENT
Start: 2024-03-26 | End: 2024-03-26

## 2024-03-26 RX ORDER — DEXAMETHASONE 0.5 MG/5ML
4 ELIXIR ORAL EVERY 6 HOURS
Refills: 0 | Status: DISCONTINUED | OUTPATIENT
Start: 2024-03-26 | End: 2024-03-27

## 2024-03-26 RX ORDER — MORPHINE SULFATE 50 MG/1
0.1 CAPSULE, EXTENDED RELEASE ORAL ONCE
Refills: 0 | Status: DISCONTINUED | OUTPATIENT
Start: 2024-03-26 | End: 2024-03-27

## 2024-03-26 RX ADMIN — HEPARIN SODIUM 5000 UNIT(S): 5000 INJECTION INTRAVENOUS; SUBCUTANEOUS at 18:18

## 2024-03-26 RX ADMIN — FENTANYL CITRATE 25 MICROGRAM(S): 50 INJECTION INTRAVENOUS at 17:35

## 2024-03-26 RX ADMIN — Medication 30 MILLIGRAM(S): at 23:47

## 2024-03-26 RX ADMIN — Medication 975 MILLIGRAM(S): at 21:34

## 2024-03-26 RX ADMIN — Medication 30 MILLIGRAM(S): at 18:18

## 2024-03-26 RX ADMIN — SODIUM CHLORIDE 1000 MILLILITER(S): 9 INJECTION, SOLUTION INTRAVENOUS at 11:04

## 2024-03-26 RX ADMIN — Medication 975 MILLIGRAM(S): at 20:36

## 2024-03-26 RX ADMIN — Medication 30 MILLIGRAM(S): at 18:33

## 2024-03-26 RX ADMIN — Medication 1000 MILLIUNIT(S)/MIN: at 16:10

## 2024-03-26 RX ADMIN — FENTANYL CITRATE 25 MICROGRAM(S): 50 INJECTION INTRAVENOUS at 17:20

## 2024-03-26 RX ADMIN — Medication 15 MILLILITER(S): at 10:20

## 2024-03-26 RX ADMIN — FAMOTIDINE 20 MILLIGRAM(S): 10 INJECTION INTRAVENOUS at 10:21

## 2024-03-26 NOTE — OB NEONATOLOGY/PEDIATRICIAN DELIVERY SUMMARY - NSPEDSNEONOTESA_OBGYN_ALL_OB_FT
Called by OBGYN to attend CS delivery due to Prematurity at 34.5 week GA for Placenta Previa/Acretta in Main OR. Baby is product of a 34.5 week gestation born to a G 40P2 31 y.o. F. Maternal labs include Blood Type a+, HIV neg, RPR neg, Hep B neg, GBS neg. Maternal history is significant for obesity, cholecystectomy, anxiety. Pregnancy was complicated by Placenta Previa/Placenta Accreta. ROM at delivery.  Delivery uncomplicated . Baby emerged crying and vigorous. Delayed cord clamping x60s performed. Resuscitation included w/d/s/s. Apgars were 9/9.  Admit to NICU. Temperature prior to transfer: 36.8 C.

## 2024-03-26 NOTE — PRE-OP CHECKLIST - WARM FLUIDS/WARM BLANKETS
LET US KNOW HOW WE DID  You may be receiving a patient satisfaction survey in the mail.  We would appreciate it if you could please take the time to complete, as your feedback is very important to us. We strive to make your experience exceptional and your comments help us with that goal.  We look forward to hearing from you.  Feedback is anonymous unless you choose otherwise.    PRESCRIPTION REFILLS  If you need a prescription refill prior to your next office visit, please call your pharmacy and let them know.  The pharmacy will then contact us for the refill.   Please allow 24-48 hours for the refill to be processed.  We recommend calling your pharmacy at least three (3) business days before your medication runs out.      PRESCRIPTION  FOR CONTROLLED SUBSTANCES  This clinic dispenses prescription refills for narcotics, only to the patient and only Monday - Friday; no Saturdays.        LAB AND X-RAY HOURS FOR [address] [place] [address]  Monday - Thursday 7 am - 5:30 pm  Friday 7 am - 4:30 pm      TEST RESULTS  If your physician has ordered additional laboratory or radiology testing as part of your ongoing plan of care, notification of the test results will be communicated in a timely manner once reviewed by your provider.   -  If your results are normal, you will receive a letter in the mail.   -  If there are any irregularities, you will receive a phone call from our office within 5 - 7 business days.   -  If your results are critical and require more immediate intervention, you will be contacted promptly.       
no

## 2024-03-26 NOTE — OB RN INTRAOPERATIVE NOTE - NSSELHIDDEN_OBGYN_ALL_OB_FT
[NS_DeliveryAttending1_OBGYN_ALL_OB_FT:[id number]],[NS_DeliveryAssist1_OBGYN_ALL_OB_FT:[id number]]

## 2024-03-26 NOTE — PRE-OP CHECKLIST - LOOSE TEETH
[de-identified] : Ms. Veloz presents today for a follow up 3 months s/p sleeve gastrectomy on 10/23/23. Patient reports she is doing well with no complains, has lost over 40lbs so far. Denies abd pain, n/v, acid reflux. Discussed ensuring she is chewing food well and eating slowly to prevent any discomfort or regurgitation. Otherwise tolerating a regular diet with adequate protein intake. Compliant with vitamins. Exercising 4x/ week doing cardio and strength training. Denies any difficulty with bowel movements. 
no

## 2024-03-26 NOTE — OB PROVIDER H&P - NSMATERNALFETALCONCERNS_OBGYN_ALL_OB_FT
Maternal Alert  S/P MATERNAL MDM 3/20/2024- see minutes   BMI > 42  C/S 3/26/2024 SUSPECTED ACCRETA MAIN OR   S/P NICU CONSULT   Archana Alberts RN 3/20/2024

## 2024-03-26 NOTE — CHART NOTE - NSCHARTNOTEFT_GEN_A_CORE
Discussed plan w pt  confirmed not desire for fertility  pt feeling nervous    ICU Vital Signs Last 24 Hrs  T(C): 37.1 (26 Mar 2024 10:58), Max: 37.1 (26 Mar 2024 10:33)  HR: 102 (26 Mar 2024 10:58) (102 - 102)  BP: 110/70 (26 Mar 2024 10:58) (110/70 - 110/74)  RR: 20 (26 Mar 2024 10:58) (18 - 20)  SpO2: 100% (26 Mar 2024 10:58) (100% - 100%)      Pt accepts blood, discussed  pain control, postop heparin and PAS stockings  Discussed risk of surgery/blood loss    MANNY Agudelo MD

## 2024-03-26 NOTE — OB PROVIDER DELIVERY SUMMARY - NSSELHIDDEN_OBGYN_ALL_OB_FT
[NS_DeliveryAttending1_OBGYN_ALL_OB_FT:[id number]],[NS_DeliveryAssist1_OBGYN_ALL_OB_FT:[id number]]

## 2024-03-26 NOTE — OB RN DELIVERY SUMMARY - NS_SEPSISRSKCALC_OBGYN_ALL_OB_FT
EOS calculated successfully. EOS Risk Factor: 0.5/1000 live births ([facility] national incidence); GA=34w6d; Temp=98.8; ROM=0.017; GBS='Negative'; Antibiotics='No antibiotics or any antibiotics < 2 hrs prior to birth'

## 2024-03-26 NOTE — OB RN PATIENT PROFILE - EDUCATION OF THE PLACEMENT OF INFANT DURING SKIN TO SKIN: THE INFANT IS TO BE PLACED BELLY DOWN DIRECTLY ON PARENT'S CHEST, POSITIONED WITH INFANT'S FACE TOWARD PARENT'S FACE, SO THE PARENT CAN OBSERVE THE INFANT’S COLOR AT ALL TIMES.
breath  Cardiovascular: negative for chest pain, irregular heart beat and palpitations  Gastrointestinal: negative for abdominal pain, diarrhea, nausea and vomiting  Genitourinary:negative for dysuria and hematuria     Planned anesthesia: general.  Known anesthesia problems: none  Bleeding risk:  Low   Personal or FH of DVT/PE:  Yes  Patient objection to receiving blood products: no     Objective:      BP (!) 142/78   Pulse 80   Ht 5' 3\" (1.6 m)   Wt (!) 317 lb (143.8 kg)   LMP 12/24/2010   BMI 56.15 kg/m²     General Appearance:  Alert, cooperative, no distress, appears stated age   Head:  Normocephalic, without obvious abnormality, atraumatic   Eyes:  PERRL, conjunctiva/corneas clear, EOM's intact           Throat: Lips, mucosa, and tongue normal; teeth and gums normal   Neck: Supple, symmetrical, trachea midline, no adenopathy;  thyroid: not enlarged, symmetric, no tenderness/mass/nodules; no carotid bruit or JVD       Lungs:   Clear to auscultation bilaterally, respirations unlabored       Heart:  Regular rate and rhythm, S1 and S2 normal, no murmur, rub, or gallop   Abdomen:   Soft, non-tender, bowel sounds active all four quadrants,  no masses, no organomegaly   Pelvic: Deferred   Extremities: Extremities normal, atraumatic, no cyanosis or edema   Pulses: 2+ and symmetric   Skin: Skin color, texture, turgor normal, no rashes or lesions   Lymph nodes: Cervical, supraclavicular, and axillary nodes normal                    Assessment:       Diagnosis Orders   1. Osteoarthritis of right knee, unspecified osteoarthritis type     2. Pre-operative general physical examination     3. History of DVT (deep vein thrombosis)     4. Morbid obesity with BMI of 60.0-69.9, adult ([facility] [address]     5. Mild intermittent asthma without complication           66 y.o. female with planned surgery as above. Plan:   Mild persistent asthma. Stable. Patient medically cleared for above planned procedure.
Statement Selected
moderate assist (50% patients effort)

## 2024-03-26 NOTE — OB PROVIDER H&P - ATTENDING COMMENTS
34yo  @ 37 wks admitted for  repeat  for previa, thin ELIS poss abnormal placentation w desire for tubal sterilization  poss hysterotomy, w planned vertical skin incision      GYN backup  urology for stents  Hg 12    preop TXA, hemorrhage kit, 2 units in OR and 2 IVs  consent for sterilization in chart  will discuss plan above  accepts blood    MANNY Agudelo MD   w [place] until 6pm

## 2024-03-26 NOTE — OB PROVIDER DELIVERY SUMMARY - NSPROVIDERDELIVERYNOTE_OBGYN_ALL_OB_FT
rCCS performed at 34w6d for placenta previa and suspected accreta  bilateral ureteral catheters placed by urology    viable infant, cephalic presentation, weight 2650g, APGARS 9/9    thin, filmy adhesions between omentum and anterior uterus, as well as between the left adnexa and left pelvic sidewall   grossly normal uterus, b/l tubes and ovaries  no evidence of percreta, placenta  from uterus spontaneously and without difficulty  hysterotomy closed in 3 layers with PDS suture  right salpingectomy performed  left fallopian tube adherent to left ovary, so unable to perform salpingectomy - left tubal ligation performed  Interceed placed over hysterotomy  peritoneum and fascia reapproximated en bloc with loop PDS suture     1250/1800/200    Dictation #: rCCS performed at 34w6d for placenta previa and suspected accreta  bilateral ureteral catheters placed by urology    viable infant, cephalic presentation, weight 2650g, APGARS 9/9    thin, filmy adhesions between omentum and anterior uterus, as well as between the left adnexa and left pelvic sidewall   grossly normal uterus, b/l tubes and ovaries  no evidence of percreta, placenta  from uterus spontaneously and without difficulty  hysterotomy closed in 3 layers with PDS suture  right salpingectomy performed  left fallopian tube adherent to left ovary, so unable to perform salpingectomy - left tubal ligation performed  Interceed placed over hysterotomy  peritoneum and fascia reapproximated en bloc with loop PDS suture     1250/1800/200    Dictation #: [id number]

## 2024-03-26 NOTE — PRE-ANESTHESIA EVALUATION ADULT - NSANTHDISPORD_GEN_ALL_CORE
2/2/2023 [facility] notified and will  rx today
2/2/2023 wife called and said that Dr Leon Turk took him off a statin over 30 years ago she thinks it was because of liver enzymes  She thought is was Dr Birgit Nieto but it was not him he put him on 4 Lovaza daily  Maybe she thought if you put him on a different statin maybe that would work  She just wanted to let you know what she found and to see your opinion  Call [facility] tomorrow not today b/c they have appt all day 
Will retry new statin  Crestor 10mg daily   Check liver function tests 2 weeks after starting
PACU

## 2024-03-26 NOTE — CHART NOTE - NSCHARTNOTEFT_GEN_A_CORE
Called to bedside by RN, patient endorsing left eye discomfort. Patient states, "It feels like there is something in my eye, there is a piece of hair in my eye". Eye examined with no foreign body present, patient has been using a gauze to wipe her eyes since arrival to PACU. Left eye tearing slightly, patient presentation consistent with corneal abrasion. Anesthesia corneal abraison protocol explained to patient, educating patient that initial tetracaine opthalmic solution will burn for a few seconds and then subside. Patient refused treatment stating, "I am not having you put a drop in my eye if it is going to burn" Patient refusing treatment of suspected corneal abrasion. Called to bedside by RN, patient endorsing left eye discomfort. Patient states, "It feels like there is something in my eye, there is a piece of hair in my eye". Eye examined with no foreign body present, patient has been using a gauze to wipe her eyes since arrival to PACU. Left eye tearing slightly, patient presentation consistent with corneal abrasion. Anesthesia corneal abraison protocol explained to patient, educating patient that initial tetracaine opthalmic solution will burn for a few seconds and then subside. Patient refused treatment stating, "I am not having you put a drop in my eye if it is going to burn, I did not scratch my eye" Patient refusing treatment of suspected corneal abrasion.

## 2024-03-26 NOTE — OB PROVIDER H&P - NSLOWPPHRISK_OBGYN_A_OB
Causey Pregnancy/Less than or equal to 4 previous vaginal births/No known bleeding disorder/No history of postpartum hemorrhage/No other PPH risks indicated

## 2024-03-26 NOTE — PRE-ANESTHESIA EVALUATION ADULT - NSANTHADDINFOFT_GEN_ALL_CORE
r/b/a discussed, questions answered, pt wishes to proceed.  Plan for ga backup with cse and primary anesthetic, large bore IV, a line

## 2024-03-26 NOTE — OB PROVIDER H&P - HISTORY OF PRESENT ILLNESS
Admission H&P    Subjective  HPI: 33yoF  @34w5d presents for scheduled  delivery and bilateral salpingectomy, possible hysterectomy for placenta previa with suspected placenta accreta. +FM. -LOF. -CTXs. -VB. Pt denies any other concerns.    – PNC: placenta previa w/ accreta. Last sono(3/22): breech, anterior complete previa extending to level of umbilicus, 2550g(64%),  GBS pos.    – OBHx:   : pCS @35wk 2/2 PPROM  2018: FT rCS  – GynHx: denies  – PMH: chronic back pain 2/2 herniated cervical disc, obesity  – PSH: s/p cholecystectomy(), C/S x2(, ), D&C(2019)  – Psych: denies   – Social: denies   – Meds: PNV, ASA  – Allergies: NKDA  – Will accept blood transfusions? Yes

## 2024-03-26 NOTE — OB PROVIDER H&P - ASSESSMENT
Assessment  32yo  @34w6d admitted for repeat c/s and BS, possible hysterectomy in setting of placenta previa with suspected placenta accreta.    Plan  1. Consents signed for  delivery, bilateral salpingectomy, possible hysterectomy.  2. NPO/IVF.  3. Fetus: cat 1 tracing. VTX. EFW 2550g by sono. Continuous EFM. Sono. No concerns.  4. Prenatal issues: placenta previa and suspected placenta accreta  5. Pain: anesthesia consult  6. 2uPRBCs on hold  7. Urology consents for ureteral catheter placement    Patient discussed with attending physician, Dr. Agudelo.  Maria Esther Aiken MD PGY4

## 2024-03-26 NOTE — OB RN DELIVERY SUMMARY - NSSELHIDDEN_OBGYN_ALL_OB_FT
[[email or web address]_OBGYN_ALL_OB_FT:[id number]==],[NS_DeliveryAssist1_OBGYN_ALL_OB_FT:[id number]]

## 2024-03-26 NOTE — CHART NOTE - NSCHARTNOTEFT_GEN_A_CORE
GYN POST-OP CHECK    S: Pt awake and alert resting comfortably in bed. Pain controlled. Pt denies N/V, SOB, CP, palpitations. Has not yet attempted clears. Not OOB yet.    O:   T(C): 36.7 (03-26-24 @ 15:35), Max: 36.7 (03-26-24 @ 15:35)  HR: 84 (03-26-24 @ 17:30) (79 - 99)  BP: 107/63 (03-26-24 @ 15:35) (107/63 - 107/63)  RR: 16 (03-26-24 @ 17:30) (14 - 16)  SpO2: 99% (03-26-24 @ 17:30) (97% - 99%)  I&O's Summary    26 Mar 2024 07:01  -  26 Mar 2024 17:46  --------------------------------------------------------  IN: 500 mL / OUT: 50 mL / NET: 450 mL      Gen: NAD. A+Ox3.  CV: warm, well-perfused  Lungs: normal work of breathing  Abd: soft, nondistended and appropriately tender.  Inc: midline vertical skin incision clean/dry/intact w/ dermabond prineo dressing overlaying  : minimal lochia, no active bleeding, clark in place draining adequate amount of blood-tinged urine, right ucath in place  Ext: SCDs in place bilaterally    A/P: 33y Female POD#0 s/p rCCS, RS, L tubal ligation, cysto, bilateral ureteral catheters. Patient stable and recovering well postoperatively.    1. Neuro: epidural catheter removed. Continue Tylenol, Toradol, Oxy PRN. Anti-emetics PRN.  2. Card: Monitor VS. CBC in AM.   3. Pulm: Incentive spirometer use.   4. GI: Advance to clear liquid diet as tolerated. Anti-emetics PRN.  5. : s/p bilateral ureteral catheters. Clark to gravity. Remove clark catheter in AM, followed by spontaneous voiding trial. Continue postpartum pitocin per routine.  6. Electrolytes: LR@125cc/hr.  7. DVT ppx w/ HSQ and SCDs while in bed. Early ambulation, initially with assistance then as tolerated.  8. Discharge from PACU to 3KN.     d/w Dr. Magnus Aiken, PGY4 OB POST-OP CHECK    S: Pt awake and alert resting comfortably in bed. Pain controlled. Pt denies N/V, SOB, CP, palpitations. Has not yet attempted clears. Not OOB yet.    O:   T(C): 36.7 (03-26-24 @ 15:35), Max: 36.7 (03-26-24 @ 15:35)  HR: 84 (03-26-24 @ 17:30) (79 - 99)  BP: 107/63 (03-26-24 @ 15:35) (107/63 - 107/63)  RR: 16 (03-26-24 @ 17:30) (14 - 16)  SpO2: 99% (03-26-24 @ 17:30) (97% - 99%)  I&O's Summary    26 Mar 2024 07:01  -  26 Mar 2024 17:46  --------------------------------------------------------  IN: 500 mL / OUT: 50 mL / NET: 450 mL      Gen: NAD. A+Ox3.  CV: warm, well-perfused  Lungs: normal work of breathing  Abd: soft, nondistended and appropriately tender.  Inc: midline vertical skin incision clean/dry/intact w/ dermabond prineo dressing overlaying  : minimal lochia, no active bleeding, clark in place draining adequate amount of blood-tinged urine, right ucath in place  Ext: SCDs in place bilaterally    A/P: 33y Female POD#0 s/p rCCS, RS, L tubal ligation, cysto, bilateral ureteral catheters. Patient stable and recovering well postoperatively.    1. Neuro: epidural catheter removed. Continue Tylenol, Toradol, Oxy PRN. Anti-emetics PRN.  2. Card: Monitor VS. CBC in AM.   3. Pulm: Incentive spirometer use.   4. GI: Advance to clear liquid diet as tolerated. Anti-emetics PRN.  5. : s/p bilateral ureteral catheters. Clark to gravity. Remove clark catheter in AM, followed by spontaneous voiding trial. Continue postpartum pitocin per routine.  6. Electrolytes: LR@125cc/hr.  7. DVT ppx w/ HSQ and SCDs while in bed. Early ambulation, initially with assistance then as tolerated.  8. Discharge from PACU to 3KN.     d/w Dr. Magnus Aiken, PGY4

## 2024-03-27 LAB
BASOPHILS # BLD AUTO: 0.02 K/UL — SIGNIFICANT CHANGE UP (ref 0–0.2)
BASOPHILS NFR BLD AUTO: 0.2 % — SIGNIFICANT CHANGE UP (ref 0–2)
EOSINOPHIL # BLD AUTO: 0.06 K/UL — SIGNIFICANT CHANGE UP (ref 0–0.5)
EOSINOPHIL NFR BLD AUTO: 0.6 % — SIGNIFICANT CHANGE UP (ref 0–6)
HCT VFR BLD CALC: 28.4 % — LOW (ref 34.5–45)
HGB BLD-MCNC: 9.6 G/DL — LOW (ref 11.5–15.5)
IMM GRANULOCYTES NFR BLD AUTO: 0.6 % — SIGNIFICANT CHANGE UP (ref 0–0.9)
LYMPHOCYTES # BLD AUTO: 1.96 K/UL — SIGNIFICANT CHANGE UP (ref 1–3.3)
LYMPHOCYTES # BLD AUTO: 21.2 % — SIGNIFICANT CHANGE UP (ref 13–44)
MCHC RBC-ENTMCNC: 28.4 PG — SIGNIFICANT CHANGE UP (ref 27–34)
MCHC RBC-ENTMCNC: 33.8 GM/DL — SIGNIFICANT CHANGE UP (ref 32–36)
MCV RBC AUTO: 84 FL — SIGNIFICANT CHANGE UP (ref 80–100)
MONOCYTES # BLD AUTO: 0.62 K/UL — SIGNIFICANT CHANGE UP (ref 0–0.9)
MONOCYTES NFR BLD AUTO: 6.7 % — SIGNIFICANT CHANGE UP (ref 2–14)
NEUTROPHILS # BLD AUTO: 6.52 K/UL — SIGNIFICANT CHANGE UP (ref 1.8–7.4)
NEUTROPHILS NFR BLD AUTO: 70.7 % — SIGNIFICANT CHANGE UP (ref 43–77)
NRBC # BLD: 0 /100 WBCS — SIGNIFICANT CHANGE UP (ref 0–0)
PLATELET # BLD AUTO: 149 K/UL — LOW (ref 150–400)
RBC # BLD: 3.38 M/UL — LOW (ref 3.8–5.2)
RBC # FLD: 13.6 % — SIGNIFICANT CHANGE UP (ref 10.3–14.5)
T PALLIDUM AB TITR SER: NEGATIVE — SIGNIFICANT CHANGE UP
WBC # BLD: 9.24 K/UL — SIGNIFICANT CHANGE UP (ref 3.8–10.5)
WBC # FLD AUTO: 9.24 K/UL — SIGNIFICANT CHANGE UP (ref 3.8–10.5)

## 2024-03-27 RX ORDER — IBUPROFEN 200 MG
600 TABLET ORAL EVERY 6 HOURS
Refills: 0 | Status: COMPLETED | OUTPATIENT
Start: 2024-03-27 | End: 2025-02-23

## 2024-03-27 RX ORDER — KETOROLAC TROMETHAMINE 30 MG/ML
30 SYRINGE (ML) INJECTION EVERY 6 HOURS
Refills: 0 | Status: DISCONTINUED | OUTPATIENT
Start: 2024-03-27 | End: 2024-03-28

## 2024-03-27 RX ORDER — OXYCODONE HYDROCHLORIDE 5 MG/1
5 TABLET ORAL ONCE
Refills: 0 | Status: DISCONTINUED | OUTPATIENT
Start: 2024-03-27 | End: 2024-03-30

## 2024-03-27 RX ORDER — ACETAMINOPHEN 500 MG
975 TABLET ORAL
Refills: 0 | Status: DISCONTINUED | OUTPATIENT
Start: 2024-03-27 | End: 2024-03-30

## 2024-03-27 RX ORDER — OXYCODONE HYDROCHLORIDE 5 MG/1
5 TABLET ORAL
Refills: 0 | Status: DISCONTINUED | OUTPATIENT
Start: 2024-03-27 | End: 2024-03-30

## 2024-03-27 RX ADMIN — Medication 975 MILLIGRAM(S): at 21:52

## 2024-03-27 RX ADMIN — SIMETHICONE 80 MILLIGRAM(S): 80 TABLET, CHEWABLE ORAL at 05:31

## 2024-03-27 RX ADMIN — Medication 30 MILLIGRAM(S): at 00:33

## 2024-03-27 RX ADMIN — Medication 975 MILLIGRAM(S): at 14:38

## 2024-03-27 RX ADMIN — Medication 975 MILLIGRAM(S): at 09:30

## 2024-03-27 RX ADMIN — Medication 975 MILLIGRAM(S): at 09:13

## 2024-03-27 RX ADMIN — HEPARIN SODIUM 5000 UNIT(S): 5000 INJECTION INTRAVENOUS; SUBCUTANEOUS at 18:24

## 2024-03-27 RX ADMIN — Medication 30 MILLIGRAM(S): at 18:23

## 2024-03-27 RX ADMIN — Medication 975 MILLIGRAM(S): at 15:35

## 2024-03-27 RX ADMIN — Medication 30 MILLIGRAM(S): at 05:32

## 2024-03-27 RX ADMIN — HEPARIN SODIUM 5000 UNIT(S): 5000 INJECTION INTRAVENOUS; SUBCUTANEOUS at 05:32

## 2024-03-27 RX ADMIN — Medication 975 MILLIGRAM(S): at 02:23

## 2024-03-27 RX ADMIN — Medication 975 MILLIGRAM(S): at 03:21

## 2024-03-27 RX ADMIN — Medication 975 MILLIGRAM(S): at 20:52

## 2024-03-27 NOTE — PROGRESS NOTE ADULT - SUBJECTIVE AND OBJECTIVE BOX
OB Progress Note:  Delivery, POD#1    S: 34yo POD#1 s/p rCCS, L tubal ligation, cysto, ucaths. Not yet voiding spontaneously, clark was d/fausto @540a this morning. Not yet tolerating regular diet - is currently tolerating clear liquid diet. Pain well controlled, not yet passing flatus, ambulating without difficulty. Denies heavy vaginal bleeding, N/V, CP/SOB/lightheadedness/dizziness.     O:   Vital Signs Last 24 Hrs  T(C): 36.7 (27 Mar 2024 05:47), Max: 37.1 (26 Mar 2024 10:33)  T(F): 98 (27 Mar 2024 05:47), Max: 98.8 (26 Mar 2024 10:33)  HR: 84 (27 Mar 2024 05:47) (67 - 102)  BP: 94/64 (27 Mar 2024 05:47) (91/57 - 110/74)  BP(mean): 76 (26 Mar 2024 18:30) (69 - 76)  RR: 18 (27 Mar 2024 05:47) (14 - 20)  SpO2: 98% (27 Mar 2024 05:47) (97% - 100%)    Parameters below as of 27 Mar 2024 05:47  Patient On (Oxygen Delivery Method): room air        Labs:  Blood type: A Positive  Rubella IgG: RPR: Negative                          9.6<L>   9.24 >-----------< 149<L>    (  @ 06:21 )             28.4<L>                        10.9<L>   16.86<H> >-----------< 174    (  @ 16:26 )             32.2<L>                  PE:  General: NAD  CV: RRR  Resp: CTAB  Abdomen: Mildly distended, appropriately tender, Fundus firm, incision (classical vertical) c/d/i.  : Nl lochia  Extremities: No erythema, no pitting edema     OB Progress Note:  Delivery, POD#1    S: 32yo POD#1 s/p rCCS, RS, L tubal ligation, cysto, ucaths. Not yet voiding spontaneously, clark was d/fausto @540a this morning. Not yet tolerating regular diet - is currently tolerating clear liquid diet. Pain well controlled, not yet passing flatus, ambulating without difficulty. Denies heavy vaginal bleeding, N/V, CP/SOB/lightheadedness/dizziness.     O:   Vital Signs Last 24 Hrs  T(C): 36.7 (27 Mar 2024 05:47), Max: 37.1 (26 Mar 2024 10:33)  T(F): 98 (27 Mar 2024 05:47), Max: 98.8 (26 Mar 2024 10:33)  HR: 84 (27 Mar 2024 05:47) (67 - 102)  BP: 94/64 (27 Mar 2024 05:47) (91/57 - 110/74)  BP(mean): 76 (26 Mar 2024 18:30) (69 - 76)  RR: 18 (27 Mar 2024 05:47) (14 - 20)  SpO2: 98% (27 Mar 2024 05:47) (97% - 100%)    Parameters below as of 27 Mar 2024 05:47  Patient On (Oxygen Delivery Method): room air        Labs:  Blood type: A Positive  Rubella IgG: RPR: Negative                          9.6<L>   9.24 >-----------< 149<L>    (  @ 06:21 )             28.4<L>                        10.9<L>   16.86<H> >-----------< 174    (  @ 16:26 )             32.2<L>                  PE:  General: NAD  CV: RRR  Resp: CTAB  Abdomen: Mildly distended, appropriately tender, Fundus firm, incision (classical vertical) c/d/i.  : Nl lochia  Extremities: No erythema, no pitting edema

## 2024-03-27 NOTE — PROGRESS NOTE ADULT - ASSESSMENT
A/P: 32yo  POD#1 s/p rCCS, L tubal ligation, cysto, ucaths.  Patient is stable and doing well post-operatively.    - Pt DTV  - Currently clear liquid diet, will ADAT  - Increase ambulation  - Continue motrin, tylenol, oxycodone PRN for pain control.    - AM CBC 10.9/32.2 -> 9.6/28.4 in setting QBL 1250    Art Holt MD PGY1 A/P: 32yo  POD#1 s/p rCCS, RS, L tubal ligation, cysto, ucaths.  Patient is stable and doing well post-operatively.    - Pt DTV  - Currently clear liquid diet, will ADAT  - Increase ambulation  - Continue motrin, tylenol, oxycodone PRN for pain control.    - AM CBC 10.9/32.2 -> 9.6/28.4 in setting QBL 1250    Art Holt MD PGY1 A/P: 32yo  POD#1 s/p rCCS, RS, L tubal ligation, cysto, ucaths.  Patient is stable and doing well post-operatively.    - Pt DTV  - Currently clear liquid diet, will discuss advancing diet   - Increase ambulation  - Continue motrin, tylenol, oxycodone PRN for pain control.    - AM CBC 10.9/32.2 -> 9.6/28.4 in setting QBL 1250    Art Holt MD PGY1

## 2024-03-27 NOTE — PROGRESS NOTE ADULT - SUBJECTIVE AND OBJECTIVE BOX
Day 1 of Anesthesia Pain Management Service    SUBJECTIVE: Doing ok  Pain Scale Score:          [X] Refer to charted pain scores    THERAPY:    s/p   100  mcg PF morphine on 3\26\2024      MEDICATIONS  (STANDING):  acetaminophen     Tablet .. 975 milliGRAM(s) Oral <User Schedule>  diphtheria/tetanus/pertussis (acellular) Vaccine (Adacel) 0.5 milliLiter(s) IntraMuscular once  heparin   Injectable 5000 Unit(s) SubCutaneous every 12 hours  ibuprofen  Tablet. 600 milliGRAM(s) Oral every 6 hours  influenza   Vaccine 0.5 milliLiter(s) IntraMuscular once  ketorolac   Injectable 30 milliGRAM(s) IV Push every 6 hours  lactated ringers. 1000 milliLiter(s) (125 mL/Hr) IV Continuous <Continuous>  morphine PF Spinal 0.1 milliGRAM(s) IntraThecal. once  oxytocin Infusion 333.333 milliUNIT(s)/Min (1000 mL/Hr) IV Continuous <Continuous>    MEDICATIONS  (PRN):  dexAMETHasone  Injectable 4 milliGRAM(s) IV Push every 6 hours PRN Nausea  diphenhydrAMINE 25 milliGRAM(s) Oral every 6 hours PRN Pruritus  lanolin Ointment 1 Application(s) Topical every 6 hours PRN Sore Nipples  magnesium hydroxide Suspension 30 milliLiter(s) Oral two times a day PRN Constipation  nalbuphine Injectable 2.5 milliGRAM(s) IV Push every 6 hours PRN Pruritus  naloxone Injectable 0.1 milliGRAM(s) IV Push every 3 minutes PRN For ANY of the following changes in patient status:  A. Breaths Per Minute LESS THAN 10, B. Oxygen saturation LESS THAN 90%, C. Sedation score of 6 for Stop After: 4 Times  ondansetron Injectable 4 milliGRAM(s) IV Push every 6 hours PRN Nausea  oxyCODONE    IR 5 milliGRAM(s) Oral every 3 hours PRN Mild Pain (1 - 3)  oxyCODONE    IR 5 milliGRAM(s) Oral every 3 hours PRN Moderate to Severe Pain (4-10)  oxyCODONE    IR 10 milliGRAM(s) Oral every 3 hours PRN Moderate Pain (4 - 6)  oxyCODONE    IR 5 milliGRAM(s) Oral once PRN Moderate to Severe Pain (4-10)  simethicone 80 milliGRAM(s) Chew every 4 hours PRN Gas      OBJECTIVE:    Sedation:        	[X] Alert	 [ ] Drowsy	[ ] Arousable      [ ] Asleep       [ ] Unresponsive    Side Effects:	[ ] None 	[ ] Nausea	[ ] Vomiting         [ X] Pruritus  		[ ] Weakness            [ ] Numbness	          [ ] Other:    Vital Signs Last 24 Hrs  T(C): 36.3 (27 Mar 2024 08:54), Max: 37 (26 Mar 2024 17:45)  T(F): 97.4 (27 Mar 2024 08:54), Max: 98.6 (26 Mar 2024 17:45)  HR: 89 (27 Mar 2024 08:54) (67 - 99)  BP: 90/57 (27 Mar 2024 08:54) (90/57 - 107/63)  BP(mean): 76 (26 Mar 2024 18:30) (69 - 76)  RR: 18 (27 Mar 2024 08:54) (14 - 18)  SpO2: 98% (27 Mar 2024 08:54) (97% - 99%)    Parameters below as of 27 Mar 2024 08:54  Patient On (Oxygen Delivery Method): room air        ASSESSMENT/ PLAN  [X] Patient to be transitioned to prn analgesics today  [X] Pain management per primary service, pain service to sign off   [X]Documentation and Verification of current medications

## 2024-03-27 NOTE — CHART NOTE - NSCHARTNOTEFT_GEN_A_CORE
Pt evaluated at beside due to report of left abd protrusion.    S: 34yo POD#1 s/p rCCS, RS, L tubal ligation, cysto, ucaths. Pt reports that she was walking and noticed her left abdomen protrude next to incision. Denies tenderness or palpable bulge.      Pain well controlled, tolerating regular diet, passing flatus, had BMx1, ambulating without difficulty, voiding spontaneously. Denies heavy vaginal bleeding, N/V, CP/SOB/lightheadedness/dizziness.     O:   Vital Signs Last 24 Hrs  T(C): 37.6 (27 Mar 2024 16:42), Max: 37.6 (27 Mar 2024 16:42)  T(F): 99.6 (27 Mar 2024 16:42), Max: 99.6 (27 Mar 2024 16:42)  HR: 105 (27 Mar 2024 16:42) (67 - 105)  BP: 106/72 (27 Mar 2024 16:42) (90/57 - 106/72)  BP(mean): 76 (26 Mar 2024 18:30) (69 - 76)  RR: 18 (27 Mar 2024 16:42) (16 - 18)  SpO2: 97% (27 Mar 2024 16:42) (97% - 99%)    Parameters below as of 27 Mar 2024 13:06  Patient On (Oxygen Delivery Method): room air        Labs:  Blood type: A Positive  Rubella IgG: RPR: Negative                          9.6<L>   9.24 >-----------< 149<L>    ( 03-27 @ 06:21 )             28.4<L>                        10.9<L>   16.86<H> >-----------< 174    ( 03-26 @ 16:26 )             32.2<L>                  PE:  General: NAD  CV: RRR  Resp: CTAB  Abdomen: Mildly distended, appropriately tender, Fundus firm, incision c/d/i. puckering of skin at level of incision near umbilicus. no bulge noted. no fluctuance   : Nl lochia  Extremities: No erythema, no pitting edema    A/P: 34yo POD#1 s/p rCCS, RS, L tubal ligation, cysto, ucaths.  Patient is stable and doing well post-operatively.    - Reassured by exam and discussed with pt low c/f hematoma, fluid collection, or hernia. Likely skin puckering. Advised pt to monitor   - Continue regular diet  - Increase ambulation  - Continue motrin, tylenol, oxycodone PRN for pain control.      Pt seen with Dr. Aiken PGY4 and d/w Dr. Kendrick oHlt MD PGY1 Pt evaluated at beside due to report of left abd protrusion.    S: 32yo POD#1 s/p rCCS, RS, L tubal ligation, cysto, ucaths. Pt reports that she was walking and noticed area of her left abdomen protrude next to incision. Denies tenderness or palpable bulge. Denies concerns for incision - denies induration, bleeding, edema.      Pain well controlled, tolerating regular diet, passing flatus, had BMx1, ambulating without difficulty, voiding spontaneously. Denies heavy vaginal bleeding, N/V, CP/SOB/lightheadedness/dizziness.     O:   Vital Signs Last 24 Hrs  T(C): 37.6 (27 Mar 2024 16:42), Max: 37.6 (27 Mar 2024 16:42)  T(F): 99.6 (27 Mar 2024 16:42), Max: 99.6 (27 Mar 2024 16:42)  HR: 105 (27 Mar 2024 16:42) (67 - 105)  BP: 106/72 (27 Mar 2024 16:42) (90/57 - 106/72)  BP(mean): 76 (26 Mar 2024 18:30) (69 - 76)  RR: 18 (27 Mar 2024 16:42) (16 - 18)  SpO2: 97% (27 Mar 2024 16:42) (97% - 99%)    Parameters below as of 27 Mar 2024 13:06  Patient On (Oxygen Delivery Method): room air        Labs:  Blood type: A Positive  Rubella IgG: RPR: Negative                          9.6<L>   9.24 >-----------< 149<L>    ( 03-27 @ 06:21 )             28.4<L>                        10.9<L>   16.86<H> >-----------< 174    ( 03-26 @ 16:26 )             32.2<L>                  PE:  General: NAD  CV: RRR  Resp: CTAB  Abdomen: Mildly distended, appropriately tender, Fundus firm, incision c/d/i. puckering of skin at level of incision near umbilicus. no bulge noted. no fluctuance   : Nl lochia  Extremities: No erythema, no pitting edema    A/P: 32yo POD#1 s/p rCCS, RS, L tubal ligation, cysto, ucaths with skin puckering at site of incision.  Patient is stable and doing well post-operatively.   - Reassured by normal exam and discussed with pt low c/f hematoma, fluid collection, or hernia. Likely skin puckering. Advised pt to monitor   - Continue regular diet  - Increase ambulation  - Continue motrin, tylenol, oxycodone PRN for pain control.      Pt seen with Dr. iAken PGY4 and d/w Dr. Kednrick Holt MD PGY1

## 2024-03-28 LAB
APPEARANCE UR: ABNORMAL
BACTERIA # UR AUTO: NEGATIVE /HPF — SIGNIFICANT CHANGE UP
BILIRUB UR-MCNC: NEGATIVE — SIGNIFICANT CHANGE UP
CAST: 1 /LPF — SIGNIFICANT CHANGE UP (ref 0–4)
COLOR SPEC: ABNORMAL
DIFF PNL FLD: ABNORMAL
GLUCOSE UR QL: NEGATIVE MG/DL — SIGNIFICANT CHANGE UP
KETONES UR-MCNC: NEGATIVE MG/DL — SIGNIFICANT CHANGE UP
LEUKOCYTE ESTERASE UR-ACNC: ABNORMAL
NITRITE UR-MCNC: NEGATIVE — SIGNIFICANT CHANGE UP
PH UR: 6 — SIGNIFICANT CHANGE UP (ref 5–8)
PROT UR-MCNC: 30 MG/DL
RBC CASTS # UR COMP ASSIST: 985 /HPF — HIGH (ref 0–4)
REVIEW: SIGNIFICANT CHANGE UP
SP GR SPEC: 1.02 — SIGNIFICANT CHANGE UP (ref 1–1.03)
SQUAMOUS # UR AUTO: 13 /HPF — HIGH (ref 0–5)
UROBILINOGEN FLD QL: 1 MG/DL — SIGNIFICANT CHANGE UP (ref 0.2–1)
WBC UR QL: 20 /HPF — HIGH (ref 0–5)
YEAST-LIKE CELLS: PRESENT

## 2024-03-28 RX ORDER — IBUPROFEN 200 MG
600 TABLET ORAL EVERY 6 HOURS
Refills: 0 | Status: DISCONTINUED | OUTPATIENT
Start: 2024-03-28 | End: 2024-03-30

## 2024-03-28 RX ADMIN — HEPARIN SODIUM 5000 UNIT(S): 5000 INJECTION INTRAVENOUS; SUBCUTANEOUS at 18:39

## 2024-03-28 RX ADMIN — Medication 975 MILLIGRAM(S): at 09:29

## 2024-03-28 RX ADMIN — Medication 30 MILLIGRAM(S): at 07:19

## 2024-03-28 RX ADMIN — Medication 30 MILLIGRAM(S): at 06:19

## 2024-03-28 RX ADMIN — Medication 30 MILLIGRAM(S): at 00:19

## 2024-03-28 RX ADMIN — Medication 600 MILLIGRAM(S): at 22:55

## 2024-03-28 RX ADMIN — Medication 975 MILLIGRAM(S): at 14:39

## 2024-03-28 RX ADMIN — Medication 975 MILLIGRAM(S): at 03:28

## 2024-03-28 RX ADMIN — SIMETHICONE 80 MILLIGRAM(S): 80 TABLET, CHEWABLE ORAL at 06:19

## 2024-03-28 RX ADMIN — SIMETHICONE 80 MILLIGRAM(S): 80 TABLET, CHEWABLE ORAL at 14:39

## 2024-03-28 RX ADMIN — HEPARIN SODIUM 5000 UNIT(S): 5000 INJECTION INTRAVENOUS; SUBCUTANEOUS at 06:19

## 2024-03-28 RX ADMIN — SIMETHICONE 80 MILLIGRAM(S): 80 TABLET, CHEWABLE ORAL at 20:21

## 2024-03-28 RX ADMIN — Medication 30 MILLIGRAM(S): at 13:30

## 2024-03-28 RX ADMIN — Medication 975 MILLIGRAM(S): at 08:51

## 2024-03-28 RX ADMIN — Medication 975 MILLIGRAM(S): at 15:10

## 2024-03-28 RX ADMIN — Medication 975 MILLIGRAM(S): at 04:28

## 2024-03-28 RX ADMIN — Medication 600 MILLIGRAM(S): at 18:40

## 2024-03-28 RX ADMIN — Medication 975 MILLIGRAM(S): at 21:48

## 2024-03-28 RX ADMIN — Medication 30 MILLIGRAM(S): at 13:03

## 2024-03-28 RX ADMIN — Medication 975 MILLIGRAM(S): at 20:20

## 2024-03-28 RX ADMIN — Medication 600 MILLIGRAM(S): at 23:49

## 2024-03-28 NOTE — PROGRESS NOTE ADULT - SUBJECTIVE AND OBJECTIVE BOX
OB Progress Note: rCCS,RS,LTL , POD#2    S: 32yo on POD#2 s/p rCCS, RS, L tubal ligation, cysto, ucaths. Pain is well controlled. Endorsed some burning with urination this morning, but no pain. She is tolerating a regular diet and passing flatus. She is voiding spontaneously, and ambulating without difficulty. Denies chest pain, shortness of breath. Denies headaches, blurry vision, epigastric pain, weakness. Denies nausea/vomiting.    O:  Vitals:  Vital Signs Last 24 Hrs  T(C): 36.9 (28 Mar 2024 05:00), Max: 37.6 (27 Mar 2024 16:42)  T(F): 98.4 (28 Mar 2024 05:00), Max: 99.6 (27 Mar 2024 16:42)  HR: 80 (28 Mar 2024 05:00) (80 - 105)  BP: 103/70 (28 Mar 2024 05:00) (90/57 - 106/72)  BP(mean): --  RR: 18 (28 Mar 2024 05:00) (18 - 18)  SpO2: 98% (28 Mar 2024 05:00) (97% - 98%)    Parameters below as of 28 Mar 2024 05:00  Patient On (Oxygen Delivery Method): room air        MEDICATIONS  (STANDING):  acetaminophen     Tablet .. 975 milliGRAM(s) Oral <User Schedule>  diphtheria/tetanus/pertussis (acellular) Vaccine (Adacel) 0.5 milliLiter(s) IntraMuscular once  heparin   Injectable 5000 Unit(s) SubCutaneous every 12 hours  ibuprofen  Tablet. 600 milliGRAM(s) Oral every 6 hours  influenza   Vaccine 0.5 milliLiter(s) IntraMuscular once  ketorolac   Injectable 30 milliGRAM(s) IV Push every 6 hours  lactated ringers. 1000 milliLiter(s) (125 mL/Hr) IV Continuous <Continuous>  oxytocin Infusion 333.333 milliUNIT(s)/Min (1000 mL/Hr) IV Continuous <Continuous>      MEDICATIONS  (PRN):  diphenhydrAMINE 25 milliGRAM(s) Oral every 6 hours PRN Pruritus  lanolin Ointment 1 Application(s) Topical every 6 hours PRN Sore Nipples  magnesium hydroxide Suspension 30 milliLiter(s) Oral two times a day PRN Constipation  oxyCODONE    IR 5 milliGRAM(s) Oral once PRN Moderate to Severe Pain (4-10)  oxyCODONE    IR 5 milliGRAM(s) Oral every 3 hours PRN Moderate to Severe Pain (4-10)  simethicone 80 milliGRAM(s) Chew every 4 hours PRN Gas      Labs:  Blood type: A Positive  Rubella IgG: RPR: Negative                          9.6<L>   9.24 >-----------< 149<L>    ( 03-27 @ 06:21 )             28.4<L>                        10.9<L>   16.86<H> >-----------< 174    ( 03-26 @ 16:26 )             32.2<L>                  PE:  General: NAD  Abdomen: Soft, appropriately tender, incision c/d/i. No suprapubic tenderness  Extremities: No erythema, no pitting edema

## 2024-03-28 NOTE — PROGRESS NOTE ADULT - ASSESSMENT
A/P: 32yo POD#2 s/p rCCS, RS, L tubal ligation, cysto, ucaths.  Patient is stable and doing well post-operatively.      #postpartum care  - Sending urine studies for assessment of dysuria  - Continue regular diet.  - Increase ambulation.  - HSQ, venodynes for DVT prophylaxis  - Continue motrin, tylenol, oxycodone PRN for pain control      Annalisa Berry, PGY1

## 2024-03-29 ENCOUNTER — TRANSCRIPTION ENCOUNTER (OUTPATIENT)
Age: 34
End: 2024-03-29

## 2024-03-29 RX ORDER — ACETAMINOPHEN 500 MG
3 TABLET ORAL
Qty: 0 | Refills: 0 | DISCHARGE
Start: 2024-03-29

## 2024-03-29 RX ORDER — FAMOTIDINE 10 MG/ML
1 INJECTION INTRAVENOUS
Refills: 0 | DISCHARGE

## 2024-03-29 RX ORDER — ASPIRIN/CALCIUM CARB/MAGNESIUM 324 MG
2 TABLET ORAL
Refills: 0 | DISCHARGE

## 2024-03-29 RX ORDER — IBUPROFEN 200 MG
1 TABLET ORAL
Qty: 0 | Refills: 0 | DISCHARGE
Start: 2024-03-29

## 2024-03-29 RX ORDER — OXYCODONE HYDROCHLORIDE 5 MG/1
1 TABLET ORAL
Qty: 5 | Refills: 0
Start: 2024-03-29

## 2024-03-29 RX ADMIN — Medication 975 MILLIGRAM(S): at 10:27

## 2024-03-29 RX ADMIN — Medication 975 MILLIGRAM(S): at 08:53

## 2024-03-29 RX ADMIN — Medication 975 MILLIGRAM(S): at 04:41

## 2024-03-29 RX ADMIN — Medication 600 MILLIGRAM(S): at 17:33

## 2024-03-29 RX ADMIN — Medication 600 MILLIGRAM(S): at 13:00

## 2024-03-29 RX ADMIN — Medication 600 MILLIGRAM(S): at 06:04

## 2024-03-29 RX ADMIN — Medication 975 MILLIGRAM(S): at 03:56

## 2024-03-29 RX ADMIN — HEPARIN SODIUM 5000 UNIT(S): 5000 INJECTION INTRAVENOUS; SUBCUTANEOUS at 17:33

## 2024-03-29 RX ADMIN — Medication 600 MILLIGRAM(S): at 23:41

## 2024-03-29 RX ADMIN — Medication 975 MILLIGRAM(S): at 21:03

## 2024-03-29 RX ADMIN — Medication 975 MILLIGRAM(S): at 14:37

## 2024-03-29 RX ADMIN — HEPARIN SODIUM 5000 UNIT(S): 5000 INJECTION INTRAVENOUS; SUBCUTANEOUS at 06:03

## 2024-03-29 RX ADMIN — Medication 975 MILLIGRAM(S): at 15:07

## 2024-03-29 RX ADMIN — Medication 600 MILLIGRAM(S): at 06:41

## 2024-03-29 RX ADMIN — Medication 600 MILLIGRAM(S): at 18:01

## 2024-03-29 RX ADMIN — Medication 975 MILLIGRAM(S): at 21:09

## 2024-03-29 RX ADMIN — Medication 600 MILLIGRAM(S): at 12:41

## 2024-03-29 NOTE — PROGRESS NOTE ADULT - SUBJECTIVE AND OBJECTIVE BOX
Called to evaluate this patient with complaint of back pain.   Patient delivered 3 days ago, and interestingly, indicates that her back pain began two days later (yesterday).   The pain, she says, radiates to the right buttock.   She denies any motor weakness she denies any sensory deficit.  She has a known bulging disc at L4/5 with pain usually radiating down the left side.  She indicates that she had a radiating paresthesia down the right side during the neuraxial placement (combined spinal epidural).   The procedure was many hours long.    Physical examination: as above, in addition, 3 puncture wound sites.  Clean, clear and dry.      Impression:  Most likely musculoskeletal pain in a patient with a known bulging disc aggravated by being sedentary now for a number of days.  Encouraged the patient to continue taking the acetaminophen and NSAID as being prescribed for her section.    Discussed with the patient, discussed with Dr. Marks.    Thank you for the courtesy of this consultation.     G. Palleschi

## 2024-03-29 NOTE — DISCHARGE NOTE OB - NS MD DC FALL RISK RISK
For information on Fall & Injury Prevention, visit: [email or web address] OR  [email or web address] OR  https://www.cdc.gov/steadi/patient.html

## 2024-03-29 NOTE — DISCHARGE NOTE OB - ADDITIONAL INSTRUCTIONS
Instructions:  Make your follow-up appointment with your doctor as ordered.   No heavy lifting, driving, or strenuous activity for 6 weeks.   Nothing per vagina such as tampons, intercourse, douches or tub baths for 6 weeks or until you see your doctor.   Call your doctor with any signs and symptoms of infection such as fever, chills, nausea or vomiting. Call your doctor with redness or swelling at the incision site, fluid leakage or wound separation. Call your doctor if you're unable to tolerate food, increase in vaginal bleeding, or have difficulty urinating. Call your doctor if you have pain that is not relieved by your prescribed medications. Notify your doctor with any of concerns.    Follow up:    NS  Please f/u in 2 weeks for an incision check and for a postpartum appointment in 4-6 weeks @ the [facility] located at [address] [place] [address], [place], [address]. Please call the office for an appointment ([phone]).

## 2024-03-29 NOTE — DISCHARGE NOTE OB - CARE PLAN
Principal Discharge DX:	 delivery delivered  Assessment and plan of treatment:	Instructions:  Make your follow-up appointment with your doctor as ordered.   No heavy lifting, driving, or strenuous activity for 6 weeks.   Nothing per vagina such as tampons, intercourse, douches or tub baths for 6 weeks or until you see your doctor.   Call your doctor with any signs and symptoms of infection such as fever, chills, nausea or vomiting. Call your doctor with redness or swelling at the incision site, fluid leakage or wound separation. Call your doctor if you're unable to tolerate food, increase in vaginal bleeding, or have difficulty urinating. Call your doctor if you have pain that is not relieved by your prescribed medications. Notify your doctor with any of concerns.    Follow up:    NS  Please f/u in 2 weeks for an incision check and for a postpartum appointment in 4-6 weeks @ the [facility] located at [address] [place] [address], 2nd floor, [place], [address]. Please call the office for an appointment ([phone]).   1

## 2024-03-29 NOTE — PROGRESS NOTE ADULT - SUBJECTIVE AND OBJECTIVE BOX
OB Postpartum Note:  Delivery, POD#3    S: 32yo POD#3 s/p rCCS, RS, L tubal ligation, cysto, ucaths. The patient feels well. States all urinary sx have resolved. Pain is well controlled, tolerating a regular diet,  passing flatus, voiding spontaneously, and ambulating without difficulty. Denies heavy vaginal bleeding, CP/SOB, lightheadedness/dizziness, N/V.     O:  Vitals:  Vital Signs Last 24 Hrs  T(C): 36.7 (29 Mar 2024 05:44), Max: 37.1 (28 Mar 2024 13:46)  T(F): 98.1 (29 Mar 2024 05:44), Max: 98.8 (28 Mar 2024 13:46)  HR: 71 (29 Mar 2024 05:44) (71 - 83)  BP: 100/64 (29 Mar 2024 05:44) (100/64 - 105/69)  BP(mean): --  RR: 18 (29 Mar 2024 05:44) (18 - 18)  SpO2: 98% (29 Mar 2024 05:44) (98% - 99%)    Parameters below as of 29 Mar 2024 05:44  Patient On (Oxygen Delivery Method): room air        MEDICATIONS  (STANDING):  acetaminophen     Tablet .. 975 milliGRAM(s) Oral <User Schedule>  diphtheria/tetanus/pertussis (acellular) Vaccine (Adacel) 0.5 milliLiter(s) IntraMuscular once  heparin   Injectable 5000 Unit(s) SubCutaneous every 12 hours  ibuprofen  Tablet. 600 milliGRAM(s) Oral every 6 hours  influenza   Vaccine 0.5 milliLiter(s) IntraMuscular once  lactated ringers. 1000 milliLiter(s) (125 mL/Hr) IV Continuous <Continuous>  oxytocin Infusion 333.333 milliUNIT(s)/Min (1000 mL/Hr) IV Continuous <Continuous>    MEDICATIONS  (PRN):  diphenhydrAMINE 25 milliGRAM(s) Oral every 6 hours PRN Pruritus  lanolin Ointment 1 Application(s) Topical every 6 hours PRN Sore Nipples  magnesium hydroxide Suspension 30 milliLiter(s) Oral two times a day PRN Constipation  oxyCODONE    IR 5 milliGRAM(s) Oral once PRN Moderate to Severe Pain (4-10)  oxyCODONE    IR 5 milliGRAM(s) Oral every 3 hours PRN Moderate to Severe Pain (4-10)  simethicone 80 milliGRAM(s) Chew every 4 hours PRN Gas      LABS:  Blood type: A Positive  Rubella IgG: RPR: Negative                          9.6<L>   9.24 >-----------< 149<L>    (  @ 06:21 )             28.4<L>                        10.9<L>   16.86<H> >-----------< 174    (  @ 16:26 )             32.2<L>                  Physical exam:  Gen: NAD  CV: RRR  Resp: CTAB  Abdomen: Soft, nontender, no distension , firm uterine fundus at umbilicus.  Incision: Clean, dry, and intact (classical vertical)    : Nl lochia  Ext: No calf tenderness. No edema

## 2024-03-29 NOTE — DISCHARGE NOTE OB - CARE PROVIDER_API CALL
GYN CLINIC,   [address] [place] [address] [address]  [place] [address]  [phone]  Phone: (   )    -  Fax: (   )    -  Follow Up Time:

## 2024-03-29 NOTE — DISCHARGE NOTE OB - MEDICATION SUMMARY - MEDICATIONS TO TAKE
I will START or STAY ON the medications listed below when I get home from the hospital:    ibuprofen 600 mg oral tablet  -- 1 tab(s) by mouth every 6 hours as needed for pain  -- Indication: For pain    acetaminophen 325 mg oral tablet  -- 3 tab(s) by mouth every 6 hours as needed for pain  -- Indication: For pain    Prenatal Multivitamins with Folic Acid 0.4 mg oral tablet  -- 1 tab(s) by mouth once a day  -- Indication: For postpartum care   I will START or STAY ON the medications listed below when I get home from the hospital:    ibuprofen 600 mg oral tablet  -- 1 tab(s) by mouth every 6 hours as needed for pain  -- Indication: For pain    acetaminophen 325 mg oral tablet  -- 3 tab(s) by mouth every 6 hours as needed for pain  -- Indication: For pain    oxyCODONE 5 mg oral tablet  -- 1 tab(s) by mouth every 6 hours as needed for  severe pain MDD: 4  -- Indication: For Severe pain    Prenatal Multivitamins with Folic Acid 0.4 mg oral tablet  -- 1 tab(s) by mouth once a day  -- Indication: For postpartum care

## 2024-03-29 NOTE — PROGRESS NOTE ADULT - ASSESSMENT
A/P: 34yo POD#3 s/p rCCS, RS, L tubal ligation, cysto, ucaths.  Patient is stable and is doing well post-operatively.  - Continue motrin, tylenol, oxycodone PRN for pain control.  - Increase ambulation  - Continue regular diet  - Discharge planning    #dysuria  - Resolved  - UCx (3/28) with contamination    Art Holt MD PGY1   A/P: 32yo POD#3 s/p rCCS, RS, L tubal ligation, cysto, ucaths.  Patient is stable and is doing well post-operatively.  - Continue motrin, tylenol, oxycodone PRN for pain control.  - Increase ambulation  - Continue regular diet  - Discharge planning    #dysuria  - Resolved  - UCx (3/28) pending    Art Holt MD PGY1

## 2024-03-29 NOTE — DISCHARGE NOTE OB - HOSPITAL COURSE
Patient underwent an uncomplicated rCCS, RS, L tubal ligation, cysto, ucaths. EBL 1250, H/H stable throughout. Patient’s postoperative course was remarkable for dysuria which resolved by POD#3. UA unremarkable, Urine culture pending. She remained hemodynamically stable and afebrile throughout. Upon discharge on POD#3, the patient is ambulating and voiding spontaneously, tolerating oral intake, pain was well controlled with oral medication, and vital signs were stable. Patient's postpartum birth control - patient received right salpingectomy and left tubal ligation. Patient will be following up with [facility] clinic.  Patient underwent an uncomplicated rCCS, RS, L tubal ligation, cysto, ucaths. EBL 1250, H/H stable throughout. Patient’s postoperative course was remarkable for dysuria which resolved by POD#3. UA and culture contaminated, and symptoms completely resolved. She remained hemodynamically stable and afebrile throughout. Upon discharge on POD#4, the patient is ambulating and voiding spontaneously, tolerating oral intake, pain was well controlled with oral medication, and vital signs were stable. Patient's postpartum birth control - patient received right salpingectomy and left tubal ligation. Patient will be following up with [facility] clinic.

## 2024-03-29 NOTE — DISCHARGE NOTE OB - REST! DO NOT DO HEAVY HOUSEWORK, LIFTING OR STRENOUS EXERCISE FOR TWO WEEKS
Surgical Oncology Follow Up       [facility]  CANCER [facility] SURGICAL ONCOLOGY [facility] [address]    Mariaa Prince  1950  [id number]  [address]  CANCER [facility] SURGICAL ONCOLOGY [place]  [place] [address]    Chief Complaint   Patient presents with   • Follow-up       Assessment/Plan   Diagnoses and all orders for this visit:    Malignant neoplasm of lower-inner quadrant of left breast in female, estrogen receptor positive ([facility] [address] )    Use of tamoxifen (Nolvadex)    BRCA negative    Other orders  -     Prolensa 0 07 % SOLN; PLACE 1 DROP IN SURGICAL EYE DAILY POSTOPERATIVELY ACCORDING TO WRITTEN INSTRUCTIONS        Advance Care Planning/Advance Directives:  Discussed disease status, cancer treatment plans and/or cancer treatment goals with the patient  Oncology History:    Oncology History   Malignant neoplasm of lower-inner quadrant of left breast in female, estrogen receptor positive ([facility] [address] )   1/2/2019 Initial Diagnosis    Malignant neoplasm of lower-inner quadrant of left breast in female, estrogen receptor positive [facility])      Chemotherapy    April 2019:    Adjuvant  Taxotere and Cytoxan once every 3 weeks for 4 cycles followed by aromatase inhibitor     4/11/2019 - 7/3/2019 Chemotherapy    pegfilgrastim (NEULASTA) subcutaneous injection 6 mg, 6 mg, Subcutaneous, Once, 4 of 4 cycles  Dose modification: 4 mg (original dose 6 mg, Cycle 3)  Administration: 4 mg (5/24/2019), 4 mg (6/14/2019)  cyclophosphamide (CYTOXAN) 1,176 mg in sodium chloride 0 9 % 250 mL IVPB, 600 mg/m2 = 1,176 mg, Intravenous, Once, 4 of 4 cycles  Administration: 1,176 mg (5/23/2019), 1,176 mg (6/13/2019)  DOCEtaxel (TAXOTERE) 147 mg in sodium chloride 0 9 % 250 mL chemo infusion, 75 mg/m2 = 147 mg, Intravenous, Once, 4 of 4 cycles  Administration: 147 mg (5/23/2019), 147 mg (6/13/2019)         History of Present Illness: Breast cancer follow-up, no breast referable concerns, continues on tamoxifen  -Interval History: None    Review of Systems:  Review of Systems   Constitutional: Negative  Negative for appetite change and fever  Eyes: Negative  Respiratory: Negative for shortness of breath  Cardiovascular: Negative  Gastrointestinal: Negative  Endocrine: Negative  Genitourinary: Negative  Musculoskeletal: Negative  Negative for arthralgias and myalgias  Skin: Negative  Allergic/Immunologic: Negative  Neurological: Negative  Hematological: Negative  Negative for adenopathy  Does not bruise/bleed easily  Psychiatric/Behavioral: Negative          Patient Active Problem List   Diagnosis   • Plantar fasciitis of right foot   • Atypical chest pain   • Epigastric pain   • Elevated LFTs   • Nausea   • Excessive VIP secretion   • Thyroid nodule   • Malignant neoplasm of lower-inner quadrant of left breast in female, estrogen receptor positive ([facility] [address] )   • Family history of breast cancer in sister   • BRCA negative   • Folliculitis   • Chemotherapy induced neutropenia (HCC)   • Urinary tract infection with hematuria   • Dysuria   • Vitamin D deficiency   • Impaired fasting blood sugar   • Bilateral carpal tunnel syndrome   • Osteoarthritis of multiple joints   • Drug-induced polyneuropathy (HCC)   • Arthritis   • Use of tamoxifen (Nolvadex)   • Gastroesophageal reflux disease   • Breast reconstruction deformity   • Osteopenia   • Other insomnia     Past Medical History:   Diagnosis Date   • Allergies    • Arthritis    • Breast cancer ([facility] [address] )    • Carpal tunnel syndrome on right    • Chemotherapy induced neutropenia ([facility] [address] ) 05/23/2019   • Gastroesophageal reflux disease 08/06/2020   • GERD (gastroesophageal reflux disease)    • Lumbar disc disease    • Trigger thumb     right   • Vitamin D deficiency    • Wears glasses      Past Surgical History:   Procedure Laterality Date   • BREAST BIOPSY Left 01/02/2019    invasive ductal carcinoma   • COLONOSCOPY     • HYSTERECTOMY      age 41 - abdominal   • LIPOSUCTION W/ FAT INJECTION Bilateral 08/06/2020    Procedure: FAT GRAFTING;  Surgeon: Meme Enriquez MD;  [facility];  Service: Plastics   • OOPHORECTOMY Bilateral     age 41   • NE BIOPSY/EXCISION, LYMPH NODE(S) Left 02/08/2019    Procedure: BIOPSY LYMPH NODE SENTINEL;  Surgeon: Tameka aMurer MD;  [facility];  Service: Surgical Oncology   • NE EDG US EXAM SURGICAL ALTER STOM DUODENUM/JEJUNUM N/A 02/02/2017    Procedure: RADIAL ENDOSCOPIC U/S;  Surgeon: Jonathan Barrientos MD;  [facility]: [facility]; Service: Gastroenterology   • NE IMPLNT BIO IMPLNT FOR SOFT TISSUE REINFORCEMENT Bilateral 02/08/2019    Procedure: RECONSTRUCTION BREAST W/ IMPLANT;  Surgeon: Meme Enriquez MD;  [facility];  Service: Plastics   • NE INSJ/RPLCMT BREAST IMPLANT SEP DAY MASTECTOMY Bilateral 09/27/2019    Procedure: IMPLANT EXPANDER EXCHANGE;  Surgeon: Meme Enriquez MD;  [facility];  Service: Plastics   • NE MASTECTOMY, SIMPLE, COMPLETE N/A 02/08/2019    Procedure: LEFT breast mastectomy; RIGHT breast prophylactic mastectomy;  Surgeon: Tameka Maurer MD;  [facility];  Service: Surgical Oncology   • NE PART EXCIS PLANTAR FASCIA Right 10/20/2016    Procedure:  OPEN RELEASE FASCIA PLANTAR ,CUTTING BOTTOM OF ARCH,INSTEP ;  Surgeon: Ernestina Torres DPM;  [facility];  Service: Podiatry   • NE ZUNILDA-IMPLANT CAPSULECTOMY BREAST COMPLETE Bilateral 09/27/2019    Procedure: CAPSULECTOMY;  Surgeon: Meme Enriquez MD;  [facility];  Service: Plastics   • NE REVISION OF RECONSTRUCTED BREAST Bilateral 08/06/2020    Procedure: BREAST LATERAL STANDING EXCISION SKIN DEFORMITIES POST RECON ;  Surgeon: Meme Enriquez MD;  [facility];  Service: Plastics   • NE TISSUE EXPANDER PLACEMENT BREAST RECONSTRUCTION Bilateral 02/08/2019    Procedure: INSERTION/PLACEMENT TISSUE EXPANDER (EXCHANGE);   Surgeon: Meme Enriquez MD;  [facility];  Service: Plastics   • THUMB ARTHROSCOPY     • UPPER GASTROINTESTINAL ENDOSCOPY     • US GUIDED BREAST BIOPSY LEFT COMPLETE Left 01/02/2019   • WISDOM TOOTH EXTRACTION       Family History   Problem Relation Age of Onset   • Brain cancer Mother 67   • Prostate cancer Father 66   • Breast cancer Sister 54   • Breast cancer additional onset Sister 72   • Breast cancer Sister 72   • Prostate cancer Brother 62     Social History     Socioeconomic History   • Marital status: Single     Spouse name: Not on file   • Number of children: Not on file   • Years of education: Not on file   • Highest education level: Not on file   Occupational History   • Not on file   Tobacco Use   • Smoking status: Never   • Smokeless tobacco: Never   Vaping Use   • Vaping Use: Never used   Substance and Sexual Activity   • Alcohol use: Not Currently     Alcohol/week: 1 0 standard drink     Types: 1 Glasses of wine per week     Comment: 3 x yearly   • Drug use: No   • Sexual activity: Not on file   Other Topics Concern   • Not on file   Social History Narrative   • Not on file     Social Determinants of Health     Financial Resource Strain: Not on file   Food Insecurity: Not on file   Transportation Needs: Not on file   Physical Activity: Not on file   Stress: Not on file   Social Connections: Not on file   Intimate Partner Violence: Not on file   Housing Stability: Not on file       Current Outpatient Medications:   •  CALCIUM PO, Take 1,200 mg by mouth daily, Disp: , Rfl:   •  Cholecalciferol (VITAMIN D PO), Take 1 25 mg by mouth every 30 (thirty) days, Disp: , Rfl:   •  Cyanocobalamin (VITAMIN B 12 PO), Take 500 mcg by mouth daily , Disp: , Rfl:   •  ergocalciferol (VITAMIN D2) 50,000 units, Take 1 capsule (50,000 Units total) by mouth every 30 (thirty) days, Disp: 3 capsule, Rfl: 3  •  guaifenesin-codeine (GUAIFENESIN AC) 100-10 MG/5ML liquid, Take 5 mL by mouth 3 (three) times a day as needed for cough, Disp: 120 mL, Rfl: 0  •  lidocaine (LIDODERM) 5 %, Apply 1 patch topically daily Remove & Discard patch within 12 hours or as directed by MD, Disp: 15 patch, Rfl: 1  •  montelukast (SINGULAIR) 10 mg tablet, Take 1 tablet (10 mg total) by mouth daily at bedtime, Disp: 30 tablet, Rfl: 5  •  Multiple Vitamins-Minerals (MULTIVITAL PO), Take by mouth daily , Disp: , Rfl:   •  ofloxacin (OCUFLOX) 0 3 % ophthalmic solution, , Disp: , Rfl:   •  prednisoLONE acetate (PRED FORTE) 1 % ophthalmic suspension, , Disp: , Rfl:   •  Prolensa 0 07 % SOLN, PLACE 1 DROP IN SURGICAL EYE DAILY POSTOPERATIVELY ACCORDING TO WRITTEN INSTRUCTIONS, Disp: , Rfl:   •  Restasis 0 05 % ophthalmic emulsion, , Disp: , Rfl:   •  tamoxifen (NOLVADEX) 20 mg tablet, Take 1 tablet (20 mg total) by mouth daily, Disp: 90 tablet, Rfl: 3  •  diphenhydrAMINE (BENADRYL) 25 mg tablet, Take 1 tablet (25 mg total) by mouth daily at bedtime (Patient not taking: Reported on 11/11/2022), Disp: 30 tablet, Rfl: 0  •  docusate sodium (COLACE) 100 mg capsule, Take 1 capsule (100 mg total) by mouth 2 (two) times a day (Patient taking differently: Take 100 mg by mouth if needed), Disp: 200 capsule, Rfl: 3  •  doxylamine (UNISOM) 25 MG tablet, Take 1 tablet (25 mg total) by mouth daily at bedtime as needed for sleep (Patient not taking: Reported on 9/23/2022), Disp: 30 tablet, Rfl: 0  •  Na Sulfate-K Sulfate-Mg Sulf 17 5-3 13-1 6 GM/177ML SOLN, Take 1 kit by mouth once for 1 dose (Patient not taking: Reported on 8/9/2022), Disp: 354 mL, Rfl: 0  •  Soolantra 1 % CREA,   (Patient not taking: Reported on 5/5/2022), Disp: , Rfl:   •  triamcinolone (KENALOG) 0 1 % ointment, Apply topically 2 (two) times a day for 7 days, Disp: 80 g, Rfl: 0  Allergies   Allergen Reactions   • Latex Anaphylaxis   • Shellfish-Derived Products - Food Allergy Anaphylaxis       The following portions of the patient's history were reviewed and updated as appropriate: allergies, current medications, past family history, past medical history, past social history, past surgical history and problem list         Vitals:    12/21/22 1528   BP: 110/78   Pulse: 73   Resp: 16   Temp: (!) 95 9 °F (35 5 °C)   SpO2: 97%       Physical Exam  Constitutional:       General: She is not in acute distress  Appearance: She is well-developed  HENT:      Head: Normocephalic and atraumatic  Cardiovascular:      Heart sounds: Normal heart sounds  Pulmonary:      Breath sounds: Normal breath sounds  Chest:   Breasts:     Right: Skin change ( Mastectomy scar with implant) present  No mass or tenderness  Left: Skin change ( Mastectomy scar with implant) present  No mass or tenderness  Abdominal:      Palpations: Abdomen is soft  Lymphadenopathy:      Upper Body:      Right upper body: No supraclavicular, axillary or pectoral adenopathy  Left upper body: No supraclavicular, axillary or pectoral adenopathy  Neurological:      Mental Status: She is alert and oriented to person, place, and time  Psychiatric:         Mood and Affect: Mood normal              Discussion/Summary: 82-year-old female status post bilateral mastectomy and reconstruction secondary to a left-sided invasive ductal carcinoma  She did have adjuvant chemo therapy and initially was on an AI but discontinued this secondary to side effects  She is currently on tamoxifen  There is no evidence of disease based on exam today  Her prior genetic testing was negative  This was a panel test done in 2019  She will be seen in 6 months in our survivorship program   I will plan to see her again in 1 year or sooner should the need arise  Statement Selected

## 2024-03-29 NOTE — DISCHARGE NOTE OB - PLAN OF CARE
Instructions:  Make your follow-up appointment with your doctor as ordered.   No heavy lifting, driving, or strenuous activity for 6 weeks.   Nothing per vagina such as tampons, intercourse, douches or tub baths for 6 weeks or until you see your doctor.   Call your doctor with any signs and symptoms of infection such as fever, chills, nausea or vomiting. Call your doctor with redness or swelling at the incision site, fluid leakage or wound separation. Call your doctor if you're unable to tolerate food, increase in vaginal bleeding, or have difficulty urinating. Call your doctor if you have pain that is not relieved by your prescribed medications. Notify your doctor with any of concerns.    Follow up:    NS  Please f/u in 2 weeks for an incision check and for a postpartum appointment in 4-6 weeks @ the [facility] located at [address] [place] [address], [place], [address]. Please call the office for an appointment ([phone]).

## 2024-03-29 NOTE — DISCHARGE NOTE OB - MEDICATION SUMMARY - MEDICATIONS TO STOP TAKING
No I will STOP taking the medications listed below when I get home from the hospital:    aspirin 81 mg oral capsule  -- 2 cap(s) by mouth once a day

## 2024-03-29 NOTE — DISCHARGE NOTE OB - PROVIDER TOKENS
FREE:[LAST:[GYN CLINIC],PHONE:[(   )    -],FAX:[(   )    -],ADDRESS:[[address] [place] [address]  [phone]]]

## 2024-03-29 NOTE — DISCHARGE NOTE OB - PATIENT PORTAL LINK FT
You can access the FollowMyHealth Patient Portal offered by [facility] by registering at the following website: [email or web address]. By joining [facility]’s FollowMyHealth portal, you will also be able to view your health information using other applications (apps) compatible with our system.

## 2024-03-30 VITALS
SYSTOLIC BLOOD PRESSURE: 118 MMHG | RESPIRATION RATE: 18 BRPM | OXYGEN SATURATION: 97 % | DIASTOLIC BLOOD PRESSURE: 76 MMHG | HEART RATE: 100 BPM | TEMPERATURE: 98 F

## 2024-03-30 PROCEDURE — C1758: CPT

## 2024-03-30 PROCEDURE — 59050 FETAL MONITOR W/REPORT: CPT

## 2024-03-30 PROCEDURE — 86900 BLOOD TYPING SEROLOGIC ABO: CPT

## 2024-03-30 PROCEDURE — 85025 COMPLETE CBC W/AUTO DIFF WBC: CPT

## 2024-03-30 PROCEDURE — 86850 RBC ANTIBODY SCREEN: CPT

## 2024-03-30 PROCEDURE — 86780 TREPONEMA PALLIDUM: CPT

## 2024-03-30 PROCEDURE — 85730 THROMBOPLASTIN TIME PARTIAL: CPT

## 2024-03-30 PROCEDURE — 88307 TISSUE EXAM BY PATHOLOGIST: CPT

## 2024-03-30 PROCEDURE — C1765: CPT

## 2024-03-30 PROCEDURE — 85384 FIBRINOGEN ACTIVITY: CPT

## 2024-03-30 PROCEDURE — 59025 FETAL NON-STRESS TEST: CPT

## 2024-03-30 PROCEDURE — 87086 URINE CULTURE/COLONY COUNT: CPT

## 2024-03-30 PROCEDURE — 88302 TISSUE EXAM BY PATHOLOGIST: CPT

## 2024-03-30 PROCEDURE — 87186 SC STD MICRODIL/AGAR DIL: CPT

## 2024-03-30 PROCEDURE — C1769: CPT

## 2024-03-30 PROCEDURE — 86901 BLOOD TYPING SEROLOGIC RH(D): CPT

## 2024-03-30 PROCEDURE — 87077 CULTURE AEROBIC IDENTIFY: CPT

## 2024-03-30 PROCEDURE — 86923 COMPATIBILITY TEST ELECTRIC: CPT

## 2024-03-30 PROCEDURE — 36415 COLL VENOUS BLD VENIPUNCTURE: CPT

## 2024-03-30 PROCEDURE — 81001 URINALYSIS AUTO W/SCOPE: CPT

## 2024-03-30 PROCEDURE — 85610 PROTHROMBIN TIME: CPT

## 2024-03-30 RX ORDER — FAMOTIDINE 10 MG/ML
20 INJECTION INTRAVENOUS DAILY
Refills: 0 | Status: DISCONTINUED | OUTPATIENT
Start: 2024-03-30 | End: 2024-03-30

## 2024-03-30 RX ADMIN — Medication 975 MILLIGRAM(S): at 09:26

## 2024-03-30 RX ADMIN — HEPARIN SODIUM 5000 UNIT(S): 5000 INJECTION INTRAVENOUS; SUBCUTANEOUS at 06:21

## 2024-03-30 RX ADMIN — Medication 600 MILLIGRAM(S): at 12:23

## 2024-03-30 RX ADMIN — Medication 600 MILLIGRAM(S): at 13:20

## 2024-03-30 RX ADMIN — Medication 600 MILLIGRAM(S): at 00:50

## 2024-03-30 RX ADMIN — Medication 600 MILLIGRAM(S): at 06:21

## 2024-03-30 RX ADMIN — Medication 975 MILLIGRAM(S): at 03:50

## 2024-03-30 RX ADMIN — Medication 975 MILLIGRAM(S): at 04:50

## 2024-03-30 RX ADMIN — Medication 975 MILLIGRAM(S): at 15:23

## 2024-03-30 RX ADMIN — Medication 975 MILLIGRAM(S): at 10:30

## 2024-03-30 RX ADMIN — FAMOTIDINE 20 MILLIGRAM(S): 10 INJECTION INTRAVENOUS at 10:32

## 2024-03-30 NOTE — PROGRESS NOTE ADULT - ASSESSMENT
A/P: 32yo POD#4 s/p rCCS, RS, L tubal ligation, cysto, ucaths.  Patient is stable and is doing well post-operatively.  - Continue motrin, tylenol, oxycodone PRN for pain control.  - Increase ambulation  - Continue regular diet  - Anticipate discharge this afternoon     #Dysuria  - UA (3/28) likely contaminated  - UCx (3/28) G+ rods  - Patient asymptomatic and UA most likely contaminated. No antibiotics for now. Patient advised to call the clinic if symptoms return.     Ann Winchester, PGY1

## 2024-03-30 NOTE — PROGRESS NOTE ADULT - ATTENDING COMMENTS
pt seen and examined   cleared for discharge  dysuria resolved  back pain resolved  f/u 2 weeks in [facility]
Obstetrical  8am-6pm:  Patient seen and examined by me. Agree with above resident note. Incision clean, dry and intact. (+) flatus  Donnell LEON
Obstetrical  8am-6pm:  Patient seen and examined by me. Agree with above resident note. Incision clean, dry and intact. New complaint of back/spine pain. See by Dr. Palleschi who believes this is musculoskeletal - continue Rx with Motrin and Tylenol.  Donnell LEON
Obstetrical  8am-6pm:  Patient seen and examined by me. Agree with above resident note. Incision clean, dry and intact. No flatus but feels better than earlier and is requesting regular diet. To be ordered.  Donnell LEON

## 2024-03-30 NOTE — PROGRESS NOTE ADULT - SUBJECTIVE AND OBJECTIVE BOX
OB Progress Note: CCS, POD#4    S: 32yo POD#4 s/p rCCS, RS, LTL. Pain is well controlled. She is tolerating a regular diet and passing flatus. She is voiding spontaneously, and ambulating without difficulty. Endorses light vaginal bleeding, soaking <1 pad/hr. Denies CP/SOB. Denies lightheadedness/dizziness. Denies N/V.    O:  Vitals:  Vital Signs Last 24 Hrs  T(C): 36.7 (30 Mar 2024 06:18), Max: 36.7 (30 Mar 2024 06:18)  T(F): 98 (30 Mar 2024 06:18), Max: 98 (30 Mar 2024 06:18)  HR: 75 (30 Mar 2024 06:18) (72 - 86)  BP: 101/69 (30 Mar 2024 06:18) (101/69 - 118/74)  BP(mean): --  RR: 18 (30 Mar 2024 06:18) (18 - 18)  SpO2: 100% (30 Mar 2024 06:18) (98% - 100%)    Parameters below as of 30 Mar 2024 06:18  Patient On (Oxygen Delivery Method): room air        MEDICATIONS  (STANDING):  acetaminophen     Tablet .. 975 milliGRAM(s) Oral <User Schedule>  diphtheria/tetanus/pertussis (acellular) Vaccine (Adacel) 0.5 milliLiter(s) IntraMuscular once  heparin   Injectable 5000 Unit(s) SubCutaneous every 12 hours  ibuprofen  Tablet. 600 milliGRAM(s) Oral every 6 hours  influenza   Vaccine 0.5 milliLiter(s) IntraMuscular once  lactated ringers. 1000 milliLiter(s) (125 mL/Hr) IV Continuous <Continuous>  oxytocin Infusion 333.333 milliUNIT(s)/Min (1000 mL/Hr) IV Continuous <Continuous>      MEDICATIONS  (PRN):  diphenhydrAMINE 25 milliGRAM(s) Oral every 6 hours PRN Pruritus  lanolin Ointment 1 Application(s) Topical every 6 hours PRN Sore Nipples  magnesium hydroxide Suspension 30 milliLiter(s) Oral two times a day PRN Constipation  oxyCODONE    IR 5 milliGRAM(s) Oral once PRN Moderate to Severe Pain (4-10)  oxyCODONE    IR 5 milliGRAM(s) Oral every 3 hours PRN Moderate to Severe Pain (4-10)  simethicone 80 milliGRAM(s) Chew every 4 hours PRN Gas      Labs:  Blood type: A Positive  Rubella IgG: RPR: Negative                    PE:  General: NAD  Heart: extremities well-perfused  Lungs: breathing normally  Abdomen: Soft, appropriately tender, incision c/d/i, fundus firm  Extremities: No erythema, no pitting edema  Gyn: lochia wnl    A/P: 32yo POD#3 s/p LTCS.  Patient is stable and doing well post-operatively.    - Continue regular diet.  - Increase ambulation.  - Continue motrin, tylenol, oxycodone PRN for pain control.    Ann Winchester, PGY1

## 2024-03-31 LAB
-  AMPICILLIN: SIGNIFICANT CHANGE UP
-  CIPROFLOXACIN: SIGNIFICANT CHANGE UP
-  LEVOFLOXACIN: SIGNIFICANT CHANGE UP
-  NITROFURANTOIN: SIGNIFICANT CHANGE UP
-  TETRACYCLINE: SIGNIFICANT CHANGE UP
-  VANCOMYCIN: SIGNIFICANT CHANGE UP
CULTURE RESULTS: ABNORMAL
METHOD TYPE: SIGNIFICANT CHANGE UP
ORGANISM # SPEC MICROSCOPIC CNT: ABNORMAL
ORGANISM # SPEC MICROSCOPIC CNT: ABNORMAL
SPECIMEN SOURCE: SIGNIFICANT CHANGE UP

## 2024-04-03 DIAGNOSIS — O44.03 COMPLETE PLACENTA PREVIA NOS OR WITHOUT HEMORRHAGE, THIRD TRIMESTER: ICD-10-CM

## 2024-04-03 DIAGNOSIS — O09.93 SUPERVISION OF HIGH RISK PREGNANCY, UNSPECIFIED, THIRD TRIMESTER: ICD-10-CM

## 2024-04-04 ENCOUNTER — NON-APPOINTMENT (OUTPATIENT)
Age: 34
End: 2024-04-04

## 2024-04-04 LAB — SURGICAL PATHOLOGY STUDY: SIGNIFICANT CHANGE UP

## 2024-04-12 ENCOUNTER — OUTPATIENT (OUTPATIENT)
Dept: OUTPATIENT SERVICES | Facility: HOSPITAL | Age: 34
LOS: 1 days | End: 2024-04-12
Payer: MEDICAID

## 2024-04-12 ENCOUNTER — APPOINTMENT (OUTPATIENT)
Dept: MATERNAL FETAL MEDICINE | Facility: CLINIC | Age: 34
End: 2024-04-12

## 2024-04-12 VITALS
WEIGHT: 212 LBS | HEIGHT: 62 IN | SYSTOLIC BLOOD PRESSURE: 92 MMHG | BODY MASS INDEX: 39.01 KG/M2 | DIASTOLIC BLOOD PRESSURE: 62 MMHG | TEMPERATURE: 97.5 F | OXYGEN SATURATION: 98 % | HEART RATE: 82 BPM

## 2024-04-12 DIAGNOSIS — Z51.89 ENCOUNTER FOR OTHER SPECIFIED AFTERCARE: ICD-10-CM

## 2024-04-12 DIAGNOSIS — Z90.49 ACQUIRED ABSENCE OF OTHER SPECIFIED PARTS OF DIGESTIVE TRACT: Chronic | ICD-10-CM

## 2024-04-12 DIAGNOSIS — Z98.890 OTHER SPECIFIED POSTPROCEDURAL STATES: Chronic | ICD-10-CM

## 2024-04-12 PROCEDURE — G0463: CPT

## 2024-04-15 ENCOUNTER — INPATIENT (INPATIENT)
Facility: HOSPITAL | Age: 34
LOS: 1 days | Discharge: HOME | DRG: 556 | End: 2024-04-17
Attending: INTERNAL MEDICINE | Admitting: STUDENT IN AN ORGANIZED HEALTH CARE EDUCATION/TRAINING PROGRAM
Payer: MEDICAID

## 2024-04-15 ENCOUNTER — NON-APPOINTMENT (OUTPATIENT)
Age: 34
End: 2024-04-15

## 2024-04-15 VITALS
DIASTOLIC BLOOD PRESSURE: 81 MMHG | TEMPERATURE: 98 F | RESPIRATION RATE: 20 BRPM | OXYGEN SATURATION: 98 % | HEART RATE: 84 BPM | HEIGHT: 62 IN | SYSTOLIC BLOOD PRESSURE: 114 MMHG | WEIGHT: 210.1 LBS

## 2024-04-15 DIAGNOSIS — Z90.49 ACQUIRED ABSENCE OF OTHER SPECIFIED PARTS OF DIGESTIVE TRACT: Chronic | ICD-10-CM

## 2024-04-15 DIAGNOSIS — Z98.891 HISTORY OF UTERINE SCAR FROM PREVIOUS SURGERY: Chronic | ICD-10-CM

## 2024-04-15 DIAGNOSIS — Z98.890 OTHER SPECIFIED POSTPROCEDURAL STATES: Chronic | ICD-10-CM

## 2024-04-15 LAB
BASOPHILS # BLD AUTO: 0.03 K/UL — SIGNIFICANT CHANGE UP (ref 0–0.2)
BASOPHILS NFR BLD AUTO: 0.4 % — SIGNIFICANT CHANGE UP (ref 0–2)
EOSINOPHIL # BLD AUTO: 0.26 K/UL — SIGNIFICANT CHANGE UP (ref 0–0.5)
EOSINOPHIL NFR BLD AUTO: 3.6 % — SIGNIFICANT CHANGE UP (ref 0–6)
HCT VFR BLD CALC: 38.2 % — SIGNIFICANT CHANGE UP (ref 34.5–45)
HGB BLD-MCNC: 11.8 G/DL — SIGNIFICANT CHANGE UP (ref 11.5–15.5)
IMM GRANULOCYTES NFR BLD AUTO: 0.1 % — SIGNIFICANT CHANGE UP (ref 0–0.9)
LYMPHOCYTES # BLD AUTO: 2.88 K/UL — SIGNIFICANT CHANGE UP (ref 1–3.3)
LYMPHOCYTES # BLD AUTO: 40.4 % — SIGNIFICANT CHANGE UP (ref 13–44)
MCHC RBC-ENTMCNC: 26.3 PG — LOW (ref 27–34)
MCHC RBC-ENTMCNC: 30.9 GM/DL — LOW (ref 32–36)
MCV RBC AUTO: 85.1 FL — SIGNIFICANT CHANGE UP (ref 80–100)
MONOCYTES # BLD AUTO: 0.34 K/UL — SIGNIFICANT CHANGE UP (ref 0–0.9)
MONOCYTES NFR BLD AUTO: 4.8 % — SIGNIFICANT CHANGE UP (ref 2–14)
NEUTROPHILS # BLD AUTO: 3.61 K/UL — SIGNIFICANT CHANGE UP (ref 1.8–7.4)
NEUTROPHILS NFR BLD AUTO: 50.7 % — SIGNIFICANT CHANGE UP (ref 43–77)
NRBC # BLD: 0 /100 WBCS — SIGNIFICANT CHANGE UP (ref 0–0)
PLATELET # BLD AUTO: 250 K/UL — SIGNIFICANT CHANGE UP (ref 150–400)
RBC # BLD: 4.49 M/UL — SIGNIFICANT CHANGE UP (ref 3.8–5.2)
RBC # FLD: 13.1 % — SIGNIFICANT CHANGE UP (ref 10.3–14.5)
SURGICAL PATHOLOGY STUDY: SIGNIFICANT CHANGE UP
WBC # BLD: 7.13 K/UL — SIGNIFICANT CHANGE UP (ref 3.8–10.5)
WBC # FLD AUTO: 7.13 K/UL — SIGNIFICANT CHANGE UP (ref 3.8–10.5)

## 2024-04-15 PROCEDURE — 99285 EMERGENCY DEPT VISIT HI MDM: CPT | Mod: 25

## 2024-04-15 PROCEDURE — 36000 PLACE NEEDLE IN VEIN: CPT

## 2024-04-15 RX ORDER — ACETAMINOPHEN 500 MG
975 TABLET ORAL ONCE
Refills: 0 | Status: COMPLETED | OUTPATIENT
Start: 2024-04-15 | End: 2024-04-15

## 2024-04-15 NOTE — ED ADULT NURSE NOTE - NSFALLUNIVINTERV_ED_ALL_ED
Bed/Stretcher in lowest position, wheels locked, appropriate side rails in place/Call bell, personal items and telephone in reach/Instruct patient to call for assistance before getting out of bed/chair/stretcher/Non-slip footwear applied when patient is off stretcher/[place] to call system/Physically safe environment - no spills, clutter or unnecessary equipment/Purposeful proactive rounding/Room/bathroom lighting operational, light cord in reach

## 2024-04-15 NOTE — ED ADULT TRIAGE NOTE - CHIEF COMPLAINT QUOTE
L thumb pain and discoloration s/p a-line placement (pt 3 weeks post-partum), also with sharp L chest and sharp L leg pain.

## 2024-04-15 NOTE — ED ADULT NURSE NOTE - OBJECTIVE STATEMENT
Cabrera Walsh called from Dr Audrey Casper office asking if the patient is able to stop his aspirin five days prior to having his renal cyro ablation  He is scheduled on 07/24/18  She can be reached at [phone]  Fax #[phone] fe 33y female w/ no pmh presents w/ left thumb pain s/p a-line for c section 3 weeks ago. Pt states she has been feeling left thumb pain, left leg pain and chest pain after having a line placed for her c section, endorsing numbness and tingling to her left thumb, left index and middle finger. Pt has bruising and swelling to left thumb. Pt denies shortness of breath or history of blood clots. Pt denies fever, chills, shortness of breath. Pt is ambulatory.

## 2024-04-15 NOTE — ED PROVIDER NOTE - OBJECTIVE STATEMENT
33F PMH  on 3/26/24 p/w left thumb pain and rash. Pt says that she had a left radial a-line in her wrist and since it was removed she has had intermittent left wrist pain. Last night, she started having left thumb pain and this AM she noticed some red spots. Also endorsing some pain in her left chest since last night, intermittent, does not radiate. No SOB, fever, N/V. Has mild abd pain since the surgery and intermittent vaginal spotting.

## 2024-04-15 NOTE — ED PROVIDER NOTE - RAPID ASSESSMENT
34 y/o female, s/p , presents to the ER for left arm pain. patient had an A-line done while admitted. states has been having pain to the area for the past three days. states this morning noticed her left thumb had purple spots on it and has been very painful. states also been experiencing CP and vaginal spotting. denies f/n/v/d, SOB, HA, dizziness, urinary symptoms, abdominal pain, fall.     Patient was rapidly assessed via a telemedicine and/or role of Quick Triage CHANTEL Anderson. A limited history, physical exam and assessment was performed. The patient will be seen and further evaluated in the main emergency department. The remainder of care and evaluation will be conducted by the primary emergency medicine team. Receiving team will follow up on labs, imaging and serially reassess patient as indicated. All further decisions regarding patient care, evaluation and disposition are at the discretion of the receiving primary emergency department team. 32 y/o female, s/p , presents to the ER for left arm pain. patient had an A-line done while admitted. states has been having pain to the area for the past three days. states this morning noticed her left thumb had purple spots on it and has been very painful. states also been experiencing CP and vaginal spotting. denies f/n/v/d, SOB, HA, dizziness, urinary symptoms, abdominal pain, fall. patient with good cap refill. hand/thumb is warm.     Patient was rapidly assessed via a telemedicine and/or role of Quick Triage CHANTEL Anderson. A limited history, physical exam and assessment was performed. The patient will be seen and further evaluated in the main emergency department. The remainder of care and evaluation will be conducted by the primary emergency medicine team. Receiving team will follow up on labs, imaging and serially reassess patient as indicated. All further decisions regarding patient care, evaluation and disposition are at the discretion of the receiving primary emergency department team. 34 y/o female, s/p , presents to the ER for left arm pain. patient had an A-line done while admitted. states has been having pain to the area for the past three days. states this morning noticed her left thumb had purple spots on it and has been very painful. states also been experiencing CP and vaginal spotting. denies f/n/v/d, SOB, HA, dizziness, urinary symptoms, abdominal pain, fall. patient with good cap refill. thumb is not cold to touch.     Patient was rapidly assessed via a telemedicine and/or role of Quick Triage CHANTEL Anderson. A limited history, physical exam and assessment was performed. The patient will be seen and further evaluated in the main emergency department. The remainder of care and evaluation will be conducted by the primary emergency medicine team. Receiving team will follow up on labs, imaging and serially reassess patient as indicated. All further decisions regarding patient care, evaluation and disposition are at the discretion of the receiving primary emergency department team.

## 2024-04-15 NOTE — ED PROVIDER NOTE - PHYSICAL EXAMINATION
PHYSICAL EXAM:  GENERAL: Sitting comfortable in bed, in no acute distress  HENMT: Atraumatic, moist mucous membranes  EYES: Clear bilaterally, PERRL, EOMs intact b/l  HEART: Regular rate and regular rhythm, S1/S2, no murmur  RESPIRATORY: Clear to auscultation bilaterally, no wheezes/rhonchi/rales  ABDOMEN: Soft, nontender, nondistended  EXTREMITIES: Left thumb ttp, left thumb warm with good cap refill, left radial pulse palpated  NEURO: Alert, follows commands, moving all extremities symmetrically    SKIN: Left thumb with multiple erythematous spots PHYSICAL EXAM:  GENERAL: Sitting comfortable in bed, in no acute distress  HENMT: Atraumatic, moist mucous membranes  EYES: Clear bilaterally, PERRL, EOMs intact b/l  HEART: Regular rate and regular rhythm, S1/S2, no murmur  RESPIRATORY: Clear to auscultation bilaterally, no wheezes/rhonchi/rales  ABDOMEN: Soft, nontender, nondistended  EXTREMITIES: Left thumb ttp, left thumb warm with good cap refill, left radial pulse palpated  NEURO: Alert, follows commands, moving all extremities symmetrically    SKIN: Left thumb with multiple erythematous macules

## 2024-04-15 NOTE — ED PROVIDER NOTE - PROGRESS NOTE DETAILS
Corina Samuel M.D. (Resident Physician): Left message with plastic surgeon Dr. Henderson's answering service and have not heard back.

## 2024-04-15 NOTE — ED PROVIDER NOTE - CLINICAL SUMMARY MEDICAL DECISION MAKING FREE TEXT BOX
I have reviewed discharge instructions with the patient and spouse. The patient and spouse verbalized understanding. Patient left ED via Discharge Method: ambulatory to Home with self and SO. Opportunity for questions and clarification provided. Patient given 0 scripts. To continue your aftercare when you leave the hospital, you may receive an automated call from our care team to check in on how you are doing. This is a free service and part of our promise to provide the best care and service to meet your aftercare needs.  If you have questions, or wish to unsubscribe from this service please call [phone]. Thank you for Choosing our [address] [place] [address] Emergency Department.         Benito Sanz RN  03/02/23 2163 33F PMH  on 3/26/24 p/w left thumb pain and rash. DDx includes but not limited to: thrombus, viral rash. Plan: blood work, US, pain meds. Will re-assess.

## 2024-04-15 NOTE — ED PROVIDER NOTE - ATTENDING CONTRIBUTION TO CARE
32 yo female with hx of c section 3 weeks ago complaining of L hand pain, intermittent that got worse today associated with erythema and tenderness over left thumb.  Also endorses intermittent chest and leg pain, currently does not have either of these two.    On exam, annular erythematous lesion on left thumb, tender to touch, no vesicles, radial pulse palpable, cap refill <2 seconds in all fingers on L hand, sensation and motor function intact in all other extremities.    differential includes possible thrombus in radial artery     will get labs vascular doppler study and dispo accordingly

## 2024-04-15 NOTE — ED PROVIDER NOTE - CHIEF COMPLAINT
[id number] [facility] ED  [address] [place] [address]  Phone: [phone]  Fax: [phone]    No name on file. August 25, 2021     Patient: Chiqui Marcano   YOB: 2009   Date of Visit: 8/25/2021       To Whom it May Concern:    Chiqui Marcano was seen in the Emergency Department on 8/25/2021. He may return to school on 8/26/21 or earlier if the parent deems appropriate. .    If you have any questions or concerns, please don't hesitate to call.     Sincerely,           Isabel Glass, DO The patient is a 33y Female complaining of

## 2024-04-16 DIAGNOSIS — I82.90 ACUTE EMBOLISM AND THROMBOSIS OF UNSPECIFIED VEIN: ICD-10-CM

## 2024-04-16 DIAGNOSIS — M79.645 PAIN IN LEFT FINGER(S): ICD-10-CM

## 2024-04-16 DIAGNOSIS — Z98.891 HISTORY OF UTERINE SCAR FROM PREVIOUS SURGERY: ICD-10-CM

## 2024-04-16 LAB
APTT BLD: 162 SEC — CRITICAL HIGH (ref 24.5–35.6)
APTT BLD: 30.8 SEC — SIGNIFICANT CHANGE UP (ref 24.5–35.6)
APTT BLD: 65.9 SEC — HIGH (ref 24.5–35.6)
APTT BLD: 75 SEC — HIGH (ref 24.5–35.6)
HCT VFR BLD CALC: 36.9 % — SIGNIFICANT CHANGE UP (ref 34.5–45)
HGB BLD-MCNC: 11.9 G/DL — SIGNIFICANT CHANGE UP (ref 11.5–15.5)
INR BLD: 0.95 RATIO — SIGNIFICANT CHANGE UP (ref 0.85–1.18)
MCHC RBC-ENTMCNC: 27 PG — SIGNIFICANT CHANGE UP (ref 27–34)
MCHC RBC-ENTMCNC: 32.2 GM/DL — SIGNIFICANT CHANGE UP (ref 32–36)
MCV RBC AUTO: 83.9 FL — SIGNIFICANT CHANGE UP (ref 80–100)
NRBC # BLD: 0 /100 WBCS — SIGNIFICANT CHANGE UP (ref 0–0)
NT-PROBNP SERPL-SCNC: 44 PG/ML — SIGNIFICANT CHANGE UP (ref 0–300)
PLATELET # BLD AUTO: 235 K/UL — SIGNIFICANT CHANGE UP (ref 150–400)
PROTHROM AB SERPL-ACNC: 10.5 SEC — SIGNIFICANT CHANGE UP (ref 9.5–13)
RBC # BLD: 4.4 M/UL — SIGNIFICANT CHANGE UP (ref 3.8–5.2)
RBC # FLD: 13 % — SIGNIFICANT CHANGE UP (ref 10.3–14.5)
TROPONIN T, HIGH SENSITIVITY RESULT: <6 NG/L — SIGNIFICANT CHANGE UP (ref 0–51)
WBC # BLD: 6.88 K/UL — SIGNIFICANT CHANGE UP (ref 3.8–10.5)
WBC # FLD AUTO: 6.88 K/UL — SIGNIFICANT CHANGE UP (ref 3.8–10.5)

## 2024-04-16 PROCEDURE — 93931 UPPER EXTREMITY STUDY: CPT | Mod: 26,RT

## 2024-04-16 PROCEDURE — 71046 X-RAY EXAM CHEST 2 VIEWS: CPT | Mod: 26

## 2024-04-16 PROCEDURE — 99223 1ST HOSP IP/OBS HIGH 75: CPT

## 2024-04-16 PROCEDURE — 93923 UPR/LXTR ART STDY 3+ LVLS: CPT | Mod: 26

## 2024-04-16 PROCEDURE — 73130 X-RAY EXAM OF HAND: CPT | Mod: 26,LT

## 2024-04-16 PROCEDURE — 73206 CT ANGIO UPR EXTRM W/O&W/DYE: CPT | Mod: 26,LT,MC

## 2024-04-16 RX ORDER — ONDANSETRON 8 MG/1
4 TABLET, FILM COATED ORAL EVERY 8 HOURS
Refills: 0 | Status: DISCONTINUED | OUTPATIENT
Start: 2024-04-16 | End: 2024-04-17

## 2024-04-16 RX ORDER — HEPARIN SODIUM 5000 [USP'U]/ML
4000 INJECTION INTRAVENOUS; SUBCUTANEOUS EVERY 6 HOURS
Refills: 0 | Status: DISCONTINUED | OUTPATIENT
Start: 2024-04-16 | End: 2024-04-17

## 2024-04-16 RX ORDER — ACETAMINOPHEN 500 MG
650 TABLET ORAL EVERY 6 HOURS
Refills: 0 | Status: DISCONTINUED | OUTPATIENT
Start: 2024-04-16 | End: 2024-04-17

## 2024-04-16 RX ORDER — HEPARIN SODIUM 5000 [USP'U]/ML
INJECTION INTRAVENOUS; SUBCUTANEOUS
Qty: 25000 | Refills: 0 | Status: DISCONTINUED | OUTPATIENT
Start: 2024-04-16 | End: 2024-04-17

## 2024-04-16 RX ORDER — HEPARIN SODIUM 5000 [USP'U]/ML
8000 INJECTION INTRAVENOUS; SUBCUTANEOUS EVERY 6 HOURS
Refills: 0 | Status: DISCONTINUED | OUTPATIENT
Start: 2024-04-16 | End: 2024-04-17

## 2024-04-16 RX ORDER — HEPARIN SODIUM 5000 [USP'U]/ML
8000 INJECTION INTRAVENOUS; SUBCUTANEOUS ONCE
Refills: 0 | Status: COMPLETED | OUTPATIENT
Start: 2024-04-16 | End: 2024-04-16

## 2024-04-16 RX ORDER — LANOLIN ALCOHOL/MO/W.PET/CERES
3 CREAM (GRAM) TOPICAL AT BEDTIME
Refills: 0 | Status: DISCONTINUED | OUTPATIENT
Start: 2024-04-16 | End: 2024-04-17

## 2024-04-16 RX ORDER — CEPHALEXIN 500 MG
500 CAPSULE ORAL
Refills: 0 | Status: DISCONTINUED | OUTPATIENT
Start: 2024-04-16 | End: 2024-04-17

## 2024-04-16 RX ADMIN — HEPARIN SODIUM 1500 UNIT(S)/HR: 5000 INJECTION INTRAVENOUS; SUBCUTANEOUS at 19:13

## 2024-04-16 RX ADMIN — HEPARIN SODIUM 1500 UNIT(S)/HR: 5000 INJECTION INTRAVENOUS; SUBCUTANEOUS at 17:18

## 2024-04-16 RX ADMIN — HEPARIN SODIUM 1500 UNIT(S)/HR: 5000 INJECTION INTRAVENOUS; SUBCUTANEOUS at 11:25

## 2024-04-16 RX ADMIN — Medication 500 MILLIGRAM(S): at 16:38

## 2024-04-16 RX ADMIN — HEPARIN SODIUM 1800 UNIT(S)/HR: 5000 INJECTION INTRAVENOUS; SUBCUTANEOUS at 02:56

## 2024-04-16 RX ADMIN — HEPARIN SODIUM 8000 UNIT(S): 5000 INJECTION INTRAVENOUS; SUBCUTANEOUS at 02:55

## 2024-04-16 RX ADMIN — Medication 650 MILLIGRAM(S): at 16:38

## 2024-04-16 RX ADMIN — HEPARIN SODIUM 1800 UNIT(S)/HR: 5000 INJECTION INTRAVENOUS; SUBCUTANEOUS at 07:06

## 2024-04-16 RX ADMIN — Medication 500 MILLIGRAM(S): at 23:43

## 2024-04-16 RX ADMIN — HEPARIN SODIUM 0 UNIT(S)/HR: 5000 INJECTION INTRAVENOUS; SUBCUTANEOUS at 10:22

## 2024-04-16 NOTE — CONSULT NOTE ADULT - SUBJECTIVE AND OBJECTIVE BOX
Consult requested by Dr Shabazz and vascular team.  Left thumb with scattered petechiae and tenderness of distal phalynx, but no evidence of infx or darshan ischemia.  Likely due to the thrombosis proximally.  Rec: Treatment as per cascular surgery/Trial of prophylactic abx, eg Keflex/Follow up with me as outpt.  No acute plastic/hand surgical intervention at this time unless condition worsens. Yes

## 2024-04-16 NOTE — CONSULT NOTE ADULT - ASSESSMENT
33F s/p  (had an arterial line in the left radial artery) on 3/26 now presenting with left hand pain. Hematology consulted for hypercoagulable workup.    # Left Radial Artery Thrombosis, Line-Associated  - Patient recently admitted for  and had an arterial line in the left radial artery, which is the most likely cause of the non-occlusive thrombosis in the left radial artery  - Currently breastfeeding and planning to continue, so will need Lovenox 1 mg/kg subQ qd for 3 months  - f/u APLS labs. No utility of checking ATIII, protein C, and protein S given acute thrombosis. No need to check for Factor V Leiden or prothrombin gene mutation either  - Will arrange for follow up at the [facility] after discharge    Note is not finalized until signed by attending.     Mukesh San MD  Hematology/Oncology Fellow PGY-5  Pager: [facility] [phone] / [phone]  After 5pm and on weekends please page on-call fellow

## 2024-04-16 NOTE — H&P ADULT - PROBLEM SELECTOR PROBLEM 1
Thrombus

## 2024-04-16 NOTE — ED ADULT NURSE REASSESSMENT NOTE - NS ED NURSE REASSESS COMMENT FT1
received report from CONNER Murillo @ 0700. pt appears comfortable resting in stretcher with appropriate side rails up for safety and call bell in reach. pt is A&Ox4, breathing even and unlabored, VSS, NAD noted at this time. Heparin infusing via IV pump at 18mL/hr per orders, confirmed to be infusing at correct rate with CONNER Murillo at the bedside. Patient admitted to Medicine. Admission band applied to patient utilizing two patient identifiers. Pending bed assignment. plan of care discussed.

## 2024-04-16 NOTE — H&P ADULT - PROBLEM SELECTOR PLAN 1
likely in setting of left radial aterial line, clot nonocclusive but possible it was and it broke off causing capilarry damage  vascular requesting hand surgery cs- placed; left hand xray no fractures  hematology c/s :lovenox v coumadin on dc  c/w hep gtt for now  check va duplex aterial of rue  2d echo- low suspicion for endocarditis   VA duplex left ue with ppg likely in setting of left radial ate rial line, clot nonocclusive but possible it was and it broke off causing capillary damage  vascular requesting hand surgery cs- placed with Dr Macias ; left hand xray no fractures; no signs or symptoms of comparement syndrome, pulses intact no vascular compromise  also d/w hematology follow- APLS labs sent- to see; avoid DOAC on dc since patient wants to continue to breastfeed   hematology c/s :lovenox v coumadin on dc  c/w hep gtt for now. monitor PTT q6hr  goal 50--70  check va duplex aterial of rue  2d echo- low suspicion for endocarditis   VA duplex left ue with ppg

## 2024-04-16 NOTE — PROVIDER CONTACT NOTE (OTHER) - ASSESSMENT
Pt A&ox4. Pt denies weakness and headache at this time. Pt states "my BP was like that when I was at the other hospital".

## 2024-04-16 NOTE — H&P ADULT - NSHPLABSRESULTS_GEN_ALL_CORE
11.9   6.88  )-----------( 235      ( 16 Apr 2024 09:16 )             36.9     04-15    142  |  102  |  10  ----------------------------<  96  4.1   |  26  |  0.65    Ca    9.4      15 Apr 2024 23:47    TPro  7.4  /  Alb  4.3  /  TBili  0.1<L>  /  DBili  x   /  AST  17  /  ALT  30  /  AlkPhos  79  04-15    CTA LUE Previously noted distal right radial artery nonocclusive thrombus is not resolved on the current study although there is mild inflammation surrounding the distal right radial artery just proximal to the wrist. Consider follow-up Doppler sonography as indicated.    IMPRESSION:  VA duplex LUE  Nonocclusive thrombus within the distal radial artery. No pseudoaneurysm.

## 2024-04-16 NOTE — CONSULT NOTE ADULT - ASSESSMENT
33F s/p  on 3/26 with left radial a-line placement pw left hand pain. LUE ultrasound obtained in ED sf partially occlusive thrombus of the left radial artery.     Recommendations:  -AC if no contraindication to therapeutic AC.  -Medicine admission.  -Stat VA LUE duplex with digital PPG.  -Hand surgery consult.  -Left hand xray.    Discussed with vascular surgery fellow on behalf of Dr. Lee.    Vascular Surgery  Please page [phone] for all questions.   33F s/p  on 3/26 with left radial a-line placement pw left hand pain. LUE ultrasound obtained in ED sf nonocclusive thrombus of the left radial artery.     Recommendations:  -AC if no contraindication to therapeutic AC.  -Medicine admission.  -Stat VA LUE duplex with digital PPG.  -Hand surgery consult.  -Left hand xray.    Discussed with vascular surgery fellow on behalf of Dr. Lee.    Vascular Surgery  Please page [phone] for all questions.   33F s/p  on 3/26 with left radial a-line placement pw left hand pain. LUE ultrasound obtained in ED sf nonocclusive thrombus of the left radial artery.     Recommendations:  -In setting of palpable left radial and ulnar pulses, dopplerable palmar arch signals, absence of gangrenous changes to thumb, and normal waveform on pulse oximetry, low concern for ischemic etiology of patient's symptoms at this time.  -AC if no contraindication to therapeutic AC.  -Medicine admission.  -Stat VA LUE duplex with digital PPG.  -Hand surgery consult to evaluate for alternative etiologies causing patient's symptoms including infection, trauma, etc.  -Left hand x-ray.    Discussed with vascular surgery fellow on behalf of Dr. Lee.    Vascular Surgery  Please page [phone] for all questions.

## 2024-04-16 NOTE — H&P ADULT - HISTORY OF PRESENT ILLNESS
33F s/p  on 3/26 pw left hand pain. Patient reports she had had a left radial a-line placed for her  on 3/26 for increased monitoring due to concerns for placenta acrreta. At that time, she experienced left wrist pain which has since been intermittent in nature. Last night, she began to experience intense left thumb pain and noticed purple spots on her thumb so decided to come to ER. She denies any weakness, but has some numbness and tingling in the left thumb however now improving. denies trauma to the left hand. got her nails done last week and when she noticed the swelling and pain of her wrist she took her tip off. patient denies any fevers, chills, night sweats, rashes, joint pain.

## 2024-04-16 NOTE — H&P ADULT - NSHPPHYSICALEXAM_GEN_ALL_CORE
Vital Signs Last 24 Hrs  T(C): 36.4 (16 Apr 2024 09:19), Max: 37.3 (16 Apr 2024 02:45)  T(F): 97.5 (16 Apr 2024 09:19), Max: 99.1 (16 Apr 2024 02:45)  HR: 74 (16 Apr 2024 09:19) (63 - 84)  BP: 117/83 (16 Apr 2024 09:19) (100/73 - 117/83)  BP(mean): 80 (16 Apr 2024 02:45) (80 - 80)  RR: 18 (16 Apr 2024 09:19) (18 - 20)  SpO2: 98% (16 Apr 2024 09:19) (98% - 100%)    Parameters below as of 16 Apr 2024 09:19  Patient On (Oxygen Delivery Method): room air        CONSTITUTIONAL: NAD, well-developed, well-groomed  EYES: PERRL; conjunctiva and sclera clear  ENMT: Moist oral mucosa, no pharyngeal injection or exudates; normal dentition  NECK: Supple, no palpable masses; no thyromegaly  RESPIRATORY: Normal respiratory effort; lungs are clear to auscultation bilaterally  CARDIOVASCULAR: Regular rate and rhythm, normal S1 and S2, no murmur/rub/gallop; No lower extremity edema; Peripheral pulses are 2+ bilaterally  ABDOMEN:  vertical c section below umbilicus scar healing well  MUSCULOSKELETAL:  Left thumb TTP with scattered purpura, + ; normal cap refill, 2+ left radial artery  PSYCH: A+O to person, place, and time; affect appropriate  NEUROLOGY: CN 2-12 are intact and symmetric; no gross sensory deficits   SKIN: No rashes; no palpable lesions

## 2024-04-16 NOTE — PROVIDER CONTACT NOTE (OTHER) - NAME OF MD/NP/PA/DO NOTIFIED:
What Type Of Note Output Would You Prefer (Optional)?: Bullet Format
How Severe Is Your Rash?: mild
Is This A New Presentation, Or A Follow-Up?: Rash
CHANTEL Rhodes

## 2024-04-16 NOTE — H&P ADULT - ASSESSMENT
33F s/p  on 3/26 with left radial a-line placement pw left hand pain. LUE ultrasound obtained in ED sf nonocclusive thrombus of the left radial artery.

## 2024-04-16 NOTE — PATIENT PROFILE ADULT - FALL HARM RISK - HARM RISK INTERVENTIONS

## 2024-04-16 NOTE — H&P ADULT - NSICDXPASTMEDICALHX_GEN_ALL_CORE_FT
PAST MEDICAL HISTORY:  Cholelithiases     GERD (gastroesophageal reflux disease)     Herniated disc, cervical pt has range of motion without difficulties    Lumbar back pain chronic, on and off    Obesity

## 2024-04-16 NOTE — CONSULT NOTE ADULT - SUBJECTIVE AND OBJECTIVE BOX
Hematology Consult Note    HPI as per admitting team:   33F s/p  on 3/26 pw left hand pain. Patient reports she had had a left radial a-line placed for her  on 3/26 for increased monitoring due to concerns for placenta acrreta. At that time, she experienced left wrist pain which has since been intermittent in nature. Last night, she began to experience intense left thumb pain and noticed purple spots on her thumb so decided to come to ER. She denies any weakness, but has some numbness and tingling in the left thumb however now improving. denies trauma to the left hand. got her nails done last week and when she noticed the swelling and pain of her wrist she took her tip off. patient denies any fevers, chills, night sweats, rashes, joint pain.  (2024 11:30)    REVIEW OF SYSTEMS:  CONSTITUTIONAL: No weakness, fevers or chills  EYES/ENT: No visual changes;  No vertigo or throat pain   NECK: No pain or stiffness  RESPIRATORY: No cough, wheezing, hemoptysis; No shortness of breath  CARDIOVASCULAR: No chest pain or palpitations  GASTROINTESTINAL: No abdominal or epigastric pain. No nausea, vomiting, or hematemesis; No diarrhea or constipation. No melena or hematochezia.  GENITOURINARY: No dysuria, frequency or hematuria  NEUROLOGICAL: No numbness or weakness  SKIN: No itching, burning, rashes, or lesions   All other review of systems is negative unless indicated above.    PAST MEDICAL & SURGICAL HISTORY:  Herniated disc, cervical  pt has range of motion without difficulties    Lumbar back pain  chronic, on and off    Cholelithiases    GERD (gastroesophageal reflux disease)    Obesity    H/O:    &     History of D&C  2019    S/P cholecystectomy      FAMILY HISTORY:  No family history of clotting disorders.    SOCIAL HISTORY:     Allergies  No Known Allergies      MEDICATIONS  (STANDING):  heparin  Infusion.  Unit(s)/Hr (18 mL/Hr) IV Continuous <Continuous>    MEDICATIONS  (PRN):  acetaminophen     Tablet .. 650 milliGRAM(s) Oral every 6 hours PRN Temp greater or equal to 38C (100.4F), Mild Pain (1 - 3)  aluminum hydroxide/magnesium hydroxide/simethicone Suspension 30 milliLiter(s) Oral every 4 hours PRN Dyspepsia  heparin   Injectable 8000 Unit(s) IV Push every 6 hours PRN For aPTT less than 40  heparin   Injectable 4000 Unit(s) IV Push every 6 hours PRN For aPTT between 40 - 57  melatonin 3 milliGRAM(s) Oral at bedtime PRN Insomnia  ondansetron Injectable 4 milliGRAM(s) IV Push every 8 hours PRN Nausea and/or Vomiting      OBJECTIVE   Height (cm): 157.5 (04-15 @ 18:57)  Weight (kg): 97 ( @ 00:59)  BMI (kg/m2): 39.1 ( @ 00:59)  BSA (m2): 1.97 ( @ 00:59)    T(F): 97.5 (24 @ 09:19), Max: 99.1 (24 @ 02:45)  HR: 74 (24 @ 09:19)  BP: 117/83 (24 @ 09:19)  RR: 18 (24 @ 09:19)  SpO2: 98% (24 @ 09:19)  Wt(kg): --    PHYSICAL EXAM   GENERAL: NAD, well-developed  HEAD:  Atraumatic, Normocephalic  EYES: EOMI, PERRLA, conjunctiva and sclera clear  NECK: Supple, No JVD  CHEST/LUNG: Clear to auscultation bilaterally; No wheeze  HEART: Regular rate and rhythm; No murmurs, rubs, or gallops  ABDOMEN: Soft, Nontender, Nondistended; Bowel sounds present  EXTREMITIES:  2+ Peripheral Pulses, No clubbing, cyanosis, or edema  NEUROLOGY: non-focal  SKIN: No rashes or lesions                          11.9   6.88  )-----------( 235      ( 2024 09:16 )             36.9       04-15    142  |  102  |  10  ----------------------------<  96  4.1   |  26  |  0.65    Ca    9.4      15 Apr 2024 23:47    TPro  7.4  /  Alb  4.3  /  TBili  0.1<L>  /  DBili  x   /  AST  17  /  ALT  30  /  AlkPhos  79  04-15      RADIOLOGY & ADDITIONAL TESTING:   < from: VA Duplex Upper Extrem Arterial Limited, Left (04.15.24 @ 21:55) >  FINDINGS:      Brachial: Limited. 101 cm/s mid, 89 cm/s distal. No occlusion or   pseudoaneurysm.    Radial artery: 73 cm/s proximal,  44 cm/s mid, 38 cm/s distal.   Nonocclusive thrombus noted within the distal radial artery. No   pseudoaneurysm.    Ulna artery: Limited. 59 cm/s mid, 63 cm/s distal. No occlusion or   pseudoaneurysm.    IMPRESSION:    Nonocclusive thrombus within the distal radial artery. No pseudoaneurysm.    --- End of Report ---     HARDY ONEIL MD; Resident Radiology  This document has been electronically signed.  KYM DAVIS MD; Attending Radiologist  This document has been electronically signed. Apr 15 2024 10:58PM    < end of copied text >

## 2024-04-16 NOTE — CONSULT NOTE ADULT - ATTENDING COMMENTS
33-yr-old woman s/p  developed non-occlusive thrombosis seen in consult for thrombophilia w/u. The patient does not have any personal or family h/o clot. She has had a A-line for monitoring during  procedure. This is clearly a provoked episode does not need any thrombophilia w/u. However, agree with APLA testing. Also agree with therapeutic dose LMWH for 3 months as the AC agent. No hematology f/u needed if APLA is negative.
as above, there does not seem to be evidence of malperfusion/distal ischemia, fu vasc lab studies

## 2024-04-16 NOTE — PROVIDER CONTACT NOTE (OTHER) - BACKGROUND
Pt admitted for non-occlusive thrombi at L radial arterial line. Pt on heparin gtt at 15, therapeutic times 1. Recent .

## 2024-04-16 NOTE — CONSULT NOTE ADULT - SUBJECTIVE AND OBJECTIVE BOX
Vascular Surgery Consult  Consulting attending: Dr. Lee    HPI:  33F s/p  on 3/26 pw left hand pain. Patient reports she had had a left radial a-line placed for her  on 3/26. At that time, she experienced left wrist pain which has since been intermittent in nature. She reports that on 4/3 she began experiencing left hand weakness and began intermittently dropping objects. Last night, she began to experience intense left thumb pain and noticed purple spots on her thumb, for which she now presents.     In ED, chris JIM ultrasound obtained in ED sf partially occlusive thrombus of the left radial artery. Patient reports chronic intermittent tingling in the left fingers 2/2 a chronically herniated cervical disc, denies any new numbness or sensory changed in the left hand. Denies trauma to the left hand.    PAST MEDICAL HISTORY:  Herniated disc, cervical    Lumbar back pain    Cholelithiases    Morbid obesity    GERD (gastroesophageal reflux disease)    Obesity        PAST SURGICAL HISTORY:  No significant past surgical history    H/O:     History of D&C    S/P cholecystectomy        MEDICATIONS:      ALLERGIES:  No Known Allergies      VITALS & I/Os:  Vital Signs Last 24 Hrs  T(C): 36.7 (15 Apr 2024 23:44), Max: 36.7 (15 Apr 2024 18:57)  T(F): 98.1 (15 Apr 2024 23:44), Max: 98.1 (15 Apr 2024 23:44)  HR: 67 (15 Apr 2024 23:44) (67 - 84)  BP: 108/62 (15 Apr 2024 23:44) (108/62 - 114/81)  BP(mean): --  RR: 18 (15 Apr 2024 23:44) (18 - 20)  SpO2: 100% (15 Apr 2024 23:44) (98% - 100%)    Parameters below as of 15 Apr 2024 23:44  Patient On (Oxygen Delivery Method): room air        I&O's Summary      PHYSICAL EXAM:  General: Not acutely distressed  Respiratory: Nonlabored respirations  Cardiovascular: Pulse present  Extremities: Warm. Left thumb tenderness with few scattered purpura. No sensory deficits. Mild weakening of left hand. Left thumb pulse oximeter waveform wnl  Vascular: palpable radial and ulnar pulses bilaterally. Left palmar arch dopplerable    LABS:                        11.8   7.13  )-----------( 250      ( 15 Apr 2024 23:47 )             38.2     04-15    142  |  102  |  10  ----------------------------<  96  4.1   |  26  |  0.65    Ca    9.4      15 Apr 2024 23:47    TPro  7.4  /  Alb  4.3  /  TBili  0.1<L>  /  DBili  x   /  AST  17  /  ALT  30  /  AlkPhos  79  04-15    Lactate:              Urinalysis Basic - ( 15 Apr 2024 23:47 )    Color: x / Appearance: x / SG: x / pH: x  Gluc: 96 mg/dL / Ketone: x  / Bili: x / Urobili: x   Blood: x / Protein: x / Nitrite: x   Leuk Esterase: x / RBC: x / WBC x   Sq Epi: x / Non Sq Epi: x / Bacteria: x        IMAGING:  < from: VA Duplex Upper Extrem Arterial Limited, Left (04.15.24 @ 21:55) >  IMPRESSION:    Nonocclusive thrombus within the distal radial artery. No pseudoaneurysm.    --- End of Report ---      < end of copied text >                                                                                               Vascular Surgery Consult  Consulting attending: Dr. Lee    HPI:  33F s/p  on 3/26 pw left hand pain. Patient reports she had had a left radial a-line placed for her  on 3/26. At that time, she experienced left wrist pain which has since been intermittent in nature. She reports that on 4/3 she began experiencing left hand weakness and began intermittently dropping objects. Last night, she began to experience intense left thumb pain and noticed purple spots on her thumb, for which she now presents.     In ED, chris JIM ultrasound obtained in ED sf nonocclusive thrombus of the left radial artery. Patient reports chronic intermittent tingling in the left fingers 2/2 a chronically herniated cervical disc, denies any new numbness or sensory changed in the left hand. Denies trauma to the left hand.    PAST MEDICAL HISTORY:  Herniated disc, cervical    Lumbar back pain    Cholelithiases    Morbid obesity    GERD (gastroesophageal reflux disease)    Obesity        PAST SURGICAL HISTORY:  No significant past surgical history    H/O:     History of D&C    S/P cholecystectomy        MEDICATIONS:      ALLERGIES:  No Known Allergies      VITALS & I/Os:  Vital Signs Last 24 Hrs  T(C): 36.7 (15 Apr 2024 23:44), Max: 36.7 (15 Apr 2024 18:57)  T(F): 98.1 (15 Apr 2024 23:44), Max: 98.1 (15 Apr 2024 23:44)  HR: 67 (15 Apr 2024 23:44) (67 - 84)  BP: 108/62 (15 Apr 2024 23:44) (108/62 - 114/81)  BP(mean): --  RR: 18 (15 Apr 2024 23:44) (18 - 20)  SpO2: 100% (15 Apr 2024 23:44) (98% - 100%)    Parameters below as of 15 Apr 2024 23:44  Patient On (Oxygen Delivery Method): room air        I&O's Summary      PHYSICAL EXAM:  General: Not acutely distressed  Respiratory: Nonlabored respirations  Cardiovascular: Pulse present  Extremities: Warm. Left thumb tenderness with few scattered purpura. No sensory deficits. Mild weakening of left hand. Left thumb pulse oximeter waveform wnl. No wounds or gangrene noted  Vascular: palpable radial and ulnar pulses bilaterally. Left palmar arch dopplerable    LABS:                        11.8   7.13  )-----------( 250      ( 15 Apr 2024 23:47 )             38.2     04-15    142  |  102  |  10  ----------------------------<  96  4.1   |  26  |  0.65    Ca    9.4      15 Apr 2024 23:47    TPro  7.4  /  Alb  4.3  /  TBili  0.1<L>  /  DBili  x   /  AST  17  /  ALT  30  /  AlkPhos  79  04-15    Lactate:              Urinalysis Basic - ( 15 Apr 2024 23:47 )    Color: x / Appearance: x / SG: x / pH: x  Gluc: 96 mg/dL / Ketone: x  / Bili: x / Urobili: x   Blood: x / Protein: x / Nitrite: x   Leuk Esterase: x / RBC: x / WBC x   Sq Epi: x / Non Sq Epi: x / Bacteria: x        IMAGING:  < from: VA Duplex Upper Extrem Arterial Limited, Left (04.15.24 @ 21:55) >  IMPRESSION:    Nonocclusive thrombus within the distal radial artery. No pseudoaneurysm.    --- End of Report ---      < end of copied text >                                                                                               Vascular Surgery Consult  Consulting attending: Dr. Lee    HPI:  33F s/p  on 3/26 pw left hand pain. Patient reports she had had a left radial a-line placed for her  on 3/26. At that time, she experienced left wrist pain which has since been intermittent in nature. She reports that on 4/3 she began experiencing left hand weakness and began intermittently dropping objects. Last night, she began to experience intense left thumb pain and noticed purple spots on her thumb, for which she now presents.     In ED, chris JIM ultrasound obtained in ED sf nonocclusive thrombus of the left radial artery. Patient reports chronic intermittent tingling in the left fingers 2/2 a chronically herniated cervical disc, denies any new numbness or sensory changed in the left hand. Denies trauma to the left hand.    PAST MEDICAL HISTORY:  Herniated disc, cervical    Lumbar back pain    Cholelithiases    Morbid obesity    GERD (gastroesophageal reflux disease)    Obesity        PAST SURGICAL HISTORY:  No significant past surgical history    H/O:     History of D&C    S/P cholecystectomy        MEDICATIONS:      ALLERGIES:  No Known Allergies      VITALS & I/Os:  Vital Signs Last 24 Hrs  T(C): 36.7 (15 Apr 2024 23:44), Max: 36.7 (15 Apr 2024 18:57)  T(F): 98.1 (15 Apr 2024 23:44), Max: 98.1 (15 Apr 2024 23:44)  HR: 67 (15 Apr 2024 23:44) (67 - 84)  BP: 108/62 (15 Apr 2024 23:44) (108/62 - 114/81)  BP(mean): --  RR: 18 (15 Apr 2024 23:44) (18 - 20)  SpO2: 100% (15 Apr 2024 23:44) (98% - 100%)    Parameters below as of 15 Apr 2024 23:44  Patient On (Oxygen Delivery Method): room air        I&O's Summary      PHYSICAL EXAM:  General: Not acutely distressed  Respiratory: Nonlabored respirations  Cardiovascular: Pulse present  Extremities: Warm. Left thumb tenderness with few scattered purpura. No sensory deficits. Mild weakening of left hand. Left thumb pulse oximeter waveform wnl. No wounds or gangrene noted. Normal capillary refill  Vascular: palpable radial and ulnar pulses bilaterally. Left palmar arch dopplerable    LABS:                        11.8   7.13  )-----------( 250      ( 15 Apr 2024 23:47 )             38.2     04-15    142  |  102  |  10  ----------------------------<  96  4.1   |  26  |  0.65    Ca    9.4      15 Apr 2024 23:47    TPro  7.4  /  Alb  4.3  /  TBili  0.1<L>  /  DBili  x   /  AST  17  /  ALT  30  /  AlkPhos  79  04-15    Lactate:              Urinalysis Basic - ( 15 Apr 2024 23:47 )    Color: x / Appearance: x / SG: x / pH: x  Gluc: 96 mg/dL / Ketone: x  / Bili: x / Urobili: x   Blood: x / Protein: x / Nitrite: x   Leuk Esterase: x / RBC: x / WBC x   Sq Epi: x / Non Sq Epi: x / Bacteria: x        IMAGING:  < from: VA Duplex Upper Extrem Arterial Limited, Left (04.15.24 @ 21:55) >  IMPRESSION:    Nonocclusive thrombus within the distal radial artery. No pseudoaneurysm.    --- End of Report ---      < end of copied text >

## 2024-04-17 ENCOUNTER — TRANSCRIPTION ENCOUNTER (OUTPATIENT)
Age: 34
End: 2024-04-17

## 2024-04-17 ENCOUNTER — RESULT REVIEW (OUTPATIENT)
Age: 34
End: 2024-04-17

## 2024-04-17 VITALS
RESPIRATION RATE: 17 BRPM | SYSTOLIC BLOOD PRESSURE: 100 MMHG | OXYGEN SATURATION: 97 % | DIASTOLIC BLOOD PRESSURE: 63 MMHG | TEMPERATURE: 99 F | HEART RATE: 61 BPM

## 2024-04-17 LAB
APTT 50/50 2HOUR INCUB: 36.4 SEC — SIGNIFICANT CHANGE UP (ref 24.5–36.6)
APTT BLD: 35.9 SEC — SIGNIFICANT CHANGE UP (ref 24.5–36.6)
APTT BLD: 44.3 SEC — HIGH (ref 24.5–35.6)
APTT BLD: 71.1 SEC — HIGH (ref 24.5–35.6)
B2 GLYCOPROT1 AB SER QL: NEGATIVE — SIGNIFICANT CHANGE UP
CARDIOLIPIN AB SER-ACNC: NEGATIVE — SIGNIFICANT CHANGE UP
DRVVT RATIO: 1.3 RATIO — HIGH (ref 0–1.21)
DRVVT SCREEN TO CONFIRM RATIO: ABNORMAL
HCT VFR BLD CALC: 39.6 % — SIGNIFICANT CHANGE UP (ref 34.5–45)
HGB BLD-MCNC: 12.4 G/DL — SIGNIFICANT CHANGE UP (ref 11.5–15.5)
MCHC RBC-ENTMCNC: 26.8 PG — LOW (ref 27–34)
MCHC RBC-ENTMCNC: 31.3 GM/DL — LOW (ref 32–36)
MCV RBC AUTO: 85.5 FL — SIGNIFICANT CHANGE UP (ref 80–100)
NORMALIZED SCT PPP-RTO: 1.15 RATIO — SIGNIFICANT CHANGE UP (ref 0–1.16)
NORMALIZED SCT PPP-RTO: SIGNIFICANT CHANGE UP
NRBC # BLD: 0 /100 WBCS — SIGNIFICANT CHANGE UP (ref 0–0)
PAT CTL 2H: 36.3 SEC — SIGNIFICANT CHANGE UP (ref 24.5–36.6)
PLATELET # BLD AUTO: 233 K/UL — SIGNIFICANT CHANGE UP (ref 150–400)
RBC # BLD: 4.63 M/UL — SIGNIFICANT CHANGE UP (ref 3.8–5.2)
RBC # FLD: 13.1 % — SIGNIFICANT CHANGE UP (ref 10.3–14.5)
WBC # BLD: 5.86 K/UL — SIGNIFICANT CHANGE UP (ref 3.8–10.5)
WBC # FLD AUTO: 5.86 K/UL — SIGNIFICANT CHANGE UP (ref 3.8–10.5)

## 2024-04-17 PROCEDURE — 84484 ASSAY OF TROPONIN QUANT: CPT

## 2024-04-17 PROCEDURE — 73130 X-RAY EXAM OF HAND: CPT

## 2024-04-17 PROCEDURE — 85025 COMPLETE CBC W/AUTO DIFF WBC: CPT

## 2024-04-17 PROCEDURE — 85027 COMPLETE CBC AUTOMATED: CPT

## 2024-04-17 PROCEDURE — 93306 TTE W/DOPPLER COMPLETE: CPT

## 2024-04-17 PROCEDURE — 99285 EMERGENCY DEPT VISIT HI MDM: CPT

## 2024-04-17 PROCEDURE — 80053 COMPREHEN METABOLIC PANEL: CPT

## 2024-04-17 PROCEDURE — 85730 THROMBOPLASTIN TIME PARTIAL: CPT

## 2024-04-17 PROCEDURE — 36410 VNPNXR 3YR/> PHY/QHP DX/THER: CPT

## 2024-04-17 PROCEDURE — 73206 CT ANGIO UPR EXTRM W/O&W/DYE: CPT | Mod: MC

## 2024-04-17 PROCEDURE — 71046 X-RAY EXAM CHEST 2 VIEWS: CPT

## 2024-04-17 PROCEDURE — 36415 COLL VENOUS BLD VENIPUNCTURE: CPT

## 2024-04-17 PROCEDURE — 86147 CARDIOLIPIN ANTIBODY EA IG: CPT

## 2024-04-17 PROCEDURE — 86146 BETA-2 GLYCOPROTEIN ANTIBODY: CPT

## 2024-04-17 PROCEDURE — 93923 UPR/LXTR ART STDY 3+ LVLS: CPT

## 2024-04-17 PROCEDURE — 99239 HOSP IP/OBS DSCHRG MGMT >30: CPT

## 2024-04-17 PROCEDURE — 85613 RUSSELL VIPER VENOM DILUTED: CPT

## 2024-04-17 PROCEDURE — 96374 THER/PROPH/DIAG INJ IV PUSH: CPT

## 2024-04-17 PROCEDURE — 93306 TTE W/DOPPLER COMPLETE: CPT | Mod: 26

## 2024-04-17 PROCEDURE — 85610 PROTHROMBIN TIME: CPT

## 2024-04-17 PROCEDURE — 83880 ASSAY OF NATRIURETIC PEPTIDE: CPT

## 2024-04-17 PROCEDURE — 93931 UPPER EXTREMITY STUDY: CPT

## 2024-04-17 PROCEDURE — 85732 THROMBOPLASTIN TIME PARTIAL: CPT

## 2024-04-17 RX ORDER — CEPHALEXIN 500 MG
1 CAPSULE ORAL
Qty: 8 | Refills: 0
Start: 2024-04-17 | End: 2024-04-19

## 2024-04-17 RX ORDER — ENOXAPARIN SODIUM 100 MG/ML
100 INJECTION SUBCUTANEOUS EVERY 12 HOURS
Refills: 0 | Status: DISCONTINUED | OUTPATIENT
Start: 2024-04-17 | End: 2024-04-17

## 2024-04-17 RX ORDER — ENOXAPARIN SODIUM 100 MG/ML
100 INJECTION SUBCUTANEOUS
Qty: 1 | Refills: 0
Start: 2024-04-17 | End: 2024-05-16

## 2024-04-17 RX ADMIN — Medication 500 MILLIGRAM(S): at 11:00

## 2024-04-17 RX ADMIN — HEPARIN SODIUM 1500 UNIT(S)/HR: 5000 INJECTION INTRAVENOUS; SUBCUTANEOUS at 05:08

## 2024-04-17 RX ADMIN — Medication 500 MILLIGRAM(S): at 17:04

## 2024-04-17 RX ADMIN — Medication 650 MILLIGRAM(S): at 14:46

## 2024-04-17 RX ADMIN — HEPARIN SODIUM 1500 UNIT(S)/HR: 5000 INJECTION INTRAVENOUS; SUBCUTANEOUS at 07:57

## 2024-04-17 RX ADMIN — Medication 500 MILLIGRAM(S): at 05:09

## 2024-04-17 RX ADMIN — ENOXAPARIN SODIUM 100 MILLIGRAM(S): 100 INJECTION SUBCUTANEOUS at 17:04

## 2024-04-17 RX ADMIN — ENOXAPARIN SODIUM 100 MILLIGRAM(S): 100 INJECTION SUBCUTANEOUS at 11:00

## 2024-04-17 RX ADMIN — Medication 650 MILLIGRAM(S): at 15:17

## 2024-04-17 NOTE — DISCHARGE NOTE PROVIDER - NSDCFUSCHEDAPPT_GEN_ALL_CORE_FT
[facility]  MATERNAL  Virgilio  Scheduled Appointment: 05/10/2024     [facility] Phys[facility]  MATERNAL  Virgilio  Scheduled Appointment: 05/10/2024    Feng Sifuentes  [facility] Physician [facility]  Ada Bowman  Scheduled Appointment: 06/03/2024

## 2024-04-17 NOTE — DISCHARGE NOTE PROVIDER - NSDCMRMEDTOKEN_GEN_ALL_CORE_FT
enoxaparin 100 mg/mL injectable solution: 100 milligram(s) subcutaneously every 12 hours  Prenatal Multivitamins with Folic Acid 0.4 mg oral tablet: 1 tab(s) orally once a day   cephalexin 500 mg oral capsule: 1 cap(s) orally 4 times a day Discontinue after the 2nd dose on April 19, 2024  enoxaparin 100 mg/mL injectable solution: 100 milligram(s) subcutaneously every 12 hours  Prenatal Multivitamins with Folic Acid 0.4 mg oral tablet: 1 tab(s) orally once a day

## 2024-04-17 NOTE — DISCHARGE NOTE NURSING/CASE MANAGEMENT/SOCIAL WORK - PATIENT PORTAL LINK FT
You can access the FollowMyHealth Patient Portal offered by [facility] by registering at the following website: [email or web address]. By joining [facility]’s FollowMyHealth portal, you will also be able to view your health information using other applications (apps) compatible with our system.

## 2024-04-17 NOTE — DISCHARGE NOTE PROVIDER - HOSPITAL COURSE
HPI:  33F s/p  on 3/26 pw left hand pain. Patient reports she had had a left radial a-line placed for her  on 3/26 for increased monitoring due to concerns for placenta acrreta. At that time, she experienced left wrist pain which has since been intermittent in nature. Last night, she began to experience intense left thumb pain and noticed purple spots on her thumb so decided to come to ER. She denies any weakness, but has some numbness and tingling in the left thumb however now improving. denies trauma to the left hand. got her nails done last week and when she noticed the swelling and pain of her wrist she took her tip off. patient denies any fevers, chills, night sweats, rashes, joint pain.  (2024 11:30)    Hospital Course:   LUE ultrasound obtained in ED sf nonocclusive thrombus of the left radial artery. likely in setting of left radial ate rial line, clot nonocclusive but possible it was and it broke off causing capillary damage. started heparin drip. vascular surgery  requesting hand surgery cs- placed with Dr Macias ; left hand xray no fractures; no signs or symptoms of comparement syndrome, pulses intact no vascular compromise. also d/w hematology follow- APLS labs sent- to see; advised to void DOAC on dc since patient wants to continue to breastfeed   Left thumb with scattered petechiae and tenderness of distal phalynx, but no evidence of infx or darshan ischemia. No acute plastic/hand surgical intervention at this time   switched to  Lovenox 1 mg/kg subQ qd for 3 months  per hem onc  f/u APLS labs. pt to  follow up at the [facility] after discharge.  Medically cleared for discharge Home on Lovenox sq for 3 months    Important Medication Changes and Reason:    Active or Pending Issues Requiring Follow-up:  > hematology follow up for radial artery clot  > plastics follow up  Advanced Directives:   [x ] Full code  [ ] DNR  [ ] Hospice    Discharge Diagnoses:  . R radial artery thrombus  s/p c section       HPI:  33F s/p  on 3/26 pw left hand pain. Patient reports she had had a left radial a-line placed for her  on 3/26 for increased monitoring due to concerns for placenta acrreta. At that time, she experienced left wrist pain which has since been intermittent in nature. Last night, she began to experience intense left thumb pain and noticed purple spots on her thumb so decided to come to ER. She denies any weakness, but has some numbness and tingling in the left thumb however now improving. denies trauma to the left hand. got her nails done last week and when she noticed the swelling and pain of her wrist she took her tip off. patient denies any fevers, chills, night sweats, rashes, joint pain.  (2024 11:30)    Hospital Course:   LUE ultrasound obtained in ED sf nonocclusive thrombus of the left radial artery. likely in setting of left radial ate rial line, clot nonocclusive but possible it was and it broke off causing capillary damage. started heparin drip. vascular surgery  requesting hand surgery cs- placed with Dr Macias ; left hand xray no fractures; no signs or symptoms of compartment syndrome, pulses intact no vascular compromise. also d/w hematology follow- APLS labs sent- to see; advised to void DOAC on dc since patient wants to continue to breastfeed   Left thumb with scattered petechiae and tenderness of distal phalynx, but no evidence of infx or darshan ischemia. No acute plastic/hand surgical intervention at this time   switched to  Lovenox 1 mg/kg subQ qd for 3 months  per hem onc  f/u APLS labs. pt to  follow up at the [facility] after discharge.  Medically cleared for discharge Home on Lovenox sq for 3 months    Important Medication Changes and Reason:    Active or Pending Issues Requiring Follow-up:  > hematology follow up for radial artery clot  > plastics follow up  Advanced Directives:   [x ] Full code  [ ] DNR  [ ] Hospice    Discharge Diagnoses:  . R radial artery thrombus  s/p c section       HPI:  33F s/p  on 3/26 pw left hand pain. Patient reports she had had a left radial a-line placed for her  on 3/26 for increased monitoring due to concerns for placenta acrreta. At that time, she experienced left wrist pain which has since been intermittent in nature. Last night, she began to experience intense left thumb pain and noticed purple spots on her thumb so decided to come to ER. She denies any weakness, but has some numbness and tingling in the left thumb however now improving. denies trauma to the left hand. got her nails done last week and when she noticed the swelling and pain of her wrist she took her tip off. patient denies any fevers, chills, night sweats, rashes, joint pain.  (2024 11:30)    Hospital Course:   LUE ultrasound obtained in ED sf nonocclusive thrombus of the left radial artery. likely in setting of left radial ate rial line, clot nonocclusive but possible it was and it broke off causing capillary damage. started heparin drip. vascular surgery  requesting hand surgery cs- placed with Dr Macias ; left hand xray no fractures; no signs or symptoms of compartment syndrome, pulses intact no vascular compromise. also d/w hematology follow- APLS labs sent- to see; advised to void DOAC on dc since patient wants to continue to breastfeed   Left thumb with scattered petechiae and tenderness of distal phalynx, but no evidence of infx or darshan ischemia. No acute plastic/hand surgical intervention at this time   switched to  Lovenox 1 mg/kg subQ qd for 3 months  TTE unremarkable  per hem onc  f/u APLS labs. pt to  follow up at the [facility] after discharge.  Medically cleared for discharge Home on Lovenox sq for 3 months    Important Medication Changes and Reason:    Active or Pending Issues Requiring Follow-up:  > hematology follow up for radial artery clot  > plastics follow up  Advanced Directives:   [x ] Full code  [ ] DNR  [ ] Hospice    Discharge Diagnoses:  . R radial artery thrombus  s/p c section

## 2024-04-17 NOTE — DISCHARGE NOTE NURSING/CASE MANAGEMENT/SOCIAL WORK - NSDCFUADDAPPT_GEN_ALL_CORE_FT
You must follow up with your primary medical doctor within 3-4 days of discharge - please call to make an appointment.    You must follow up with a hematologist within one week of discharge - if you do not have one, you can follow up at the [facility] [phone].    You must follow up with your plastic surgeon, Dr. Gomez, within one week of discharge - please call to make an appointment.    Follow up with your OBGYN as previously instructed.              APPTS ARE READY TO BE MADE: [x] YES    Best Family or Patient Contact (if needed):    Additional Information about above appointments (if needed):    1: Primary medical doctor  2: Hematologist  3: Plastic Surgeon    Other comments or requests:

## 2024-04-17 NOTE — CHART NOTE - NSCHARTNOTEFT_GEN_A_CORE
Request from Dr. Leone to facilitate patient discharge.  Medication reconciliation reviewed, revised, and resolved with Dr. Leone, who has medically cleared patient for discharge with follow up as advised.  Please refer to discharge note for detailed hospital course.
PAST SURGICAL HISTORY:  No significant past surgical history     No significant past surgical history

## 2024-04-17 NOTE — DISCHARGE NOTE PROVIDER - NSDCCPCAREPLAN_GEN_ALL_CORE_FT
PRINCIPAL DISCHARGE DIAGNOSIS  Diagnosis: Thrombosis of radial artery  Assessment and Plan of Treatment: continue lovenox for 3 months  follow up with hematology for results of APLS labs  follow up with plastic surgery     PRINCIPAL DISCHARGE DIAGNOSIS  Diagnosis: Thrombosis of radial artery  Assessment and Plan of Treatment: continue lovenox for 3 months  follow up with hematology within one week for results of APLS labs and Lovenox renewal  You will take an antibiotic (Keflex) to complete a 3 day course (stop after 2nd dose on 2024)  follow up with plastic surgery, Dr. Gomez, upon discharge      SECONDARY DISCHARGE DIAGNOSES  Diagnosis: S/P  section  Assessment and Plan of Treatment: Follow up with your OBGYN

## 2024-04-17 NOTE — DISCHARGE NOTE PROVIDER - CARE PROVIDER_API CALL
Fredi Gomez  Plastic Surgery  [address] [place] [address], [address]  [place] [address]  Phone: [phone]  Fax: [phone]  Follow Up Time: 1 week   Fredi Gomez  Plastic Surgery  [address] [place] [address], [address]  [place] [address]  Phone: [phone]  Fax: [phone]  Follow Up Time: 1 week    [facility],   Phone: [phone]  Fax: (   )    -  Follow Up Time: 1 week

## 2024-04-17 NOTE — DISCHARGE NOTE PROVIDER - CARE PROVIDERS DIRECT ADDRESSES
,[email or web address]

## 2024-04-17 NOTE — DISCHARGE NOTE PROVIDER - NSDCFUADDAPPT_GEN_ALL_CORE_FT
APPTS ARE READY TO BE MADE: [x] YES    Best Family or Patient Contact (if needed):    Additional Information about above appointments (if needed):    1:   2:   3:     Other comments or requests:    You must follow up with your primary medical doctor within 3-4 days of discharge - please call to make an appointment.    You must follow up with a hematologist within one week of discharge - if you do not have one, you can follow up at the [facility] [phone].    You must follow up with your plastic surgeon, Dr. Gomez, within one week of discharge - please call to make an appointment.    Follow up with your OBGYN as previously instructed.              APPTS ARE READY TO BE MADE: [x] YES    Best Family or Patient Contact (if needed):    Additional Information about above appointments (if needed):    1: Primary medical doctor  2: Hematologist  3: Plastic Surgeon    Other comments or requests:    You must follow up with your primary medical doctor within 3-4 days of discharge - please call to make an appointment.    You must follow up with a hematologist within one week of discharge - if you do not have one, you can follow up at the [facility] [phone].    You must follow up with your plastic surgeon, Dr. Gomez, within one week of discharge - please call to make an appointment.    Follow up with your OBGYN as previously instructed.              APPTS ARE READY TO BE MADE: [x] YES    Best Family or Patient Contact (if needed):    Additional Information about above appointments (if needed):    1: Primary medical doctor  2: Hematologist  3: Plastic Surgeon    Other comments or requests:     Patient was outreached but declined scheduling assistance, however there is an appointment reflected on [facility] for the patient. Patient was scheduled to see Dr. Shannon at 10AM on 4/24 at [address] [place] [address]    Patient was outreached but declined scheduling assistance, however there is an appointment reflected on [facility] for the patient. Patient is scheduled to see Dr. Sifuentes at 1pm on 6/3 at [address] [place] [address]      Patient declined scheduling assistance for Plastic Surgery, though requested assistance navigating their Vascular Medicine referral made by PCP Dr. Vasyl Shannon's 4/24 appointment. The patient is scheduled to establish care with Dr. Camilo at 1PM on 5/23 at [address] [place] [address]

## 2024-04-17 NOTE — PHARMACOTHERAPY INTERVENTION NOTE - COMMENTS
Counseled patient on the following discharge medication names (brand/generic), indication, and possible side effects:  enoxaparin 100 mg SQ q12H     Patient questions and concerns were answered and addressed. Patient demonstrated understanding.     Darrell Buckley, PharmD  PGY1 Pharmacy Resident  Available on Teams  Counseled patient on the following discharge medication names (brand/generic), indication, and possible side effects:  enoxaparin 100 mg SQ q12H     Medication was sent to [facility]. Patient questions and concerns were answered and addressed. Patient demonstrated understanding.     Darrell Buckley, PharmD  PGY1 Pharmacy Resident  Available on Teams  Counseled patient on the following discharge medication names (brand/generic), indication, and possible side effects:  enoxaparin 100 mg SQ q12H     Medication was sent to [facility] and copay is $0. Patient questions and concerns were answered and addressed. Patient demonstrated understanding.     Darrell Buckley, PharmD  PGY1 Pharmacy Resident  Available on Teams

## 2024-04-17 NOTE — DISCHARGE NOTE PROVIDER - NSFOLLOWUPCLINICS_GEN_ALL_ED_FT
[facility]  Hematology/Oncology  [address] [id number]  Phone: [phone]  Fax:   Follow Up Time: 1 week

## 2024-04-17 NOTE — DISCHARGE NOTE PROVIDER - ATTENDING DISCHARGE PHYSICAL EXAMINATION:
Vital Signs Last 24 Hrs  T(C): 37.2 (17 Apr 2024 04:57), Max: 37.2 (17 Apr 2024 04:57)  T(F): 99 (17 Apr 2024 04:57), Max: 99 (17 Apr 2024 04:57)  HR: 61 (17 Apr 2024 04:57) (61 - 75)  BP: 100/63 (17 Apr 2024 04:57) (93/62 - 106/73)  BP(mean): --  RR: 17 (17 Apr 2024 04:57) (17 - 18)  SpO2: 97% (17 Apr 2024 04:57) (97% - 99%)    Parameters below as of 17 Apr 2024 04:57  Patient On (Oxygen Delivery Method): room air    GENERAL: Well appearing, in NAD  EYES: EOMI, conjunctiva and sclera clear  ENT: moist mucosa, no pharyngeal erythema  NECK: supple, no JVD  LUNG: Clear to auscultation bilaterally; No rales, rhonchi, wheezing, or rubs.   CVS: Regular rate and rhythm; No murmurs, rubs, or gallops  ABDOMEN: Soft, non tender, nondistended. +Bowel sounds.  EXTREMITIES:  L thumb base with mild erythema and tenderness, pulses intact  NEURO:  Alert & Oriented X3,  No focal deficits  PSYCH: Normal affect, normal mood  MUSCULOSKELETAL: FROM, no joint swelling  Skin: warm and dry, normal color

## 2024-04-17 NOTE — DISCHARGE NOTE NURSING/CASE MANAGEMENT/SOCIAL WORK - NSDCPEFALRISK_GEN_ALL_CORE
For information on Fall & Injury Prevention, visit: [email or web address] OR  [email or web address] OR  https://www.cdc.gov/steadi/patient.html

## 2024-04-17 NOTE — DISCHARGE NOTE PROVIDER - PROVIDER TOKENS
PROVIDER:[TOKEN:[[id number]],FOLLOWUP:[1 week]] PROVIDER:[TOKEN:[[id number]],FOLLOWUP:[1 week]],FREE:[LAST:[[facility]],PHONE:[phone]],FAX:[(   )    -],FOLLOWUP:[1 week]]

## 2024-04-20 ENCOUNTER — NON-APPOINTMENT (OUTPATIENT)
Age: 34
End: 2024-04-20

## 2024-04-22 RX ORDER — KRILL/OM-3/DHA/EPA/PHOSPHO/AST 1000-230MG
81 CAPSULE ORAL
Qty: 1 | Refills: 2 | Status: COMPLETED | COMMUNITY
Start: 2023-10-04 | End: 2024-04-22

## 2024-04-22 RX ORDER — FAMOTIDINE 40 MG/1
40 TABLET, FILM COATED ORAL
Qty: 30 | Refills: 0 | Status: COMPLETED | COMMUNITY
Start: 2023-11-02 | End: 2024-04-22

## 2024-04-22 RX ORDER — NITROFURANTOIN (MONOHYDRATE/MACROCRYSTALS) 25; 75 MG/1; MG/1
100 CAPSULE ORAL
Qty: 14 | Refills: 0 | Status: COMPLETED | COMMUNITY
Start: 2023-11-29 | End: 2024-04-22

## 2024-04-22 RX ORDER — ALPRAZOLAM 2 MG/1
2 TABLET ORAL
Qty: 2 | Refills: 0 | Status: COMPLETED | COMMUNITY
Start: 2024-02-07 | End: 2024-04-22

## 2024-04-22 RX ORDER — FAMOTIDINE 40 MG/1
40 TABLET, FILM COATED ORAL
Qty: 180 | Refills: 3 | Status: COMPLETED | COMMUNITY
Start: 2023-08-30 | End: 2024-04-22

## 2024-04-24 ENCOUNTER — APPOINTMENT (OUTPATIENT)
Dept: INTERNAL MEDICINE | Facility: CLINIC | Age: 34
End: 2024-04-24
Payer: MEDICAID

## 2024-04-24 VITALS
SYSTOLIC BLOOD PRESSURE: 110 MMHG | DIASTOLIC BLOOD PRESSURE: 70 MMHG | HEART RATE: 73 BPM | BODY MASS INDEX: 39.2 KG/M2 | HEIGHT: 62 IN | WEIGHT: 213 LBS

## 2024-04-24 VITALS
OXYGEN SATURATION: 95 % | HEART RATE: 73 BPM | WEIGHT: 213 LBS | SYSTOLIC BLOOD PRESSURE: 109 MMHG | TEMPERATURE: 97.7 F | BODY MASS INDEX: 39.2 KG/M2 | HEIGHT: 62 IN | DIASTOLIC BLOOD PRESSURE: 68 MMHG

## 2024-04-24 PROCEDURE — 99213 OFFICE O/P EST LOW 20 MIN: CPT

## 2024-04-24 NOTE — HISTORY OF PRESENT ILLNESS
[FreeTextEntry1] : f/u complicated c section [de-identified] : \[facility]\"" 3/26 to 3/30 emrgency c section need a line following a line left thumb emmnauel and pian finger hand partial occlusion left radial artery 3 months of lovenox breast feeding so no warfarin or doac on pre marlee no fh or presonal hx of clotting mild pain persist but colo normalized thumb some spots on abd from lovenox injection upcoming appt heme

## 2024-05-08 ENCOUNTER — NON-APPOINTMENT (OUTPATIENT)
Age: 34
End: 2024-05-08

## 2024-05-10 ENCOUNTER — APPOINTMENT (OUTPATIENT)
Dept: MATERNAL FETAL MEDICINE | Facility: CLINIC | Age: 34
End: 2024-05-10
Payer: MEDICAID

## 2024-05-10 ENCOUNTER — OUTPATIENT (OUTPATIENT)
Dept: OUTPATIENT SERVICES | Facility: HOSPITAL | Age: 34
LOS: 1 days | End: 2024-05-10
Payer: MEDICAID

## 2024-05-10 VITALS
HEIGHT: 62 IN | TEMPERATURE: 98 F | OXYGEN SATURATION: 99 % | HEART RATE: 68 BPM | SYSTOLIC BLOOD PRESSURE: 102 MMHG | DIASTOLIC BLOOD PRESSURE: 70 MMHG | BODY MASS INDEX: 39.06 KG/M2 | WEIGHT: 212.25 LBS

## 2024-05-10 DIAGNOSIS — Z90.49 ACQUIRED ABSENCE OF OTHER SPECIFIED PARTS OF DIGESTIVE TRACT: Chronic | ICD-10-CM

## 2024-05-10 DIAGNOSIS — I74.2 EMBOLISM AND THROMBOSIS OF ARTERIES OF THE UPPER EXTREMITIES: ICD-10-CM

## 2024-05-10 DIAGNOSIS — Z98.890 OTHER SPECIFIED POSTPROCEDURAL STATES: Chronic | ICD-10-CM

## 2024-05-10 DIAGNOSIS — Z98.891 HISTORY OF UTERINE SCAR FROM PREVIOUS SURGERY: Chronic | ICD-10-CM

## 2024-05-10 PROCEDURE — G0463: CPT

## 2024-05-10 PROCEDURE — 99213 OFFICE O/P EST LOW 20 MIN: CPT | Mod: TH,GC

## 2024-05-13 ENCOUNTER — NON-APPOINTMENT (OUTPATIENT)
Age: 34
End: 2024-05-13

## 2024-05-21 ENCOUNTER — OUTPATIENT (OUTPATIENT)
Dept: OUTPATIENT SERVICES | Facility: HOSPITAL | Age: 34
LOS: 1 days | End: 2024-05-21
Payer: MEDICAID

## 2024-05-21 ENCOUNTER — APPOINTMENT (OUTPATIENT)
Dept: OBGYN | Facility: CLINIC | Age: 34
End: 2024-05-21
Payer: MEDICAID

## 2024-05-21 ENCOUNTER — NON-APPOINTMENT (OUTPATIENT)
Age: 34
End: 2024-05-21

## 2024-05-21 VITALS — SYSTOLIC BLOOD PRESSURE: 112 MMHG | BODY MASS INDEX: 38.59 KG/M2 | DIASTOLIC BLOOD PRESSURE: 72 MMHG | WEIGHT: 211 LBS

## 2024-05-21 DIAGNOSIS — Z98.890 OTHER SPECIFIED POSTPROCEDURAL STATES: Chronic | ICD-10-CM

## 2024-05-21 DIAGNOSIS — N76.0 ACUTE VAGINITIS: ICD-10-CM

## 2024-05-21 DIAGNOSIS — Z98.891 HISTORY OF UTERINE SCAR FROM PREVIOUS SURGERY: Chronic | ICD-10-CM

## 2024-05-21 DIAGNOSIS — Z90.49 ACQUIRED ABSENCE OF OTHER SPECIFIED PARTS OF DIGESTIVE TRACT: Chronic | ICD-10-CM

## 2024-05-21 DIAGNOSIS — T81.89XA OTHER COMPLICATIONS OF PROCEDURES, NOT ELSEWHERE CLASSIFIED, INITIAL ENCOUNTER: ICD-10-CM

## 2024-05-21 PROCEDURE — 99213 OFFICE O/P EST LOW 20 MIN: CPT

## 2024-05-21 PROCEDURE — G0463: CPT

## 2024-05-21 NOTE — DISCUSSION/SUMMARY
[FreeTextEntry1] : 34yo P3 s/p CS 8wks ago (vertical skin 2/2 previa)-   here for incision check.  pt taking lovenox (until 12wks pp) - incision c/d/i with areas of induration.   no evidence of infection, abscess, or dehiscence- discussed healing/ architectural changes as part of process. - reassured pt.  cont with abd binder.    RTC prn  Tylor Brown PAC DVT prophylaxis: HSQ.  Diet: tube feeds.  Dispo: NADIRA - 1199 denied, need written appeal. SW working on a letter to send to Insurance. Applying for Medicaid.      Paperwork for disability filled out for this patient and returned to her on 7/17 by prior team  D/w ACP

## 2024-05-21 NOTE — PHYSICAL EXAM
[Soft] : soft [Non-tender] : non-tender [Non-distended] : non-distended [FreeTextEntry7] : incision c/d/i.  areas of induration noted, non tender/ no erythema.  no evidence of underlying collection.

## 2024-05-21 NOTE — HISTORY OF PRESENT ILLNESS
[FreeTextEntry1] : 32yo P3  s/p rpt CS, RS and lt tubal ligation with HRC 3/26/24 (previa) presents for incision check. (on Lovenox pp  for 3months)  Pt feels hard area in vertical incision scar .  Denies fever/ chills/ drainage/ tenderness  Incision still sore deep inside.  Feels similar harness at Lovenox injection sites.

## 2024-05-22 DIAGNOSIS — T81.89XA OTHER COMPLICATIONS OF PROCEDURES, NOT ELSEWHERE CLASSIFIED, INITIAL ENCOUNTER: ICD-10-CM

## 2024-05-23 ENCOUNTER — APPOINTMENT (OUTPATIENT)
Dept: CARDIOLOGY | Facility: CLINIC | Age: 34
End: 2024-05-23
Payer: MEDICAID

## 2024-05-23 ENCOUNTER — NON-APPOINTMENT (OUTPATIENT)
Age: 34
End: 2024-05-23

## 2024-05-23 VITALS — HEIGHT: 62 IN | WEIGHT: 212 LBS | BODY MASS INDEX: 39.01 KG/M2

## 2024-05-23 VITALS — DIASTOLIC BLOOD PRESSURE: 80 MMHG | OXYGEN SATURATION: 99 % | SYSTOLIC BLOOD PRESSURE: 114 MMHG | HEART RATE: 69 BPM

## 2024-05-23 DIAGNOSIS — Z13.6 ENCOUNTER FOR SCREENING FOR CARDIOVASCULAR DISORDERS: ICD-10-CM

## 2024-05-23 DIAGNOSIS — Z71.89 OTHER SPECIFIED COUNSELING: ICD-10-CM

## 2024-05-23 DIAGNOSIS — I70.208 UNSPECIFIED ATHEROSCLEROSIS OF NATIVE ARTERIES OF EXTREMITIES, OTHER EXTREMITY: ICD-10-CM

## 2024-05-23 PROCEDURE — 93000 ELECTROCARDIOGRAM COMPLETE: CPT

## 2024-05-23 PROCEDURE — 99204 OFFICE O/P NEW MOD 45 MIN: CPT | Mod: 25

## 2024-05-23 PROCEDURE — G2211 COMPLEX E/M VISIT ADD ON: CPT | Mod: NC,1L

## 2024-05-24 ENCOUNTER — OUTPATIENT (OUTPATIENT)
Dept: OUTPATIENT SERVICES | Facility: HOSPITAL | Age: 34
LOS: 1 days | Discharge: HOME | End: 2024-05-24

## 2024-05-24 DIAGNOSIS — Z98.890 OTHER SPECIFIED POSTPROCEDURAL STATES: Chronic | ICD-10-CM

## 2024-05-24 DIAGNOSIS — Z98.891 HISTORY OF UTERINE SCAR FROM PREVIOUS SURGERY: Chronic | ICD-10-CM

## 2024-05-24 DIAGNOSIS — Z90.49 ACQUIRED ABSENCE OF OTHER SPECIFIED PARTS OF DIGESTIVE TRACT: Chronic | ICD-10-CM

## 2024-05-24 DIAGNOSIS — D68.59 OTHER PRIMARY THROMBOPHILIA: ICD-10-CM

## 2024-05-28 ENCOUNTER — RESULT REVIEW (OUTPATIENT)
Age: 34
End: 2024-05-28

## 2024-05-28 ENCOUNTER — APPOINTMENT (OUTPATIENT)
Dept: CV DIAGNOSITCS | Facility: HOSPITAL | Age: 34
End: 2024-05-28

## 2024-05-28 ENCOUNTER — OUTPATIENT (OUTPATIENT)
Dept: OUTPATIENT SERVICES | Facility: HOSPITAL | Age: 34
LOS: 1 days | End: 2024-05-28
Payer: MEDICAID

## 2024-05-28 DIAGNOSIS — Z90.49 ACQUIRED ABSENCE OF OTHER SPECIFIED PARTS OF DIGESTIVE TRACT: Chronic | ICD-10-CM

## 2024-05-28 DIAGNOSIS — I70.208 UNSPECIFIED ATHEROSCLEROSIS OF NATIVE ARTERIES OF EXTREMITIES, OTHER EXTREMITY: ICD-10-CM

## 2024-05-28 DIAGNOSIS — Z98.891 HISTORY OF UTERINE SCAR FROM PREVIOUS SURGERY: Chronic | ICD-10-CM

## 2024-05-28 DIAGNOSIS — Z98.890 OTHER SPECIFIED POSTPROCEDURAL STATES: Chronic | ICD-10-CM

## 2024-05-28 PROCEDURE — 93971 EXTREMITY STUDY: CPT | Mod: 26,LT

## 2024-05-28 PROCEDURE — 93931 UPPER EXTREMITY STUDY: CPT | Mod: 26,LT

## 2024-06-03 ENCOUNTER — APPOINTMENT (OUTPATIENT)
Dept: HEMATOLOGY ONCOLOGY | Facility: CLINIC | Age: 34
End: 2024-06-03
Payer: MEDICAID

## 2024-06-03 VITALS
OXYGEN SATURATION: 99 % | WEIGHT: 213.85 LBS | DIASTOLIC BLOOD PRESSURE: 89 MMHG | TEMPERATURE: 98 F | SYSTOLIC BLOOD PRESSURE: 131 MMHG | RESPIRATION RATE: 16 BRPM | HEIGHT: 63.31 IN | BODY MASS INDEX: 37.42 KG/M2 | HEART RATE: 77 BPM

## 2024-06-03 DIAGNOSIS — Z79.01 ENCOUNTER FOR THERAPEUTIC DRUG LVL MONITORING: ICD-10-CM

## 2024-06-03 DIAGNOSIS — Z51.81 ENCOUNTER FOR THERAPEUTIC DRUG LVL MONITORING: ICD-10-CM

## 2024-06-03 DIAGNOSIS — I74.9 EMBOLISM AND THROMBOSIS OF UNSPECIFIED ARTERY: ICD-10-CM

## 2024-06-03 DIAGNOSIS — D68.59 OTHER PRIMARY THROMBOPHILIA: ICD-10-CM

## 2024-06-03 DIAGNOSIS — Z79.01 LONG TERM (CURRENT) USE OF ANTICOAGULANTS: ICD-10-CM

## 2024-06-03 PROCEDURE — 99204 OFFICE O/P NEW MOD 45 MIN: CPT

## 2024-06-03 NOTE — ASSESSMENT
[FreeTextEntry1] : 33-year-old Ms. RIVERO is seen in consult for L radial artery thrombosis following an A-line placement to monitor patient during . It was a provoked episode. However, LA was positive. She was started on LMWH therapeutic dose which she took only for 6 seeks. She has no symptoms. She has an US of LUE last week which was reportedly negative for clot.    [] L Radial artery thrombosis following an A-line placement in the setting of pregnancy/delivery  - Provoked episode, no previous personal or family h/o clot.  - LA positive, ACL ab and B2GP ab negative - Completed six weeks of AC (LMWH)   - Stopped last week (as she ran out of stock  - Had and LUE USG last week  - Reportedly negative for clot  - D-dimer today  - Can start ASA 81 mg daily  - Repeat LA testing in 6 weeks (12 weeks since dx)  - RTC in 8 weeks

## 2024-06-03 NOTE — RESULTS/DATA
[FreeTextEntry1] : 6/3/2024 Hospital labs reviewed LA positive ACL Ab - Negative B2GP Ab - Negative

## 2024-06-03 NOTE — REASON FOR VISIT
[Initial Consultation] : an initial consultation for [FreeTextEntry2] : Radial artery thrombosis - hypercoagulable work up

## 2024-06-03 NOTE — HISTORY OF PRESENT ILLNESS
[de-identified] : (6/3/2024) 33-yr-old woman s/p  developed non-occlusive thrombosis in the L radial artery in the setting of pregnancy and delivery seen in consult for thrombophilia w/u. The patient does not have any personal or family h/o clot. She had an A-line for monitoring during  procedure. This was clearly a provoked episode did not need any thrombophilia w/u except APLA testing. LA returned positive, ACL and B2GP Abs were negative. She was started on therapeutic dose LMWH for 3 months as the AC agent. She completed 6 weeks of AC thus far and then has stopped taking it as her stock exhausted. Her vascular surgeon has done an US of her L arm last week which reportedly showed no clot. She has no complaints of pain in her L hand.     Hospital record review:  33F s/p  on 3/26 pw left hand pain. Patient reports she had had a left radial a-line placed for her  on 3/26 for increased monitoring due to concerns for placenta acrreta. At that time, she experienced left wrist pain which has since been intermittent in nature. Last night, she began to experience intense left thumb pain and noticed purple spots on her thumb so decided to come to ER. She denies any weakness, but has some numbness and tingling in the left thumb however now improving. denies trauma to the left hand. got her nails done last week and when she noticed the swelling and pain of her wrist she took her tip off. patient denies any fevers, chills, night sweats, rashes, joint pain. (2024 11:30)

## 2024-06-04 ENCOUNTER — EMERGENCY (EMERGENCY)
Facility: HOSPITAL | Age: 34
LOS: 1 days | Discharge: HOME | End: 2024-06-04
Attending: STUDENT IN AN ORGANIZED HEALTH CARE EDUCATION/TRAINING PROGRAM | Admitting: STUDENT IN AN ORGANIZED HEALTH CARE EDUCATION/TRAINING PROGRAM
Payer: MEDICAID

## 2024-06-04 VITALS
WEIGHT: 212.08 LBS | HEIGHT: 62 IN | TEMPERATURE: 98 F | DIASTOLIC BLOOD PRESSURE: 94 MMHG | OXYGEN SATURATION: 99 % | HEART RATE: 76 BPM | SYSTOLIC BLOOD PRESSURE: 139 MMHG | RESPIRATION RATE: 19 BRPM

## 2024-06-04 DIAGNOSIS — Z90.49 ACQUIRED ABSENCE OF OTHER SPECIFIED PARTS OF DIGESTIVE TRACT: Chronic | ICD-10-CM

## 2024-06-04 DIAGNOSIS — Z98.890 OTHER SPECIFIED POSTPROCEDURAL STATES: Chronic | ICD-10-CM

## 2024-06-04 DIAGNOSIS — Z98.891 HISTORY OF UTERINE SCAR FROM PREVIOUS SURGERY: Chronic | ICD-10-CM

## 2024-06-04 PROCEDURE — 93010 ELECTROCARDIOGRAM REPORT: CPT

## 2024-06-04 PROCEDURE — 99284 EMERGENCY DEPT VISIT MOD MDM: CPT | Mod: 25

## 2024-06-04 RX ORDER — KETOROLAC TROMETHAMINE 30 MG/ML
15 SYRINGE (ML) INJECTION ONCE
Refills: 0 | Status: DISCONTINUED | OUTPATIENT
Start: 2024-06-04 | End: 2024-06-04

## 2024-06-04 RX ADMIN — Medication 15 MILLIGRAM(S): at 23:40

## 2024-06-04 NOTE — ED PROVIDER NOTE - DISPOSITION TYPE
[facility] Postpartum Phone Call    1st initial post discharge call Spoke to patient on cell phone    1. How are you and your baby doing? good  2. How are the feedings going? Good, baby is latching better now on right side.  3. What questions do you have about voiding/stooling (baby)? None  4. Have you been able to schedule your follow up appointments? Yes tomorrow  5. What questions or concerns do you have? Home care nurse was out to see us, baby's weight was down. Encouraged Deepa to feed on cue and at least every 2-3 hours. May supplement by finger feeding with EBM using syringe and feeding tube provided by the [facility].    6. [facility].  What suggestions, if any do you have that would help us improve? None    DISCHARGE

## 2024-06-04 NOTE — ED CLERICAL - NS ED CLERK NOTE PRE-ARRIVAL INFORMATION; ADDITIONAL PRE-ARRIVAL INFORMATION
This patient is eligible for (or currently enrolled in) an outpatient care management program available through [employer]. This program can coordinate outpatient follow up and assist the patient in accessing a variety of outpatient resources.  If discharged from the ED, the patient will be contacted to see if any additional resources are needed.                                                                                    Please call the Nurse Clinical Call Center at [phone] with any questions or for assistance in discharge planning.

## 2024-06-04 NOTE — ED PROVIDER NOTE - NSFOLLOWUPINSTRUCTIONS_ED_ALL_ED_FT
You were seen in the emergency department today. You had a blood work, an ultrasound and an x-ray, all of the results were reassuring. Please take ibuprofen and tylenol as needed for pain and rest the arm as much as possible. Please follow-up with your primary care provider but return to the emergency department for any new or worsening symptoms.

## 2024-06-04 NOTE — ED PROVIDER NOTE - PROGRESS NOTE DETAILS
MD Jaquelin: UE US is negative for DVT. CXR is clear. Lab work is unremarkable including negative d-dimer and troponin. Pt still having pain to arm- possible tendonitis. Recommended ibuprofen/tylenol and rest at home as much as possible (has a 2 month old) and to follow-up with her primary

## 2024-06-04 NOTE — ED ADULT NURSE NOTE - NSFALLUNIVINTERV_ED_ALL_ED
Bed/Stretcher in lowest position, wheels locked, appropriate side rails in place/Call bell, personal items and telephone in reach/Instruct patient to call for assistance before getting out of bed/chair/stretcher/Non-slip footwear applied when patient is off stretcher/[place] to call system/Physically safe environment - no spills, clutter or unnecessary equipment/Purposeful proactive rounding/Room/bathroom lighting operational, light cord in reach

## 2024-06-04 NOTE — ED PROVIDER NOTE - MUSCULOSKELETAL, MLM
Spine appears normal. Limited ROM of fingers on R hand secondary to pain, no tenderness to palpation, 2+ radial pulses, sensation intact

## 2024-06-04 NOTE — ED PROVIDER NOTE - PATIENT PORTAL LINK FT
You can access the FollowMyHealth Patient Portal offered by [facility] by registering at the following website: [email or web address]. By joining [employer]’s FollowMyHealth portal, you will also be able to view your health information using other applications (apps) compatible with our system.

## 2024-06-04 NOTE — ED PROVIDER NOTE - OBJECTIVE STATEMENT
32 yo F with h/o recent blood clot in L arm who presents to the ED with chest pain. She reports that she had the blood clot after having an A-line placed and was on lovenox until 11 days ago when she stopped taking it. Is currently undergoing work-up for clotting disorders and had a positive lupus anticoagulant test and is going to have a repeat test soon. Chest pain is under her L breast, worsens when she takes a deep breath. Also c/o R arm pain- both in her shoulder and then from her mid forearm radiating into her hand. Pain is exacerbated by movement. Denies palpitations, SOB, fever, chills, abdominal pain, n/v/d, numbness, weakness, tingling.

## 2024-06-04 NOTE — ED PROVIDER NOTE - CLINICAL SUMMARY MEDICAL DECISION MAKING FREE TEXT BOX
32 yo F with h/o recent blood clot, stopped taking lovenox 11 days ago who presents to the ED c/o L sided chest pain that began today. Also with R arm pain. Arm pain possible tendonitis vs tendosynovitis from picking up child- no tenderness to suggest fracture, sensation is intact. With recent blood clot and not being on blood thinners, concern for possible PE. Plan for labs including d-dimer, CXR, toradol for pain, re-assess

## 2024-06-04 NOTE — ED ADULT TRIAGE NOTE - CHIEF COMPLAINT QUOTE
Pt c/o chest pain, Rt arm pain. Denies injury, fall, sob. Hx Left arm blood clot recently stopped Lovenox. D

## 2024-06-04 NOTE — ED ADULT NURSE NOTE - OBJECTIVE STATEMENT
patient aaox4. ambulatory. came in with chest pain since today. hx blood clot in left arm due to prior arterial iv. on lovenox. 2 months post partum s/p c section due to placenta previa. chest pain is located under left breast, non radiating 3/10, intermittent. sitting makes it worse. no shortness of breath, cough, fevers, chills, nausea, vomiting. iv started to left hand #22g. labs drawn and sent. medicated as ordered. plan of care continues.

## 2024-06-05 VITALS
HEART RATE: 60 BPM | TEMPERATURE: 98 F | DIASTOLIC BLOOD PRESSURE: 80 MMHG | RESPIRATION RATE: 17 BRPM | OXYGEN SATURATION: 100 % | SYSTOLIC BLOOD PRESSURE: 121 MMHG

## 2024-06-05 LAB
ALBUMIN SERPL ELPH-MCNC: 4 G/DL — SIGNIFICANT CHANGE UP (ref 3.3–5)
ALP SERPL-CCNC: 73 U/L — SIGNIFICANT CHANGE UP (ref 40–120)
ALT FLD-CCNC: 46 U/L — HIGH (ref 4–33)
ANION GAP SERPL CALC-SCNC: 14 MMOL/L — SIGNIFICANT CHANGE UP (ref 7–14)
AST SERPL-CCNC: 30 U/L — SIGNIFICANT CHANGE UP (ref 4–32)
BILIRUB SERPL-MCNC: <0.2 MG/DL — SIGNIFICANT CHANGE UP (ref 0.2–1.2)
BUN SERPL-MCNC: 14 MG/DL — SIGNIFICANT CHANGE UP (ref 7–23)
CALCIUM SERPL-MCNC: 9.2 MG/DL — SIGNIFICANT CHANGE UP (ref 8.4–10.5)
CHLORIDE SERPL-SCNC: 102 MMOL/L — SIGNIFICANT CHANGE UP (ref 98–107)
CO2 SERPL-SCNC: 24 MMOL/L — SIGNIFICANT CHANGE UP (ref 22–31)
CREAT SERPL-MCNC: 0.68 MG/DL — SIGNIFICANT CHANGE UP (ref 0.5–1.3)
D DIMER BLD IA.RAPID-MCNC: 194 NG/ML DDU — SIGNIFICANT CHANGE UP
EGFR: 119 ML/MIN/1.73M2 — SIGNIFICANT CHANGE UP
GLUCOSE SERPL-MCNC: 96 MG/DL — SIGNIFICANT CHANGE UP (ref 70–99)
HCG SERPL-ACNC: <1 MIU/ML — SIGNIFICANT CHANGE UP
HCT VFR BLD CALC: 36.1 % — SIGNIFICANT CHANGE UP (ref 34.5–45)
HGB BLD-MCNC: 11.6 G/DL — SIGNIFICANT CHANGE UP (ref 11.5–15.5)
MCHC RBC-ENTMCNC: 26.5 PG — LOW (ref 27–34)
MCHC RBC-ENTMCNC: 32.1 GM/DL — SIGNIFICANT CHANGE UP (ref 32–36)
MCV RBC AUTO: 82.4 FL — SIGNIFICANT CHANGE UP (ref 80–100)
NRBC # BLD: 0 /100 WBCS — SIGNIFICANT CHANGE UP (ref 0–0)
NRBC # FLD: 0 K/UL — SIGNIFICANT CHANGE UP (ref 0–0)
PLATELET # BLD AUTO: 209 K/UL — SIGNIFICANT CHANGE UP (ref 150–400)
POTASSIUM SERPL-MCNC: 4.9 MMOL/L — SIGNIFICANT CHANGE UP (ref 3.5–5.3)
POTASSIUM SERPL-SCNC: 4.9 MMOL/L — SIGNIFICANT CHANGE UP (ref 3.5–5.3)
PROT SERPL-MCNC: 7.5 G/DL — SIGNIFICANT CHANGE UP (ref 6–8.3)
RBC # BLD: 4.38 M/UL — SIGNIFICANT CHANGE UP (ref 3.8–5.2)
RBC # FLD: 13.5 % — SIGNIFICANT CHANGE UP (ref 10.3–14.5)
SODIUM SERPL-SCNC: 140 MMOL/L — SIGNIFICANT CHANGE UP (ref 135–145)
TROPONIN T, HIGH SENSITIVITY RESULT: <6 NG/L — SIGNIFICANT CHANGE UP
WBC # BLD: 6.43 K/UL — SIGNIFICANT CHANGE UP (ref 3.8–10.5)
WBC # FLD AUTO: 6.43 K/UL — SIGNIFICANT CHANGE UP (ref 3.8–10.5)

## 2024-06-05 PROCEDURE — 93971 EXTREMITY STUDY: CPT | Mod: 26,RT

## 2024-06-05 PROCEDURE — 71046 X-RAY EXAM CHEST 2 VIEWS: CPT | Mod: 26

## 2024-06-05 RX ORDER — ENOXAPARIN SODIUM 100 MG/ML
100 INJECTION, SOLUTION SUBCUTANEOUS
Qty: 6 | Refills: 1 | Status: COMPLETED | COMMUNITY
Start: 2024-04-22 | End: 2024-06-05

## 2024-06-19 NOTE — DISCHARGE NOTE OB - AVOID PROLONGED STANDING
22-year-old female with past medical history including seizures, kidney disorder presenting for multiple medical complaints.   Has had a recent extensive hospital stay for presenting complaints.  No change from baseline.  Will get CT head and labs.
Statement Selected

## 2024-06-20 NOTE — PATIENT PROFILE ADULT - NSPROSPHOSPCHAPLAINYN_GEN_A_NUR
EGD/COLONOSCOPY    DR Kumar    OFFICE [facility]__[phone]_________________     Call for follow up appointment       REPEAT PROCEDURE IN ____3____YEARS .    TEST ORDERED: NONE      What to Expect at Home    Your Recovery:EGD  The only discomfort after your EGD is generally limited to a mild soreness of the throat, which may last a day or two. Call your physician immediately if you have severe chest pain, shortness of breath or a temperature of 100 degrees or higher if taken orally.    Your Recovery: COLON   Your doctor will tell you when you can eat and do your other usual activitiesYour doctor will talk to you about when you will need your next colonoscopy. Your doctor can help you decide how often you need to be checked. This will depend on the results of your test and your risk for colorectal cancer.  After the test, you may be bloated or have gas pains. You may need to pass gas. If a biopsy was done or a polyp was removed, you may have streaks of blood in your stool (feces) for a few days.  This care sheet gives you a general idea about how long it will take for you to recover. But each person recovers at a different pace. Follow the steps below to get better as quickly as possible.    How can you care for yourself at home?  Activity  Rest as much as you need to after you go home.  You should be able to go back to your usual activities the day after the test.  Diet  Follow your doctor's directions for eating after the test.  Drink plenty of fluids (unless your doctor has told you not to).  Medications  If you have a sore throat the day after the test, use an over-the-counter spray to numb your throat.  Your doctor will tell you if and when you can restart your medicines. He or she will also give you instructions about taking any new medicines.  If you take blood thinners, such as warfarin (Coumadin), clopidogrel (Plavix), or aspirin, be sure to talk to your doctor. He or she will tell you if and when to start  taking those medicines again. Make sure that you understand exactly what your doctor wants you to do.  If a biopsy was done during the test, your doctor may tell you not to take aspirin or other anti-inflammatory medicines for a few days. These include ibuprofen (Advil, Motrin) and naproxen (Aleve).  DO NOT DRINK  ALCOHOL TODAY.    Other instructions:Anesthesia  For your safety, do not drive or operate machinery for 24 hours.  Do not sign legal documents or make major decisions for 24 hours. The anesthesia can make it hard for you to fully understand what you are agreeing to.      Follow-up care is a key part of your treatment and safety. Be sure to make and go to all appointments, and call your doctor if you are having problems. It's also a good idea to know your test results and keep a list of the medicines you take.  When should you call for help?  [facility] -[phone] OR [facility] [phone]  Call 911 anytime you think you may need emergency care. For example, call if:  You passed out (lost consciousness).  You cough up blood.  You vomit blood or what looks like coffee grounds.  You pass maroon or very bloody stools.  Call your doctor now or seek immediate medical care if:  You have trouble swallowing.  You have belly pain.  Your stools are black and tarlike or have streaks of blood.  You are sick to your stomach or cannot keep fluids down.  Watch closely for changes in your health, and be sure to contact your doctor if:  Your throat still hurts after a day or two.  You do not get better as expected.   Where can you learn more?   Go to [email or web address] and sign in to your [employer] [facility] [facility] account. Enter J454 in the Search Health Information box to learn more about “Upper GI Endoscopy: What to Expect at Home.”    If you do not have an account, please click on the “Sign Up Now” link.   © 0901-4762 [employer]wise[employer]. Care instructions  no

## 2024-07-03 DIAGNOSIS — M79.642 PAIN IN RIGHT HAND: ICD-10-CM

## 2024-07-03 DIAGNOSIS — M79.641 PAIN IN RIGHT HAND: ICD-10-CM

## 2024-07-09 ENCOUNTER — LABORATORY RESULT (OUTPATIENT)
Age: 34
End: 2024-07-09

## 2024-07-11 DIAGNOSIS — A69.23 ARTHRITIS DUE TO LYME DISEASE: ICD-10-CM

## 2024-07-11 DIAGNOSIS — M05.9 RHEUMATOID ARTHRITIS WITH RHEUMATOID FACTOR, UNSPECIFIED: ICD-10-CM

## 2024-07-11 LAB
ALBUMIN SERPL ELPH-MCNC: 4.4 G/DL
ALP BLD-CCNC: 86 U/L
ALT SERPL-CCNC: 37 U/L
ANA SER IF-ACNC: NEGATIVE
ANION GAP SERPL CALC-SCNC: 13 MMOL/L
APTT BLD: 34 SEC
AST SERPL-CCNC: 22 U/L
BILIRUB SERPL-MCNC: 0.5 MG/DL
BUN SERPL-MCNC: 11 MG/DL
CALCIUM SERPL-MCNC: 9.3 MG/DL
CARDIOLIPIN AB SER IA-ACNC: NEGATIVE
CARDIOLIPIN IGM SER-MCNC: 6.7 GPL
CARDIOLIPIN IGM SER-MCNC: 8.4 MPL
CCP AB SER IA-ACNC: >250 UNITS
CHLORIDE SERPL-SCNC: 101 MMOL/L
CHOLEST SERPL-MCNC: 134 MG/DL
CO2 SERPL-SCNC: 24 MMOL/L
CREAT SERPL-MCNC: 0.65 MG/DL
CRP SERPL-MCNC: 8 MG/L
DSDNA AB SER-ACNC: 2 IU/ML
EGFR: 121 ML/MIN/1.73M2
ERYTHROCYTE [SEDIMENTATION RATE] IN BLOOD BY WESTERGREN METHOD: 21 MM/HR
ESTIMATED AVERAGE GLUCOSE: 108 MG/DL
GLUCOSE SERPL-MCNC: 92 MG/DL
HBA1C MFR BLD HPLC: 5.4 %
HDLC SERPL-MCNC: 27 MG/DL
LDLC SERPL CALC-MCNC: 75 MG/DL
NONHDLC SERPL-MCNC: 107 MG/DL
POTASSIUM SERPL-SCNC: 4.9 MMOL/L
PROT SERPL-MCNC: 7.5 G/DL
RF+CCP IGG SER-IMP: ABNORMAL
RHEUMATOID FACT SER QL: 232 IU/ML
SODIUM SERPL-SCNC: 138 MMOL/L
T4 FREE SERPL-MCNC: 1.3 NG/DL
TRIGL SERPL-MCNC: 187 MG/DL
TSH SERPL-ACNC: 1.6 UIU/ML
URATE SERPL-MCNC: 5.1 MG/DL

## 2024-07-15 RX ORDER — DOXYCYCLINE 100 MG/1
100 CAPSULE ORAL
Qty: 42 | Refills: 1 | Status: DISCONTINUED | COMMUNITY
Start: 2024-07-11 | End: 2024-07-15

## 2024-07-15 RX ORDER — CEFUROXIME AXETIL 500 MG/1
500 TABLET ORAL TWICE DAILY
Qty: 28 | Refills: 0 | Status: ACTIVE | COMMUNITY
Start: 2024-07-15 | End: 1900-01-01

## 2024-07-20 ENCOUNTER — OUTPATIENT (OUTPATIENT)
Dept: OUTPATIENT SERVICES | Facility: HOSPITAL | Age: 34
LOS: 1 days | Discharge: HOME | End: 2024-07-20

## 2024-07-20 DIAGNOSIS — Z98.891 HISTORY OF UTERINE SCAR FROM PREVIOUS SURGERY: Chronic | ICD-10-CM

## 2024-07-20 DIAGNOSIS — D68.59 OTHER PRIMARY THROMBOPHILIA: ICD-10-CM

## 2024-07-20 DIAGNOSIS — Z90.49 ACQUIRED ABSENCE OF OTHER SPECIFIED PARTS OF DIGESTIVE TRACT: Chronic | ICD-10-CM

## 2024-07-20 DIAGNOSIS — D64.9 ANEMIA, UNSPECIFIED: ICD-10-CM

## 2024-07-20 DIAGNOSIS — Z98.890 OTHER SPECIFIED POSTPROCEDURAL STATES: Chronic | ICD-10-CM

## 2024-07-23 DIAGNOSIS — D68.59 OTHER PRIMARY THROMBOPHILIA: ICD-10-CM

## 2024-07-26 ENCOUNTER — APPOINTMENT (OUTPATIENT)
Dept: INTERNAL MEDICINE | Facility: CLINIC | Age: 34
End: 2024-07-26

## 2024-07-30 ENCOUNTER — APPOINTMENT (OUTPATIENT)
Dept: HEMATOLOGY ONCOLOGY | Facility: CLINIC | Age: 34
End: 2024-07-30
Payer: MEDICAID

## 2024-07-30 VITALS
RESPIRATION RATE: 15 BRPM | TEMPERATURE: 97.3 F | SYSTOLIC BLOOD PRESSURE: 101 MMHG | BODY MASS INDEX: 38.25 KG/M2 | OXYGEN SATURATION: 99 % | WEIGHT: 218.04 LBS | HEART RATE: 68 BPM | DIASTOLIC BLOOD PRESSURE: 69 MMHG

## 2024-07-30 DIAGNOSIS — Z79.01 LONG TERM (CURRENT) USE OF ANTICOAGULANTS: ICD-10-CM

## 2024-07-30 PROCEDURE — 99213 OFFICE O/P EST LOW 20 MIN: CPT

## 2024-07-31 LAB
CONFIRM: 29.3 SEC
DRVVT IMM 1:2 NP PPP: NORMAL
DRVVT SCREEN TO CONFIRM RATIO: 1.03 RATIO
SCREEN DRVVT: 35.5 SEC
SILICA CLOTTING TIME INTERPRETATION: NORMAL
SILICA CLOTTING TIME S/C: 1.02 RATIO

## 2024-07-31 NOTE — HISTORY OF PRESENT ILLNESS
[de-identified] : (6/3/2024) 33-yr-old woman s/p  developed non-occlusive thrombosis in the L radial artery in the setting of pregnancy and delivery seen in consult for thrombophilia w/u. The patient does not have any personal or family h/o clot. She had an A-line for monitoring during  procedure. This was clearly a provoked episode did not need any thrombophilia w/u except APLA testing. LA returned positive, ACL and B2GP Abs were negative. She was started on therapeutic dose LMWH for 3 months as the AC agent. She completed 6 weeks of AC thus far and then has stopped taking it as her stock exhausted. Her vascular surgeon has done an US of her L arm last week which reportedly showed no clot. She has no complaints of pain in her L hand.     Hospital record review:  33F s/p  on 3/26 pw left hand pain. Patient reports she had had a left radial a-line placed for her  on 3/26 for increased monitoring due to concerns for placenta acrreta. At that time, she experienced left wrist pain which has since been intermittent in nature. Last night, she began to experience intense left thumb pain and noticed purple spots on her thumb so decided to come to ER. She denies any weakness, but has some numbness and tingling in the left thumb however now improving. denies trauma to the left hand. got her nails done last week and when she noticed the swelling and pain of her wrist she took her tip off. patient denies any fevers, chills, night sweats, rashes, joint pain. (2024 11:30) [de-identified] : 6/3/2024 - Initial consult  7/30/3034 Patient seen in follow up for radial artery thrombosis. Interim, she tested +ve for lyme disease, just completed a course of abx for 2 weeks. She notes fatigue and bodyaches since testing positive for lymes - symptoms are unchanged with the abx tx. Otherwise, completed Lovenox. Denies any S&S of clots.

## 2024-07-31 NOTE — ASSESSMENT
[FreeTextEntry1] : 33-year-old Ms. RIVERO is seen in consult for L radial artery thrombosis following an A-line placement to monitor patient during . It was a provoked episode. However, LA was positive. She was started on LMWH therapeutic dose which she took only for 6 seeks. She has no symptoms. She has an US of HERNÁN last week which was reportedly negative for clot.   [ ] L Radial artery thrombosis following an A-line placement in the setting of pregnancy/delivery  - Provoked episode, no previous personal or family h/o clot - LA positive, ACL ab and B2GP ab negative - Completed six weeks of AC (LMWH)   - Stopped last week (as she ran out of stock) - Had and PATTIE USG in 2024, reportedly negative for clot  - Original plan to repeat LA testing in 6 weeks (12 weeks since dx) not obtained - Pending LA testing today - Can start ASA 81 mg daily   Case and management discussed with Dr. Sifuentes  Addendum 3034 Discussed BW results with the pt -  Normal D-dimer, PTT from this month. DRVVT and Silica clotting time WNL. No further follow up needed with heme unless other hematological concerns arise. All questions answered to the best of my abilities. Patient verbalized understanding and agreed with the plan.

## 2024-07-31 NOTE — RESULTS/DATA
[FreeTextEntry1] : 7/30/2024 Most recent D-dimer and aPTT on 7/9 normal Pending DRVVT, Silica clotting time   6/3/2024 Hospital labs reviewed LA positive ACL Ab - Negative B2GP Ab - Negative

## 2024-07-31 NOTE — REVIEW OF SYSTEMS
[Fatigue] : fatigue [Joint Pain] : joint pain [Joint Stiffness] : joint stiffness [Negative] : Heme/Lymph [Fever] : no fever [Chills] : no chills [Night Sweats] : no night sweats [Recent Change In Weight] : ~T no recent weight change [Muscle Pain] : no muscle pain [Muscle Weakness] : no muscle weakness

## 2024-07-31 NOTE — HISTORY OF PRESENT ILLNESS
[de-identified] : (6/3/2024) 33-yr-old woman s/p  developed non-occlusive thrombosis in the L radial artery in the setting of pregnancy and delivery seen in consult for thrombophilia w/u. The patient does not have any personal or family h/o clot. She had an A-line for monitoring during  procedure. This was clearly a provoked episode did not need any thrombophilia w/u except APLA testing. LA returned positive, ACL and B2GP Abs were negative. She was started on therapeutic dose LMWH for 3 months as the AC agent. She completed 6 weeks of AC thus far and then has stopped taking it as her stock exhausted. Her vascular surgeon has done an US of her L arm last week which reportedly showed no clot. She has no complaints of pain in her L hand.     Hospital record review:  33F s/p  on 3/26 pw left hand pain. Patient reports she had had a left radial a-line placed for her  on 3/26 for increased monitoring due to concerns for placenta acrreta. At that time, she experienced left wrist pain which has since been intermittent in nature. Last night, she began to experience intense left thumb pain and noticed purple spots on her thumb so decided to come to ER. She denies any weakness, but has some numbness and tingling in the left thumb however now improving. denies trauma to the left hand. got her nails done last week and when she noticed the swelling and pain of her wrist she took her tip off. patient denies any fevers, chills, night sweats, rashes, joint pain. (2024 11:30) [de-identified] : 6/3/2024 - Initial consult  7/30/3034 Patient seen in follow up for radial artery thrombosis. Interim, she tested +ve for lyme disease, just completed a course of abx for 2 weeks. She notes fatigue and bodyaches since testing positive for lymes - symptoms are unchanged with the abx tx. Otherwise, completed Lovenox. Denies any S&S of clots.

## 2024-08-06 ENCOUNTER — LABORATORY RESULT (OUTPATIENT)
Age: 34
End: 2024-08-06

## 2024-08-06 ENCOUNTER — APPOINTMENT (OUTPATIENT)
Dept: RHEUMATOLOGY | Facility: CLINIC | Age: 34
End: 2024-08-06

## 2024-08-06 PROBLEM — M25.50 ACUTE JOINT PAIN: Status: ACTIVE | Noted: 2024-08-06

## 2024-08-06 PROCEDURE — 99205 OFFICE O/P NEW HI 60 MIN: CPT

## 2024-08-06 NOTE — ASSESSMENT
[FreeTextEntry1] : QUINTEN RIVERO is a 33 year old female, seen on today for Transient,episodic joint pain without overt swelling  and + CCP and + RF mild positive lyme IgM on 7-9-2024  Joint pain  suspicion of RA given blood work but noted family history  no overt swelling on exam but habitus may affect perception of swelling  check US left wrist and left knee to look for subclinical synovitis  check xrays hands/feet to look for damage from RA  trial of diclofenac (not the 2 days before the US) as needed for joint pain  check labs as below to confirm diagnosis  check g6pd (of note daughter is g6pd deficient)  rtc in 4 weeks   Lyme IgM positive mild in 7-9-2024  -> recheck today   LA positive in 4-2024  -> check ps/pt today and check acl/b2g   -> consider lyme igM could be cross reaction with aps labs   Today's medical care services serve as the continuing focal point for needed health care services that are part of ongoing care related to a patient's one or more serious conditions or a complex condition.   Time spent on the encounter included but is not limited to, preparing to see the patient, obtaining and/or reviewing separately obtained history, performing the evaluation, counseling and educating, independently interpreting results with communication to the patient, order placement, referring and/or communicating with other health professionals as described, and documenting clinical information in the electronic health record.   QUINTEN RIVERO was counseled on the differential, workup, disease course, and treatment/management.   Education was provided to ABDULKADIRCARRIE MAT during this encounter. All questions and concerns were answered and addressed in detail.  ABDULKADIRCARRIE FENGSORAYARADHA verbalized understanding and agreed to the plan.   QUINTEN RIVERO has been instructed to call for an earlier appointment if new symptoms develop in the interim. QUINTEN RIVERO has been instructed to make a follow-up appointment in 4 weeks

## 2024-08-06 NOTE — HISTORY OF PRESENT ILLNESS
[FreeTextEntry1] : QUINTEN RIVERO is a 33 year old female, seen on today for + RF  + CCP  + family history of RA  + tansient episodic joint pain non additive since april 2024  LA positive  in 3-2024 but not in 6-2024 and was positive in setting of radial artery thrombus after a line  mild positive Lyme IgM  2 weeks post partum developed progressive transient episodic joint pain.  has been in the arm, leg.   today she has pain in knees and pain with going down the stairs but not wiht pain with going up the stairs   has stiffness in knees in the morning when she gets up in the morning she has stiffness  pain in the right ankle  she wear crocs because she thinks other shoes are too tight chest pain since having second child and working with pmd - has been evaluated for gerd -  sometimes it is better when she opens her chest and leans back  no pain with taking a deep breath  had a rash on her back in the past that resolved  -had this rash from the 2021 - 3-2024.   completed 2 weeks of cefuroxime for lyme joint pain was not affected by the antibiotics for the lyme   sister dx with RA completed breast feeding   6-2024 developed severe pain in the right arm and went to the ER - had negative duplex and was sent home.  saw pmd after and had blood work (+ RF.+ CCP mild positive lyme IgM)  1st child M c section at 35 weeks after spontaneous rupture of membranes and had emergency c section due ot failure to progress 2nd child M scehduled c section at 38 weeks  3rd child F - placenta previa and suspected placenta accreta -> c section then blood clot in left arm due to a line   has had bilateral tubal  (one tube removed fully and one partially removed) no plans for further pregnancies.

## 2024-08-06 NOTE — PHYSICAL EXAM
[General Appearance - Alert] : alert [General Appearance - In No Acute Distress] : in no acute distress [Sclera] : the sclera and conjunctiva were normal [Outer Ear] : the ears and nose were normal in appearance [FreeTextEntry1] : no swollen joints, ttp in knees with flex [Skin Color & Pigmentation] : normal skin color and pigmentation

## 2024-08-14 ENCOUNTER — APPOINTMENT (OUTPATIENT)
Dept: INTERNAL MEDICINE | Facility: CLINIC | Age: 34
End: 2024-08-14
Payer: MEDICAID

## 2024-08-14 VITALS
WEIGHT: 220 LBS | HEIGHT: 63 IN | TEMPERATURE: 97.9 F | SYSTOLIC BLOOD PRESSURE: 109 MMHG | OXYGEN SATURATION: 98 % | HEART RATE: 86 BPM | DIASTOLIC BLOOD PRESSURE: 71 MMHG | BODY MASS INDEX: 38.98 KG/M2

## 2024-08-14 DIAGNOSIS — M05.9 RHEUMATOID ARTHRITIS WITH RHEUMATOID FACTOR, UNSPECIFIED: ICD-10-CM

## 2024-08-14 DIAGNOSIS — E66.9 OBESITY, UNSPECIFIED: ICD-10-CM

## 2024-08-14 PROCEDURE — 99213 OFFICE O/P EST LOW 20 MIN: CPT

## 2024-08-14 NOTE — HISTORY OF PRESENT ILLNESS
[FreeTextEntry1] : f/u [de-identified] : Hands are better today saw rheum extensive testing most considstent with RA activity lower ihn blood tests also g6pd neg otherwise well

## 2024-08-19 ENCOUNTER — NON-APPOINTMENT (OUTPATIENT)
Age: 34
End: 2024-08-19

## 2024-08-30 ENCOUNTER — APPOINTMENT (OUTPATIENT)
Dept: ULTRASOUND IMAGING | Facility: CLINIC | Age: 34
End: 2024-08-30

## 2024-08-30 ENCOUNTER — LABORATORY RESULT (OUTPATIENT)
Age: 34
End: 2024-08-30

## 2024-08-30 ENCOUNTER — APPOINTMENT (OUTPATIENT)
Dept: RHEUMATOLOGY | Facility: CLINIC | Age: 34
End: 2024-08-30
Payer: MEDICAID

## 2024-08-30 ENCOUNTER — APPOINTMENT (OUTPATIENT)
Dept: ULTRASOUND IMAGING | Facility: CLINIC | Age: 34
End: 2024-08-30
Payer: MEDICAID

## 2024-08-30 ENCOUNTER — APPOINTMENT (OUTPATIENT)
Dept: RADIOLOGY | Facility: CLINIC | Age: 34
End: 2024-08-30
Payer: MEDICAID

## 2024-08-30 ENCOUNTER — OUTPATIENT (OUTPATIENT)
Dept: OUTPATIENT SERVICES | Facility: HOSPITAL | Age: 34
LOS: 1 days | End: 2024-08-30
Payer: MEDICAID

## 2024-08-30 ENCOUNTER — RESULT REVIEW (OUTPATIENT)
Age: 34
End: 2024-08-30

## 2024-08-30 ENCOUNTER — APPOINTMENT (OUTPATIENT)
Dept: RADIOLOGY | Facility: CLINIC | Age: 34
End: 2024-08-30

## 2024-08-30 VITALS
SYSTOLIC BLOOD PRESSURE: 118 MMHG | HEIGHT: 63 IN | OXYGEN SATURATION: 98 % | WEIGHT: 220 LBS | BODY MASS INDEX: 38.98 KG/M2 | RESPIRATION RATE: 16 BRPM | TEMPERATURE: 98.1 F | HEART RATE: 86 BPM | DIASTOLIC BLOOD PRESSURE: 74 MMHG

## 2024-08-30 DIAGNOSIS — Z00.8 ENCOUNTER FOR OTHER GENERAL EXAMINATION: ICD-10-CM

## 2024-08-30 DIAGNOSIS — M25.50 PAIN IN UNSPECIFIED JOINT: ICD-10-CM

## 2024-08-30 DIAGNOSIS — A69.23 ARTHRITIS DUE TO LYME DISEASE: ICD-10-CM

## 2024-08-30 DIAGNOSIS — Z98.891 HISTORY OF UTERINE SCAR FROM PREVIOUS SURGERY: Chronic | ICD-10-CM

## 2024-08-30 DIAGNOSIS — Z98.890 OTHER SPECIFIED POSTPROCEDURAL STATES: Chronic | ICD-10-CM

## 2024-08-30 PROCEDURE — 73130 X-RAY EXAM OF HAND: CPT

## 2024-08-30 PROCEDURE — 73130 X-RAY EXAM OF HAND: CPT | Mod: 26,LT,RT

## 2024-08-30 PROCEDURE — 76881 US COMPL JOINT R-T W/IMG: CPT

## 2024-08-30 PROCEDURE — 73630 X-RAY EXAM OF FOOT: CPT

## 2024-08-30 PROCEDURE — 73630 X-RAY EXAM OF FOOT: CPT | Mod: 26,LT,RT

## 2024-08-30 PROCEDURE — 76881 US COMPL JOINT R-T W/IMG: CPT | Mod: 26,LT

## 2024-08-30 PROCEDURE — 99215 OFFICE O/P EST HI 40 MIN: CPT

## 2024-08-30 PROCEDURE — 76881 US COMPL JOINT R-T W/IMG: CPT | Mod: 26,LT,76

## 2024-08-30 NOTE — HISTORY OF PRESENT ILLNESS
[FreeTextEntry1] : QUINTEN RIVERO is a 33 year old female, seen on today for + RF  + CCP  + family history of RA  + transient episodic joint pain non additive since April 2024  LA positive in 3-2024 but not in 6-2024 and was positive in setting of radial artery thrombus after a line  mild positive Lyme IgM no dvt/PE or MC  Notes that she had pain in the left wrist and hand starting on Tuesday. still present today but improved significantly  had US done today for left wrist and knee   has had bilateral tubal (one tube removed fully and one partially removed) no plans for further pregnancies.

## 2024-08-30 NOTE — PHYSICAL EXAM
[General Appearance - Alert] : alert [General Appearance - In No Acute Distress] : in no acute distress [Sclera] : the sclera and conjunctiva were normal [Outer Ear] : the ears and nose were normal in appearance [Skin Color & Pigmentation] : normal skin color and pigmentation [FreeTextEntry1] : ttp in the left wrist and left knee

## 2024-08-30 NOTE — DATA REVIEWED
[FreeTextEntry1] : Laboratory and radiology studies that were personally reviewed at today's visit are summarized in above and below:  8-6-2024:  Lyme IgG negaitve, Lyme IgM (equivocal)  RF positive CCP positive  CINTHIA 1:80 G6PD 23.8 phosphatidyl serine ab 48   8-2024:  US left wrist - no synovitis  8-2024:  US left knee  - no synovitis or effusion.  D insufficient

## 2024-08-30 NOTE — ASSESSMENT
[FreeTextEntry1] : QUINTEN RIVERO is a 33 year old female, seen on today for Transient,episodic joint pain without overt swelling  and + CCP and + RF mild positive lyme IgM on 7-9-2024  RA RF+ , CCP + , CINTHIA 1:80, Phosphatidyl serine + 8-6-2024) Diagnosis d/w patient today  options for medication d/w patient today (cDMARD/bDMARD/tsDMARD) she chooses to start HCQ ACR handout given to patient today on HCQ  Risks of, benefits of, and alternatives to this treatment plan were discussed with the patient and the patient expressed understanding.  follow up xrays  US wihtout IA changes  diclofeanc as needed (she hasn't tried it yet)   Lyme IgM positive mild in 7-9-2024 and equivocal in 8-6-2024 check lyme again today (likley cross rection with pspt   LA positive in 4-2024 and phosphatidyl serine positive ([id number])  -> recheck phosphadtidyl serine in   -> no dvt/pe or MC history   Today's medical care services serve as the continuing focal point for needed health care services that are part of ongoing care related to a patient's one or more serious conditions or a complex condition.   Time spent on the encounter included but is not limited to, preparing to see the patient, obtaining and/or reviewing separately obtained history, performing the evaluation, counseling and educating, independently interpreting results with communication to the patient, order placement, referring and/or communicating with other health professionals as described, and documenting clinical information in the electronic health record.   QUINTEN RIVERO was counseled on the differential, workup, disease course, and treatment/management.   Education was provided to QUINTEN RIVERO during this encounter. All questions and concerns were answered and addressed in detail.  QUINTEN RIVERO verbalized understanding and agreed to the plan.   QUINTEN RIVERO has been instructed to call for an earlier appointment if new symptoms develop in the interim. QUINTEN RIVERO has been instructed to make a follow-up appointment in 6 weeks

## 2024-09-03 DIAGNOSIS — M05.9 RHEUMATOID ARTHRITIS WITH RHEUMATOID FACTOR, UNSPECIFIED: ICD-10-CM

## 2024-09-03 RX ORDER — HYDROXYCHLOROQUINE SULFATE 200 MG/1
200 TABLET, FILM COATED ORAL
Qty: 180 | Refills: 0 | Status: ACTIVE | COMMUNITY
Start: 2024-09-03 | End: 1900-01-01

## 2025-02-24 NOTE — OB PST NOTE - NSANTHTOTALSCORECAL_ENT_A_CORE
\"Have you been to the ER, urgent care clinic since your last visit?  Hospitalized since your last visit?\"    NO    “Have you seen or consulted any other health care providers outside our system since your last visit?”    NO      “Have you had a diabetic eye exam?”    NO     Date of last diabetic eye exam: 6/12/2020     Administrations This Visit       lidocaine 1 % injection 1 mL       Admin Date  02/24/2025  14:04 Action  Given Dose  1 mL Route  Other Site  Shoulder Right Documented By  Gwendolyn Valle MA    NDC: 85335-852-41    : [employer] [facility]    Patient Supplied?: No              methylPREDNISolone acetate (DEPO-MEDROL) injection 40 mg       Admin Date  02/24/2025  14:03 Action  Given Dose  40 mg Route  IntraMUSCular Site  Shoulder Right Documented By  Gwendolyn Valle MA    NDC: [address]-7    : [employer]    Patient Supplied?: No                 Patient tolerated injection well. Patient advised to wait 20 minutes in the office following the injection. No signs/symptoms of reaction noted after 20 minutes.          1

## 2025-03-12 NOTE — OB NEONATOLOGY/PEDIATRICIAN DELIVERY SUMMARY - BABY A: APGAR 5 MIN COLOR, DELIVERY
Session ID: [id number]  Language: Turkish   ID: #[id number]   Name: Argelia
(1) body pink, extremities blue

## 2025-04-15 ENCOUNTER — LABORATORY RESULT (OUTPATIENT)
Age: 35
End: 2025-04-15

## 2025-04-16 ENCOUNTER — NON-APPOINTMENT (OUTPATIENT)
Age: 35
End: 2025-04-16

## 2025-04-16 ENCOUNTER — LABORATORY RESULT (OUTPATIENT)
Age: 35
End: 2025-04-16

## 2025-04-16 ENCOUNTER — APPOINTMENT (OUTPATIENT)
Dept: OBGYN | Facility: CLINIC | Age: 35
End: 2025-04-16
Payer: MEDICAID

## 2025-04-16 ENCOUNTER — OUTPATIENT (OUTPATIENT)
Dept: OUTPATIENT SERVICES | Facility: HOSPITAL | Age: 35
LOS: 1 days | End: 2025-04-16
Payer: MEDICAID

## 2025-04-16 VITALS — BODY MASS INDEX: 39.33 KG/M2 | SYSTOLIC BLOOD PRESSURE: 110 MMHG | WEIGHT: 222 LBS | DIASTOLIC BLOOD PRESSURE: 72 MMHG

## 2025-04-16 DIAGNOSIS — N64.4 MASTODYNIA: ICD-10-CM

## 2025-04-16 DIAGNOSIS — Z01.419 ENCOUNTER FOR GYNECOLOGICAL EXAMINATION (GENERAL) (ROUTINE) W/OUT ABNORMAL FINDINGS: ICD-10-CM

## 2025-04-16 DIAGNOSIS — Z11.3 ENCOUNTER FOR SCREENING FOR INFECTIONS WITH A PREDOMINANTLY SEXUAL MODE OF TRANSMISSION: ICD-10-CM

## 2025-04-16 DIAGNOSIS — Z01.419 ENCOUNTER FOR GYNECOLOGICAL EXAMINATION (GENERAL) (ROUTINE) WITHOUT ABNORMAL FINDINGS: ICD-10-CM

## 2025-04-16 DIAGNOSIS — O43.109 MALFORMATION OF PLACENTA, UNSPECIFIED, UNSPECIFIED TRIMESTER: ICD-10-CM

## 2025-04-16 DIAGNOSIS — Z34.90 ENCOUNTER FOR SUPERVISION OF NORMAL PREGNANCY, UNSPECIFIED, UNSPECIFIED TRIMESTER: ICD-10-CM

## 2025-04-16 DIAGNOSIS — N76.0 ACUTE VAGINITIS: ICD-10-CM

## 2025-04-16 DIAGNOSIS — Z98.890 OTHER SPECIFIED POSTPROCEDURAL STATES: Chronic | ICD-10-CM

## 2025-04-16 DIAGNOSIS — Z90.49 ACQUIRED ABSENCE OF OTHER SPECIFIED PARTS OF DIGESTIVE TRACT: Chronic | ICD-10-CM

## 2025-04-16 DIAGNOSIS — R39.9 UNSPECIFIED SYMPTOMS AND SIGNS INVOLVING THE GENITOURINARY SYSTEM: ICD-10-CM

## 2025-04-16 DIAGNOSIS — O09.93 SUPERVISION OF HIGH RISK PREGNANCY, UNSPECIFIED, THIRD TRIMESTER: ICD-10-CM

## 2025-04-16 DIAGNOSIS — Z98.891 HISTORY OF UTERINE SCAR FROM PREVIOUS SURGERY: Chronic | ICD-10-CM

## 2025-04-16 LAB
BILIRUB UR QL STRIP: NORMAL
GLUCOSE UR-MCNC: NORMAL
HCG UR QL: 0.2 EU/DL
HGB UR QL STRIP.AUTO: NORMAL
KETONES UR-MCNC: NORMAL
LEUKOCYTE ESTERASE UR QL STRIP: NORMAL
NITRITE UR QL STRIP: NORMAL
PH UR STRIP: 6
PROT UR STRIP-MCNC: NORMAL
SP GR UR STRIP: 1.02

## 2025-04-16 PROCEDURE — 86803 HEPATITIS C AB TEST: CPT

## 2025-04-16 PROCEDURE — 99395 PREV VISIT EST AGE 18-39: CPT | Mod: 25

## 2025-04-16 PROCEDURE — G0463: CPT

## 2025-04-16 PROCEDURE — 87591 N.GONORRHOEAE DNA AMP PROB: CPT

## 2025-04-16 PROCEDURE — G0444 DEPRESSION SCREEN ANNUAL: CPT | Mod: 59

## 2025-04-16 PROCEDURE — 87340 HEPATITIS B SURFACE AG IA: CPT

## 2025-04-16 PROCEDURE — 87086 URINE CULTURE/COLONY COUNT: CPT

## 2025-04-16 PROCEDURE — 87491 CHLMYD TRACH DNA AMP PROBE: CPT

## 2025-04-16 PROCEDURE — 87186 SC STD MICRODIL/AGAR DIL: CPT

## 2025-04-16 PROCEDURE — 81002 URINALYSIS NONAUTO W/O SCOPE: CPT

## 2025-04-16 PROCEDURE — 86780 TREPONEMA PALLIDUM: CPT

## 2025-04-16 PROCEDURE — 87624 HPV HI-RISK TYP POOLED RSLT: CPT

## 2025-04-16 PROCEDURE — 87077 CULTURE AEROBIC IDENTIFY: CPT

## 2025-04-16 PROCEDURE — 87389 HIV-1 AG W/HIV-1&-2 AB AG IA: CPT

## 2025-04-17 ENCOUNTER — LABORATORY RESULT (OUTPATIENT)
Age: 35
End: 2025-04-17

## 2025-04-17 LAB
C TRACH RRNA SPEC QL NAA+PROBE: SIGNIFICANT CHANGE UP
HBV SURFACE AG SER-ACNC: SIGNIFICANT CHANGE UP
HCV AB S/CO SERPL IA: 0.18 S/CO — SIGNIFICANT CHANGE UP (ref 0–0.79)
HCV AB SERPL-IMP: SIGNIFICANT CHANGE UP
HIV 1+2 AB+HIV1 P24 AG SERPL QL IA: SIGNIFICANT CHANGE UP
HPV HIGH+LOW RISK DNA PNL CVX: SIGNIFICANT CHANGE UP
N GONORRHOEA RRNA SPEC QL NAA+PROBE: SIGNIFICANT CHANGE UP
SPECIMEN SOURCE: SIGNIFICANT CHANGE UP
T PALLIDUM AB TITR SER: NEGATIVE — SIGNIFICANT CHANGE UP

## 2025-04-18 RX ORDER — NITROFURANTOIN (MONOHYDRATE/MACROCRYSTALS) 25; 75 MG/1; MG/1
100 CAPSULE ORAL
Qty: 14 | Refills: 0 | Status: ACTIVE | COMMUNITY
Start: 2025-04-18 | End: 1900-01-01

## 2025-04-19 LAB
-  AMPICILLIN: SIGNIFICANT CHANGE UP
-  CIPROFLOXACIN: SIGNIFICANT CHANGE UP
-  LEVOFLOXACIN: SIGNIFICANT CHANGE UP
-  NITROFURANTOIN: SIGNIFICANT CHANGE UP
-  TETRACYCLINE: SIGNIFICANT CHANGE UP
-  VANCOMYCIN: SIGNIFICANT CHANGE UP
CULTURE RESULTS: ABNORMAL
CYTOLOGY SPEC DOC CYTO: SIGNIFICANT CHANGE UP
METHOD TYPE: SIGNIFICANT CHANGE UP
ORGANISM # SPEC MICROSCOPIC CNT: ABNORMAL
ORGANISM # SPEC MICROSCOPIC CNT: ABNORMAL
SPECIMEN SOURCE: SIGNIFICANT CHANGE UP

## 2025-04-21 ENCOUNTER — NON-APPOINTMENT (OUTPATIENT)
Age: 35
End: 2025-04-21

## 2025-04-22 ENCOUNTER — APPOINTMENT (OUTPATIENT)
Dept: ULTRASOUND IMAGING | Facility: IMAGING CENTER | Age: 35
End: 2025-04-22
Payer: MEDICAID

## 2025-04-22 ENCOUNTER — LABORATORY RESULT (OUTPATIENT)
Age: 35
End: 2025-04-22

## 2025-04-22 ENCOUNTER — RESULT REVIEW (OUTPATIENT)
Age: 35
End: 2025-04-22

## 2025-04-22 ENCOUNTER — APPOINTMENT (OUTPATIENT)
Dept: RHEUMATOLOGY | Facility: CLINIC | Age: 35
End: 2025-04-22
Payer: MEDICAID

## 2025-04-22 ENCOUNTER — APPOINTMENT (OUTPATIENT)
Dept: CT IMAGING | Facility: IMAGING CENTER | Age: 35
End: 2025-04-22
Payer: MEDICAID

## 2025-04-22 ENCOUNTER — OUTPATIENT (OUTPATIENT)
Dept: OUTPATIENT SERVICES | Facility: HOSPITAL | Age: 35
LOS: 1 days | End: 2025-04-22
Payer: MEDICAID

## 2025-04-22 VITALS
SYSTOLIC BLOOD PRESSURE: 120 MMHG | BODY MASS INDEX: 41.99 KG/M2 | OXYGEN SATURATION: 99 % | HEART RATE: 76 BPM | HEIGHT: 63 IN | WEIGHT: 237 LBS | DIASTOLIC BLOOD PRESSURE: 83 MMHG

## 2025-04-22 DIAGNOSIS — Z90.49 ACQUIRED ABSENCE OF OTHER SPECIFIED PARTS OF DIGESTIVE TRACT: Chronic | ICD-10-CM

## 2025-04-22 DIAGNOSIS — R06.02 SHORTNESS OF BREATH: ICD-10-CM

## 2025-04-22 DIAGNOSIS — M05.9 RHEUMATOID ARTHRITIS WITH RHEUMATOID FACTOR, UNSPECIFIED: ICD-10-CM

## 2025-04-22 DIAGNOSIS — R07.9 CHEST PAIN, UNSPECIFIED: ICD-10-CM

## 2025-04-22 DIAGNOSIS — Z98.890 OTHER SPECIFIED POSTPROCEDURAL STATES: Chronic | ICD-10-CM

## 2025-04-22 DIAGNOSIS — Z98.891 HISTORY OF UTERINE SCAR FROM PREVIOUS SURGERY: Chronic | ICD-10-CM

## 2025-04-22 LAB
25(OH)D3 SERPL-MCNC: 17 NG/ML
ALBUMIN SERPL ELPH-MCNC: 4.3 G/DL
ALP BLD-CCNC: 80 U/L
ALT SERPL-CCNC: 23 U/L
ANION GAP SERPL CALC-SCNC: 14 MMOL/L
AST SERPL-CCNC: 13 U/L
BASOPHILS # BLD AUTO: 0.02 K/UL
BASOPHILS NFR BLD AUTO: 0.3 %
BILIRUB SERPL-MCNC: 0.3 MG/DL
BUN SERPL-MCNC: 11 MG/DL
CALCIUM SERPL-MCNC: 9.5 MG/DL
CHLORIDE SERPL-SCNC: 104 MMOL/L
CO2 SERPL-SCNC: 25 MMOL/L
CREAT SERPL-MCNC: 0.6 MG/DL
CRP SERPL-MCNC: 8 MG/L
EGFRCR SERPLBLD CKD-EPI 2021: 122 ML/MIN/1.73M2
EOSINOPHIL # BLD AUTO: 0.09 K/UL
EOSINOPHIL NFR BLD AUTO: 1.4 %
GLUCOSE SERPL-MCNC: 118 MG/DL
HCT VFR BLD CALC: 39.6 %
HGB BLD-MCNC: 12.6 G/DL
IMM GRANULOCYTES NFR BLD AUTO: 0.3 %
LUPUS ANTICOAGULANT CASCADE REFLEX: NORMAL
LYMPHOCYTES # BLD AUTO: 1.57 K/UL
LYMPHOCYTES NFR BLD AUTO: 24.4 %
MAN DIFF?: NORMAL
MCHC RBC-ENTMCNC: 26.1 PG
MCHC RBC-ENTMCNC: 31.8 G/DL
MCV RBC AUTO: 82.2 FL
MONOCYTES # BLD AUTO: 0.31 K/UL
MONOCYTES NFR BLD AUTO: 4.8 %
NEUTROPHILS # BLD AUTO: 4.42 K/UL
NEUTROPHILS NFR BLD AUTO: 68.8 %
PLATELET # BLD AUTO: 218 K/UL
POTASSIUM SERPL-SCNC: 4.6 MMOL/L
PROT SERPL-MCNC: 7.6 G/DL
RBC # BLD: 4.82 M/UL
RBC # FLD: 14.5 %
SODIUM SERPL-SCNC: 142 MMOL/L
WBC # FLD AUTO: 6.43 K/UL

## 2025-04-22 PROCEDURE — 71275 CT ANGIOGRAPHY CHEST: CPT | Mod: 26

## 2025-04-22 PROCEDURE — 93971 EXTREMITY STUDY: CPT | Mod: 26,LT

## 2025-04-22 PROCEDURE — 93971 EXTREMITY STUDY: CPT

## 2025-04-22 PROCEDURE — G2211 COMPLEX E/M VISIT ADD ON: CPT | Mod: NC

## 2025-04-22 PROCEDURE — 71275 CT ANGIOGRAPHY CHEST: CPT

## 2025-04-22 PROCEDURE — 99215 OFFICE O/P EST HI 40 MIN: CPT

## 2025-04-22 RX ORDER — HYDROXYCHLOROQUINE SULFATE 200 MG/1
200 TABLET, FILM COATED ORAL
Qty: 60 | Refills: 3 | Status: ACTIVE | COMMUNITY
Start: 2025-04-22 | End: 1900-01-01

## 2025-04-22 RX ORDER — PREDNISONE 5 MG/1
5 TABLET ORAL
Qty: 30 | Refills: 0 | Status: ACTIVE | COMMUNITY
Start: 2025-04-22 | End: 1900-01-01

## 2025-04-23 LAB — ERYTHROCYTE [SEDIMENTATION RATE] IN BLOOD BY WESTERGREN METHOD: 39 MM/HR

## 2025-04-26 PROBLEM — R06.02 SHORTNESS OF BREATH ON EXERTION: Status: ACTIVE | Noted: 2025-04-22

## 2025-04-29 LAB
PS-PROTHROM CMPLX IGG SERPL IA-ACNC: <10 UNITS
PS-PROTHROM CMPLX IGM SERPL IA-ACNC: 52 UNITS

## 2025-04-30 ENCOUNTER — APPOINTMENT (OUTPATIENT)
Dept: CARDIOLOGY | Facility: HOSPITAL | Age: 35
End: 2025-04-30

## 2025-04-30 ENCOUNTER — NON-APPOINTMENT (OUTPATIENT)
Age: 35
End: 2025-04-30

## 2025-04-30 ENCOUNTER — APPOINTMENT (OUTPATIENT)
Dept: CARDIOLOGY | Facility: CLINIC | Age: 35
End: 2025-04-30
Payer: MEDICAID

## 2025-04-30 VITALS
OXYGEN SATURATION: 100 % | WEIGHT: 230 LBS | BODY MASS INDEX: 40.74 KG/M2 | SYSTOLIC BLOOD PRESSURE: 133 MMHG | DIASTOLIC BLOOD PRESSURE: 82 MMHG | RESPIRATION RATE: 16 BRPM | HEART RATE: 85 BPM

## 2025-04-30 DIAGNOSIS — Z79.01 ENCOUNTER FOR THERAPEUTIC DRUG LVL MONITORING: ICD-10-CM

## 2025-04-30 DIAGNOSIS — R06.02 SHORTNESS OF BREATH: ICD-10-CM

## 2025-04-30 DIAGNOSIS — Z51.81 ENCOUNTER FOR THERAPEUTIC DRUG LVL MONITORING: ICD-10-CM

## 2025-04-30 DIAGNOSIS — Z71.89 OTHER SPECIFIED COUNSELING: ICD-10-CM

## 2025-04-30 DIAGNOSIS — Z13.6 ENCOUNTER FOR SCREENING FOR CARDIOVASCULAR DISORDERS: ICD-10-CM

## 2025-04-30 DIAGNOSIS — I74.9 EMBOLISM AND THROMBOSIS OF UNSPECIFIED ARTERY: ICD-10-CM

## 2025-04-30 PROCEDURE — 99214 OFFICE O/P EST MOD 30 MIN: CPT | Mod: 25

## 2025-04-30 PROCEDURE — 93000 ELECTROCARDIOGRAM COMPLETE: CPT

## 2025-05-20 ENCOUNTER — APPOINTMENT (OUTPATIENT)
Dept: RHEUMATOLOGY | Facility: CLINIC | Age: 35
End: 2025-05-20

## 2025-05-30 ENCOUNTER — APPOINTMENT (OUTPATIENT)
Dept: ULTRASOUND IMAGING | Facility: CLINIC | Age: 35
End: 2025-05-30

## 2025-06-17 ENCOUNTER — APPOINTMENT (OUTPATIENT)
Dept: RHEUMATOLOGY | Facility: CLINIC | Age: 35
End: 2025-06-17
Payer: MEDICAID

## 2025-06-17 VITALS
WEIGHT: 235 LBS | DIASTOLIC BLOOD PRESSURE: 83 MMHG | HEIGHT: 63 IN | SYSTOLIC BLOOD PRESSURE: 142 MMHG | HEART RATE: 80 BPM | OXYGEN SATURATION: 99 % | BODY MASS INDEX: 41.64 KG/M2

## 2025-06-17 PROCEDURE — G2211 COMPLEX E/M VISIT ADD ON: CPT | Mod: NC

## 2025-06-17 PROCEDURE — 99214 OFFICE O/P EST MOD 30 MIN: CPT

## 2025-06-18 LAB
25(OH)D3 SERPL-MCNC: 18.7 NG/ML
ALBUMIN SERPL ELPH-MCNC: 4.2 G/DL
ALP BLD-CCNC: 77 U/L
ALT SERPL-CCNC: 33 U/L
ANION GAP SERPL CALC-SCNC: 13 MMOL/L
AST SERPL-CCNC: 15 U/L
BASOPHILS # BLD AUTO: 0.02 K/UL
BASOPHILS NFR BLD AUTO: 0.3 %
BILIRUB SERPL-MCNC: 0.3 MG/DL
BUN SERPL-MCNC: 9 MG/DL
CALCIUM SERPL-MCNC: 9.5 MG/DL
CHLORIDE SERPL-SCNC: 103 MMOL/L
CO2 SERPL-SCNC: 24 MMOL/L
CORTIS SERPL-MCNC: 10.9 UG/DL
CREAT SERPL-MCNC: 0.58 MG/DL
CRP SERPL-MCNC: 7 MG/L
EGFRCR SERPLBLD CKD-EPI 2021: 123 ML/MIN/1.73M2
EOSINOPHIL # BLD AUTO: 0.09 K/UL
EOSINOPHIL NFR BLD AUTO: 1.3 %
ERYTHROCYTE [SEDIMENTATION RATE] IN BLOOD BY WESTERGREN METHOD: 11 MM/HR
ESTIMATED AVERAGE GLUCOSE: 120 MG/DL
GLUCOSE SERPL-MCNC: 88 MG/DL
HBA1C MFR BLD HPLC: 5.8 %
HCT VFR BLD CALC: 38.7 %
HGB BLD-MCNC: 12 G/DL
IMM GRANULOCYTES NFR BLD AUTO: 0.3 %
LYMPHOCYTES # BLD AUTO: 1.67 K/UL
LYMPHOCYTES NFR BLD AUTO: 24.1 %
MAN DIFF?: NORMAL
MCHC RBC-ENTMCNC: 25.8 PG
MCHC RBC-ENTMCNC: 31 G/DL
MCV RBC AUTO: 83.2 FL
MONOCYTES # BLD AUTO: 0.31 K/UL
MONOCYTES NFR BLD AUTO: 4.5 %
NEUTROPHILS # BLD AUTO: 4.82 K/UL
NEUTROPHILS NFR BLD AUTO: 69.5 %
PLATELET # BLD AUTO: 234 K/UL
POTASSIUM SERPL-SCNC: 4.7 MMOL/L
PROT SERPL-MCNC: 7.1 G/DL
RBC # BLD: 4.65 M/UL
RBC # FLD: 14.7 %
SODIUM SERPL-SCNC: 140 MMOL/L
WBC # FLD AUTO: 6.93 K/UL

## 2025-06-18 RX ORDER — ERGOCALCIFEROL 1.25 MG/1
1.25 MG CAPSULE, LIQUID FILLED ORAL
Qty: 13 | Refills: 0 | Status: ACTIVE | COMMUNITY
Start: 2025-06-18 | End: 1900-01-01

## (undated) DEVICE — Device

## (undated) DEVICE — VISITEC 4X4

## (undated) DEVICE — PREP CHLORAPREP HI-LITE ORANGE 26ML

## (undated) DEVICE — WARMING BLANKET FULL ADULT

## (undated) DEVICE — SOL IRR BAG H2O 3000ML

## (undated) DEVICE — ACMI SELF-SEALING SEAL UP TO 7FR

## (undated) DEVICE — GLV 7 PROTEXIS (WHITE)

## (undated) DEVICE — PREP BETADINE SPONGE STICKS

## (undated) DEVICE — GLV 8 PROTEXIS (WHITE)

## (undated) DEVICE — ABDOMINAL BINDER XL 9" X 62"-74"

## (undated) DEVICE — SOL IRR BAG NS 0.9% 3000ML

## (undated) DEVICE — GLV 6.5 PROTEXIS (WHITE)

## (undated) DEVICE — SUT QUILL MONODERM 3-0 30CM PS-2

## (undated) DEVICE — VENODYNE/SCD SLEEVE CALF LARGE

## (undated) DEVICE — TUBING BRAT 2 SUCTION ASSEMBLY TWIST LOCK

## (undated) DEVICE — DRSG OPSITE 13.75 X 4"

## (undated) DEVICE — STAPLER SKIN VISI-STAT 35 WIDE

## (undated) DEVICE — SUT CHROMIC 2-0 36" CT-1

## (undated) DEVICE — POSITIONER FOAM EGG CRATE ULNAR 2PCS (PINK)

## (undated) DEVICE — DRSG STERISTRIPS 0.5 X 4"

## (undated) DEVICE — FOLEY TRAY 16FR LF URINE METER SURESTEP

## (undated) DEVICE — WARMING BLANKET LOWER ADULT

## (undated) DEVICE — PACK CYSTO

## (undated) DEVICE — FOLEY TRAY 16FR 5CC LTX UMETER CLOSED

## (undated) DEVICE — PACK MAJOR ABDOMINAL SUPINE

## (undated) DEVICE — WARMING BLANKET UPPER ADULT

## (undated) DEVICE — SPECIMEN CONTAINER 100ML

## (undated) DEVICE — BLADE SCALPEL SAFETYLOCK #10

## (undated) DEVICE — SOL IRR POUR H2O 250ML

## (undated) DEVICE — GOWN TRIMAX LG

## (undated) DEVICE — DRAPE TOWEL BLUE 17" X 24"

## (undated) DEVICE — MARKING PEN W RULER

## (undated) DEVICE — LAP PAD 18 X 18"

## (undated) DEVICE — SUT POLYSORB 0 30" GS-21 UNDYED

## (undated) DEVICE — ADAPTER URETHRAL CATH CONNECTING

## (undated) DEVICE — SUT PLAIN GUT 2-0 30" GS-21

## (undated) DEVICE — GLV 7.5 PROTEXIS (WHITE)

## (undated) DEVICE — DRAPE MAYO STAND 30"

## (undated) DEVICE — DRAPE LINGEMAN TUR

## (undated) DEVICE — PREP BETADINE KIT

## (undated) DEVICE — BLADE SCALPEL SAFETYLOCK #15

## (undated) DEVICE — GLV 8.5 PROTEXIS (WHITE)

## (undated) DEVICE — SUT POLYSORB 0 36" GS-21

## (undated) DEVICE — DRAPE INSTRUMENT POUCH 6.75" X 11"

## (undated) DEVICE — SUT POLYSORB 1 18" UNDYED

## (undated) DEVICE — VENODYNE/SCD SLEEVE CALF MEDIUM

## (undated) DEVICE — LIGASURE IMPACT

## (undated) DEVICE — SUT POLYSORB 0 18" MP UNDYED

## (undated) DEVICE — POSITIONER FOAM HEADREST (PINK)

## (undated) DEVICE — SUT POLYSORB 1 36" GS-21 UNDYED

## (undated) DEVICE — DRAPE LIGHT HANDLE COVER (BLUE)

## (undated) DEVICE — SOL IRR POUR NS 0.9% 500ML

## (undated) DEVICE — TUBING THERMADX UROLOGY

## (undated) DEVICE — MEDICATION LABELS W MARKER

## (undated) DEVICE — SUT POLYSORB 0 36" GS-25